# Patient Record
Sex: MALE | Race: WHITE | NOT HISPANIC OR LATINO | Employment: FULL TIME | URBAN - METROPOLITAN AREA
[De-identification: names, ages, dates, MRNs, and addresses within clinical notes are randomized per-mention and may not be internally consistent; named-entity substitution may affect disease eponyms.]

---

## 2023-02-24 LAB
ACTIVATED PARTIAL THROMBOPLASTIN TIME IN PPP BY COAGULATION ASSAY: 28 SEC (ref 26–39)
ALBUMIN (G/DL) IN SER/PLAS: 4 G/DL (ref 3.4–5)
ANION GAP IN SER/PLAS: 10 MMOL/L (ref 10–20)
CALCIUM (MG/DL) IN SER/PLAS: 9.3 MG/DL (ref 8.6–10.3)
CARBON DIOXIDE, TOTAL (MMOL/L) IN SER/PLAS: 30 MMOL/L (ref 21–32)
CHLORIDE (MMOL/L) IN SER/PLAS: 98 MMOL/L (ref 98–107)
CHOLESTEROL (MG/DL) IN SER/PLAS: 121 MG/DL (ref 0–199)
CHOLESTEROL IN HDL (MG/DL) IN SER/PLAS: 55.7 MG/DL
CHOLESTEROL/HDL RATIO: 2.2
CREATININE (MG/DL) IN SER/PLAS: 0.79 MG/DL (ref 0.5–1.3)
ERYTHROCYTE DISTRIBUTION WIDTH (RATIO) BY AUTOMATED COUNT: 13.2 % (ref 11.5–14.5)
ERYTHROCYTE MEAN CORPUSCULAR HEMOGLOBIN CONCENTRATION (G/DL) BY AUTOMATED: 32.8 G/DL (ref 32–36)
ERYTHROCYTE MEAN CORPUSCULAR VOLUME (FL) BY AUTOMATED COUNT: 104 FL (ref 80–100)
ERYTHROCYTES (10*6/UL) IN BLOOD BY AUTOMATED COUNT: 3.64 X10E12/L (ref 4.5–5.9)
GFR MALE: >90 ML/MIN/1.73M2
GLUCOSE (MG/DL) IN SER/PLAS: 85 MG/DL (ref 74–99)
HEMATOCRIT (%) IN BLOOD BY AUTOMATED COUNT: 37.8 % (ref 41–52)
HEMOGLOBIN (G/DL) IN BLOOD: 12.4 G/DL (ref 13.5–17.5)
INR IN PPP BY COAGULATION ASSAY: 1 (ref 0.9–1.1)
LDL: 51 MG/DL (ref 0–99)
LEUKOCYTES (10*3/UL) IN BLOOD BY AUTOMATED COUNT: 4.9 X10E9/L (ref 4.4–11.3)
OSMOLALITY, SERUM: 294 MOSM/KG H2O (ref 280–300)
PHOSPHATE (MG/DL) IN SER/PLAS: 4 MG/DL (ref 2.5–4.9)
PLATELETS (10*3/UL) IN BLOOD AUTOMATED COUNT: 305 X10E9/L (ref 150–450)
POTASSIUM (MMOL/L) IN SER/PLAS: 4.5 MMOL/L (ref 3.5–5.3)
PROTHROMBIN TIME (PT) IN PPP BY COAGULATION ASSAY: 11.3 SEC (ref 9.8–13.4)
SODIUM (MMOL/L) IN SER/PLAS: 133 MMOL/L (ref 136–145)
TRIGLYCERIDE (MG/DL) IN SER/PLAS: 74 MG/DL (ref 0–149)
UREA NITROGEN (MG/DL) IN SER/PLAS: 38 MG/DL (ref 6–23)
VLDL: 15 MG/DL (ref 0–40)

## 2023-02-25 LAB
CHLORIDE (MOL/L) IN SPOT URINE: 52 MMOL/L
CHLORIDE/CREATININE (MMOL/G) IN URINE: 74 MMOL/G CREAT (ref 23–275)
CREATININE (MG/DL) IN URINE: 69.9 MG/DL (ref 20–370)
OSMOLALITY, RANDOM URINE: 792 MOSM/KG (ref 200–1200)
POTASSIUM URINE RANDOM: 45 MMOL/L
POTASSIUM/CREATININE (MMOL/G) IN URINE: 64 MMOL/G CREAT
SODIUM URINE RANDOM: 53 MMOL/L
SODIUM/CREATININE (MMOL/G) IN URINE: 76 MMOL/G CREAT
UREA NITROGEN, RANDOM URINE: 1426 MG/DL
UREA NITROGEN/CREATININE (G/G) URINE: 20.4 G/G CREAT

## 2023-05-17 LAB
ALBUMIN (G/DL) IN SER/PLAS: 3.8 G/DL (ref 3.4–5)
ANION GAP IN SER/PLAS: 10 MMOL/L (ref 10–20)
CALCIUM (MG/DL) IN SER/PLAS: 9.3 MG/DL (ref 8.6–10.3)
CARBON DIOXIDE, TOTAL (MMOL/L) IN SER/PLAS: 30 MMOL/L (ref 21–32)
CHLORIDE (MMOL/L) IN SER/PLAS: 93 MMOL/L (ref 98–107)
CREATININE (MG/DL) IN SER/PLAS: 0.77 MG/DL (ref 0.5–1.3)
GFR MALE: >90 ML/MIN/1.73M2
GLUCOSE (MG/DL) IN SER/PLAS: 84 MG/DL (ref 74–99)
OSMOLALITY, SERUM: 275 MOSM/KG H2O (ref 280–300)
PHOSPHATE (MG/DL) IN SER/PLAS: 3.5 MG/DL (ref 2.5–4.9)
POTASSIUM (MMOL/L) IN SER/PLAS: 4.2 MMOL/L (ref 3.5–5.3)
SODIUM (MMOL/L) IN SER/PLAS: 129 MMOL/L (ref 136–145)
UREA NITROGEN (MG/DL) IN SER/PLAS: 15 MG/DL (ref 6–23)

## 2023-05-18 LAB
CHLORIDE (MOL/L) IN SPOT URINE: 64 MMOL/L
CHLORIDE/CREATININE (MMOL/G) IN URINE: 56 MMOL/G CREAT (ref 23–275)
CREATININE (MG/DL) IN URINE: 114 MG/DL (ref 20–370)
OSMOLALITY, RANDOM URINE: 557 MOSM/KG (ref 200–1200)
POTASSIUM URINE RANDOM: 55 MMOL/L
POTASSIUM/CREATININE (MMOL/G) IN URINE: 48 MMOL/G CREAT
SODIUM URINE RANDOM: 68 MMOL/L
SODIUM/CREATININE (MMOL/G) IN URINE: 60 MMOL/G CREAT
UREA NITROGEN, RANDOM URINE: 782 MG/DL
UREA NITROGEN/CREATININE (G/G) URINE: 6.9 G/G CREAT

## 2023-08-25 PROBLEM — R06.83 SNORING: Status: ACTIVE | Noted: 2023-08-25

## 2023-08-25 PROBLEM — J30.9 ALLERGIC RHINITIS: Status: ACTIVE | Noted: 2023-08-25

## 2023-08-25 PROBLEM — M54.16 LUMBAR RADICULITIS: Status: ACTIVE | Noted: 2023-08-25

## 2023-08-25 PROBLEM — R60.9 EDEMA: Status: ACTIVE | Noted: 2023-08-25

## 2023-08-25 PROBLEM — E87.1 CHRONIC HYPONATREMIA: Status: ACTIVE | Noted: 2023-08-25

## 2023-08-25 PROBLEM — M17.11 PRIMARY OSTEOARTHRITIS OF RIGHT KNEE: Status: ACTIVE | Noted: 2023-08-25

## 2023-08-25 PROBLEM — Z79.2 PROPHYLACTIC ANTIBIOTIC: Status: ACTIVE | Noted: 2023-08-25

## 2023-08-25 PROBLEM — M25.561 RIGHT KNEE PAIN: Status: ACTIVE | Noted: 2023-08-25

## 2023-08-25 PROBLEM — M16.9 OA (OSTEOARTHRITIS) OF HIP: Status: ACTIVE | Noted: 2023-08-25

## 2023-08-25 PROBLEM — M19.011 ARTHRITIS OF RIGHT SHOULDER REGION: Status: ACTIVE | Noted: 2023-08-25

## 2023-08-25 PROBLEM — M16.12 PRIMARY OSTEOARTHRITIS OF LEFT HIP: Status: ACTIVE | Noted: 2023-08-25

## 2023-08-25 PROBLEM — G47.33 OBSTRUCTIVE SLEEP APNEA, ADULT: Status: ACTIVE | Noted: 2023-08-25

## 2023-08-25 PROBLEM — M25.511 BILATERAL SHOULDER PAIN: Status: ACTIVE | Noted: 2023-08-25

## 2023-08-25 PROBLEM — R20.0 NUMBNESS: Status: ACTIVE | Noted: 2023-08-25

## 2023-08-25 PROBLEM — N52.9 IMPOTENCE OF ORGANIC ORIGIN: Status: ACTIVE | Noted: 2023-08-25

## 2023-08-25 PROBLEM — H90.3 BILATERAL SENSORINEURAL HEARING LOSS: Status: ACTIVE | Noted: 2023-08-25

## 2023-08-25 PROBLEM — M25.512 BILATERAL SHOULDER PAIN: Status: ACTIVE | Noted: 2023-08-25

## 2023-08-25 PROBLEM — M51.26 LUMBAR DISC HERNIATION: Status: ACTIVE | Noted: 2023-08-25

## 2023-08-25 PROBLEM — M54.50 LUMBAR BACK PAIN: Status: ACTIVE | Noted: 2023-08-25

## 2023-08-25 PROBLEM — M51.26 HERNIATED INTERVERTEBRAL DISC OF LUMBAR SPINE: Status: ACTIVE | Noted: 2023-08-25

## 2023-08-25 PROBLEM — N40.0 BPH (BENIGN PROSTATIC HYPERPLASIA): Status: ACTIVE | Noted: 2023-08-25

## 2023-08-25 PROBLEM — M79.601 PAIN OF RIGHT UPPER EXTREMITY: Status: ACTIVE | Noted: 2023-08-25

## 2023-08-25 PROBLEM — I25.10 CAD (CORONARY ARTERY DISEASE): Status: ACTIVE | Noted: 2023-08-25

## 2023-08-25 PROBLEM — M75.41 IMPINGEMENT SYNDROME OF RIGHT SHOULDER: Status: ACTIVE | Noted: 2023-08-25

## 2023-08-25 PROBLEM — I10 HYPERTENSION: Status: ACTIVE | Noted: 2023-08-25

## 2023-08-25 PROBLEM — M25.552 ARTHRALGIA OF LEFT HIP: Status: ACTIVE | Noted: 2023-08-25

## 2023-08-25 PROBLEM — R07.89 CHEST PAIN, ATYPICAL: Status: ACTIVE | Noted: 2023-08-25

## 2023-08-25 PROBLEM — J33.8 OTHER POLYP OF SINUS: Status: ACTIVE | Noted: 2023-08-25

## 2023-08-25 PROBLEM — Z95.1 S/P CABG X 2: Status: ACTIVE | Noted: 2023-08-25

## 2023-08-25 PROBLEM — M51.16 DISPLACEMENT OF LUMBAR DISC WITH RADICULOPATHY: Status: ACTIVE | Noted: 2023-08-25

## 2023-08-25 PROBLEM — G56.01 CARPAL TUNNEL SYNDROME, RIGHT: Status: ACTIVE | Noted: 2023-08-25

## 2023-08-25 PROBLEM — I25.10 ARTERIOSCLEROSIS OF CORONARY ARTERY: Status: ACTIVE | Noted: 2023-08-25

## 2023-08-25 PROBLEM — R42 DIZZINESS AND GIDDINESS: Status: ACTIVE | Noted: 2023-08-25

## 2023-08-25 PROBLEM — R51.9 HEADACHE: Status: ACTIVE | Noted: 2023-08-25

## 2023-08-25 PROBLEM — R03.0 ELEVATED BLOOD PRESSURE READING WITHOUT DIAGNOSIS OF HYPERTENSION: Status: ACTIVE | Noted: 2023-08-25

## 2023-08-25 PROBLEM — E78.5 HYPERLIPIDEMIA: Status: ACTIVE | Noted: 2023-08-25

## 2023-08-25 RX ORDER — FLUTICASONE PROPIONATE 93 UG/1
1 SPRAY, METERED NASAL 2 TIMES DAILY PRN
COMMUNITY

## 2023-08-25 RX ORDER — OXYCODONE AND ACETAMINOPHEN 5; 325 MG/1; MG/1
1 TABLET ORAL 3 TIMES DAILY PRN
COMMUNITY
Start: 2022-09-26 | End: 2023-10-31 | Stop reason: ALTCHOICE

## 2023-08-25 RX ORDER — TAMSULOSIN HYDROCHLORIDE 0.4 MG/1
1 CAPSULE ORAL DAILY
COMMUNITY
End: 2023-10-31 | Stop reason: ALTCHOICE

## 2023-08-25 RX ORDER — AMLODIPINE BESYLATE 10 MG/1
1 TABLET ORAL EVERY MORNING
COMMUNITY
End: 2023-10-31 | Stop reason: ALTCHOICE

## 2023-08-25 RX ORDER — CARVEDILOL 12.5 MG/1
1 TABLET ORAL
COMMUNITY
Start: 2022-07-20 | End: 2023-10-31 | Stop reason: ALTCHOICE

## 2023-08-25 RX ORDER — METOPROLOL SUCCINATE 100 MG/1
100 TABLET, EXTENDED RELEASE ORAL EVERY MORNING
COMMUNITY
End: 2024-01-12 | Stop reason: SDUPTHER

## 2023-08-25 RX ORDER — METOPROLOL SUCCINATE 25 MG/1
1 TABLET, EXTENDED RELEASE ORAL DAILY
COMMUNITY
Start: 2013-11-01 | End: 2023-10-31 | Stop reason: ALTCHOICE

## 2023-08-25 RX ORDER — ASPIRIN 81 MG/1
81 TABLET ORAL DAILY
COMMUNITY

## 2023-08-25 RX ORDER — ATORVASTATIN CALCIUM 80 MG/1
1 TABLET, FILM COATED ORAL DAILY
COMMUNITY
Start: 2022-07-20 | End: 2023-11-20

## 2023-08-25 RX ORDER — NALOXONE HYDROCHLORIDE 4 MG/.1ML
4 SPRAY NASAL AS NEEDED
COMMUNITY
End: 2023-10-31 | Stop reason: ALTCHOICE

## 2023-08-25 RX ORDER — AMLODIPINE BESYLATE 5 MG/1
1 TABLET ORAL DAILY
COMMUNITY
End: 2023-10-31 | Stop reason: ALTCHOICE

## 2023-08-25 RX ORDER — TRIAMCINOLONE ACETONIDE 1 MG/G
CREAM TOPICAL 2 TIMES DAILY PRN
COMMUNITY
End: 2023-10-31 | Stop reason: ALTCHOICE

## 2023-08-25 RX ORDER — ACETAMINOPHEN 500 MG
2 TABLET ORAL EVERY 6 HOURS PRN
COMMUNITY
End: 2023-11-13 | Stop reason: HOSPADM

## 2023-08-25 RX ORDER — SILDENAFIL 100 MG/1
1 TABLET, FILM COATED ORAL DAILY
COMMUNITY
End: 2023-10-31 | Stop reason: ALTCHOICE

## 2023-08-25 RX ORDER — CLOPIDOGREL BISULFATE 75 MG/1
1 TABLET ORAL DAILY
COMMUNITY
End: 2023-10-31 | Stop reason: ALTCHOICE

## 2023-08-25 RX ORDER — LISINOPRIL 5 MG/1
1 TABLET ORAL DAILY
COMMUNITY
End: 2023-10-31 | Stop reason: ALTCHOICE

## 2023-08-25 RX ORDER — ASPIRIN 81 MG/1
1 TABLET ORAL DAILY
COMMUNITY
End: 2023-10-31 | Stop reason: ALTCHOICE

## 2023-08-25 RX ORDER — SODIUM CHLORIDE 1000 MG
1 TABLET, SOLUBLE MISCELLANEOUS 2 TIMES DAILY
COMMUNITY
End: 2024-01-22

## 2023-08-25 RX ORDER — MELOXICAM 15 MG/1
1 TABLET ORAL DAILY PRN
COMMUNITY
Start: 2022-08-01 | End: 2023-10-31 | Stop reason: ALTCHOICE

## 2023-09-07 LAB
ALBUMIN (G/DL) IN SER/PLAS: 4.1 G/DL (ref 3.4–5)
ANION GAP IN SER/PLAS: 12 MMOL/L (ref 10–20)
CALCIUM (MG/DL) IN SER/PLAS: 9.5 MG/DL (ref 8.6–10.3)
CARBON DIOXIDE, TOTAL (MMOL/L) IN SER/PLAS: 30 MMOL/L (ref 21–32)
CHLORIDE (MMOL/L) IN SER/PLAS: 95 MMOL/L (ref 98–107)
CHLORIDE (MOL/L) IN SPOT URINE: 71 MMOL/L
CHLORIDE/CREATININE (MMOL/G) IN URINE: 110 MMOL/G CREAT (ref 23–275)
CREATININE (MG/DL) IN SER/PLAS: 0.74 MG/DL (ref 0.5–1.3)
CREATININE (MG/DL) IN URINE: 64.3 MG/DL (ref 20–370)
GFR MALE: >90 ML/MIN/1.73M2
GLUCOSE (MG/DL) IN SER/PLAS: 78 MG/DL (ref 74–99)
OSMOLALITY, RANDOM URINE: 417 MOSM/KG (ref 200–1200)
OSMOLALITY, SERUM: 276 MOSM/KG H2O (ref 280–300)
PHOSPHATE (MG/DL) IN SER/PLAS: 3.2 MG/DL (ref 2.5–4.9)
POTASSIUM (MMOL/L) IN SER/PLAS: 4.4 MMOL/L (ref 3.5–5.3)
POTASSIUM URINE RANDOM: 55 MMOL/L
POTASSIUM/CREATININE (MMOL/G) IN URINE: 86 MMOL/G CREAT
PROTEIN TOTAL: 6.7 G/DL (ref 6.4–8.2)
SODIUM (MMOL/L) IN SER/PLAS: 133 MMOL/L (ref 136–145)
SODIUM URINE RANDOM: 57 MMOL/L
SODIUM/CREATININE (MMOL/G) IN URINE: 89 MMOL/G CREAT
URATE (MG/DL) IN SER/PLAS: 5 MG/DL (ref 4–7.5)
UREA NITROGEN (MG/DL) IN SER/PLAS: 13 MG/DL (ref 6–23)
UREA NITROGEN, RANDOM URINE: 526 MG/DL
UREA NITROGEN/CREATININE (G/G) URINE: 8.2 G/G CREAT

## 2023-10-12 ENCOUNTER — OFFICE VISIT (OUTPATIENT)
Dept: PRIMARY CARE | Facility: CLINIC | Age: 73
End: 2023-10-12
Payer: COMMERCIAL

## 2023-10-12 VITALS
BODY MASS INDEX: 30.43 KG/M2 | SYSTOLIC BLOOD PRESSURE: 156 MMHG | HEIGHT: 68 IN | DIASTOLIC BLOOD PRESSURE: 71 MMHG | HEART RATE: 65 BPM | OXYGEN SATURATION: 98 % | TEMPERATURE: 97.3 F | WEIGHT: 200.8 LBS

## 2023-10-12 DIAGNOSIS — E87.1 HYPONATREMIA: ICD-10-CM

## 2023-10-12 DIAGNOSIS — R53.82 CHRONIC FATIGUE: Primary | ICD-10-CM

## 2023-10-12 PROBLEM — G89.29 CHRONIC RIGHT SHOULDER PAIN: Status: ACTIVE | Noted: 2021-02-25

## 2023-10-12 PROBLEM — M54.50 ACUTE LEFT-SIDED LOW BACK PAIN WITHOUT SCIATICA: Status: ACTIVE | Noted: 2020-02-17

## 2023-10-12 PROBLEM — R33.9 RETENTION OF URINE, UNSPECIFIED: Status: ACTIVE | Noted: 2022-08-11

## 2023-10-12 PROBLEM — Z96.642 PRESENCE OF LEFT ARTIFICIAL HIP JOINT: Status: ACTIVE | Noted: 2022-08-11

## 2023-10-12 PROBLEM — Z87.891 PERSONAL HISTORY OF NICOTINE DEPENDENCE: Status: ACTIVE | Noted: 2022-08-11

## 2023-10-12 PROBLEM — M25.511 CHRONIC RIGHT SHOULDER PAIN: Status: ACTIVE | Noted: 2021-02-25

## 2023-10-12 PROBLEM — D64.9 ANEMIA: Status: ACTIVE | Noted: 2022-08-11

## 2023-10-12 PROBLEM — M19.011 ARTHRITIS OF RIGHT ACROMIOCLAVICULAR JOINT: Status: ACTIVE | Noted: 2021-02-25

## 2023-10-12 PROBLEM — I25.10 ATHSCL HEART DISEASE OF NATIVE CORONARY ARTERY W/O ANG PCTRS: Status: ACTIVE | Noted: 2022-08-11

## 2023-10-12 PROBLEM — S83.241A ACUTE MEDIAL MENISCUS TEAR OF RIGHT KNEE: Status: ACTIVE | Noted: 2019-07-11

## 2023-10-12 PROBLEM — Z96.651 PRESENCE OF RIGHT ARTIFICIAL KNEE JOINT: Status: ACTIVE | Noted: 2022-08-11

## 2023-10-12 PROCEDURE — 3077F SYST BP >= 140 MM HG: CPT | Performed by: FAMILY MEDICINE

## 2023-10-12 PROCEDURE — 1036F TOBACCO NON-USER: CPT | Performed by: FAMILY MEDICINE

## 2023-10-12 PROCEDURE — 99214 OFFICE O/P EST MOD 30 MIN: CPT | Performed by: FAMILY MEDICINE

## 2023-10-12 PROCEDURE — 3078F DIAST BP <80 MM HG: CPT | Performed by: FAMILY MEDICINE

## 2023-10-12 PROCEDURE — 1159F MED LIST DOCD IN RCRD: CPT | Performed by: FAMILY MEDICINE

## 2023-10-12 PROCEDURE — 1126F AMNT PAIN NOTED NONE PRSNT: CPT | Performed by: FAMILY MEDICINE

## 2023-10-12 RX ORDER — AZELASTINE 1 MG/ML
SPRAY, METERED NASAL
COMMUNITY
Start: 2019-10-16 | End: 2023-10-31 | Stop reason: ALTCHOICE

## 2023-10-12 RX ORDER — UREA 15 G
POWDER IN PACKET (EA) ORAL DAILY
COMMUNITY
Start: 2023-09-25 | End: 2024-02-06

## 2023-10-12 RX ORDER — ACETAMINOPHEN 500 MG
TABLET ORAL
COMMUNITY
Start: 2022-08-23 | End: 2023-10-31 | Stop reason: ALTCHOICE

## 2023-10-12 RX ORDER — NAPROXEN SODIUM 220 MG/1
TABLET, FILM COATED ORAL
COMMUNITY
Start: 2022-08-23 | End: 2023-10-31 | Stop reason: ALTCHOICE

## 2023-10-12 RX ORDER — ASPIRIN 81 MG/1
81 TABLET ORAL 2 TIMES DAILY
COMMUNITY
Start: 2019-08-07 | End: 2023-10-31 | Stop reason: ALTCHOICE

## 2023-10-12 ASSESSMENT — ENCOUNTER SYMPTOMS
SINUS PAIN: 0
APPETITE CHANGE: 0
SLEEP DISTURBANCE: 0
LIGHT-HEADEDNESS: 1
FEVER: 0
RHINORRHEA: 0
FATIGUE: 1
ACTIVITY CHANGE: 0
SINUS PRESSURE: 0
POLYDIPSIA: 0
WEAKNESS: 0
SHORTNESS OF BREATH: 0
PALPITATIONS: 0
DECREASED CONCENTRATION: 1
SORE THROAT: 0
UNEXPECTED WEIGHT CHANGE: 0
PHOTOPHOBIA: 0

## 2023-10-12 NOTE — PROGRESS NOTES
I reviewed and examined the patient. I was present for the key exam elements, and I fully participated in the patient's care. I discussed the management of the care with the resident. I have personally reviewed the pertinent labs and imaging, as well as recent notes, with the patient. I have reviewed the note above and agree with the resident's medical decision making as documented in the resident's note, in addition to the following comments / findings:     Agree with the rest of the plan outlined below by resident physician. No red flags.      The patient understands and agrees to the assessment and plan of care. Patient has also agreed to follow up and comply with the treatment and evaluation as recommended today. Patient was instructed to call the office at [phone] should questions arise regarding their treatment or care.     Omer Gar DO, FAOASM  Family Medicine   [facility]  [address] [place] [address], [address]  [place], [place] [address]

## 2023-10-12 NOTE — PROGRESS NOTES
"Subjective   Patient ID: Luis Manuel Avitia is a 72 y.o. male who presents for brain fog the past 4-6 weeks. Previously seen by Dr. Gar for fatigue workup 2.5 years ago. Na and Cl low on previous CMP, referred to nephrologist. Takes x3 sodium chloride pill daily, URE-NA x2 daily, and limits to 2-3 glasses water per day. Fatigue better controlled when began this regimen, now coming in complaining of same symptoms. Denies forgetfulness. Wakes up feeling refreshed, but tires within 3-4 hours of waking. Additional sleeping doesn't help. Typically obtaining 7-8 hours a night. Sleeps with CPAP. No recent URIs, denies sick contacts.     Medical History:  HTN  DLD  JOHNATHAN    Surgical History:  Shoulder surgery 6 months ago  Aortocoronary bypass graft    Family History:  Father- lung cancer     Social History:  Smoking- former, quit at age 29  Alcohol- x1 per day  Occupation-      Preventative Medicine:  Vaccinations- none this year    Review of Systems   Constitutional:  Positive for fatigue. Negative for activity change, appetite change, fever and unexpected weight change.   HENT:  Negative for congestion, rhinorrhea, sinus pressure, sinus pain and sore throat.    Eyes:  Negative for photophobia and visual disturbance.   Respiratory:  Negative for shortness of breath.    Cardiovascular:  Negative for chest pain, palpitations and leg swelling.   Endocrine: Negative for polydipsia and polyuria.   Neurological:  Positive for light-headedness. Negative for syncope and weakness.   Psychiatric/Behavioral:  Positive for decreased concentration. Negative for sleep disturbance.    All other systems reviewed and are negative.    Objective   Vitals:   /71   Pulse 65   Temp 36.3 °C (97.3 °F)   Ht 1.727 m (5' 8\")   Wt 91.1 kg (200 lb 12.8 oz)   SpO2 98%   BMI 30.53 kg/m²    /70 in room  Physical Exam  Constitutional:       General: He is not in acute distress.     Appearance: Normal appearance. He is not " "toxic-appearing.   HENT:      Mouth/Throat:      Mouth: Mucous membranes are moist.      Pharynx: Oropharynx is clear.   Neck:      Vascular: No carotid bruit.      Comments: No thyromegaly.   Cardiovascular:      Rate and Rhythm: Normal rate and regular rhythm.   Pulmonary:      Effort: Pulmonary effort is normal. No respiratory distress.      Breath sounds: Normal breath sounds.   Musculoskeletal:         General: No signs of injury.      Right lower leg: No edema.      Left lower leg: No edema.   Lymphadenopathy:      Cervical: No cervical adenopathy.   Neurological:      Mental Status: He is alert.   Psychiatric:         Mood and Affect: Mood normal.         Behavior: Behavior normal.       Lab Results   Component Value Date    WBC 9.0 03/07/2023    HGB 11.7 (L) 03/07/2023    HCT 34.1 (L) 03/07/2023     (H) 03/07/2023     03/07/2023      Lab Results   Component Value Date    GLUCOSE 78 09/07/2023    CALCIUM 9.5 09/07/2023     (L) 09/07/2023    K 4.4 09/07/2023    CO2 30 09/07/2023    CL 95 (L) 09/07/2023    BUN 13 09/07/2023    CREATININE 0.74 09/07/2023     Lab Results   Component Value Date    CHOL 121 02/24/2023    CHOL 121 08/12/2022    CHOL 231 (H) 07/14/2022     Lab Results   Component Value Date    HDL 55.7 02/24/2023    HDL 55.6 08/12/2022    HDL 70.5 07/14/2022     No results found for: \"LDLCALC\"  Lab Results   Component Value Date    TRIG 74 02/24/2023    TRIG 70 08/12/2022    TRIG 104 07/14/2022     No components found for: \"CHOLHDL\"     Assessment:  72 y.o. male with a history of anemia, hyponatremia and hypochloremia presenting for brain fog. Pt has followed up with nephrologist in past 2 months, but still experiencing fatigue.     Chronic fatigue   History of aortocoronary bypass graft    Plan:  Obtain labs- CBC, CMP, TSH, Iron and TIBC, PSA  US of jameel Crawford, Student [employer]  "

## 2023-10-13 ENCOUNTER — LAB (OUTPATIENT)
Dept: LAB | Facility: LAB | Age: 73
End: 2023-10-13
Payer: COMMERCIAL

## 2023-10-13 DIAGNOSIS — R53.82 CHRONIC FATIGUE: ICD-10-CM

## 2023-10-13 LAB
ALBUMIN SERPL BCP-MCNC: 4.1 G/DL (ref 3.4–5)
ALP SERPL-CCNC: 64 U/L (ref 33–136)
ALT SERPL W P-5'-P-CCNC: 22 U/L (ref 10–52)
ANION GAP SERPL CALC-SCNC: 14 MMOL/L (ref 10–20)
AST SERPL W P-5'-P-CCNC: 19 U/L (ref 9–39)
BILIRUB SERPL-MCNC: 0.7 MG/DL (ref 0–1.2)
BUN SERPL-MCNC: 34 MG/DL (ref 6–23)
CALCIUM SERPL-MCNC: 9 MG/DL (ref 8.6–10.3)
CHLORIDE SERPL-SCNC: 97 MMOL/L (ref 98–107)
CO2 SERPL-SCNC: 27 MMOL/L (ref 21–32)
CREAT SERPL-MCNC: 0.76 MG/DL (ref 0.5–1.3)
ERYTHROCYTE [DISTWIDTH] IN BLOOD BY AUTOMATED COUNT: 12.6 % (ref 11.5–14.5)
GFR SERPL CREATININE-BSD FRML MDRD: >90 ML/MIN/1.73M*2
GLUCOSE SERPL-MCNC: 156 MG/DL (ref 74–99)
HCT VFR BLD AUTO: 42.7 % (ref 41–52)
HGB BLD-MCNC: 14 G/DL (ref 13.5–17.5)
IRON SATN MFR SERPL: 25 % (ref 25–45)
IRON SERPL-MCNC: 81 UG/DL (ref 35–150)
MCH RBC QN AUTO: 34.2 PG (ref 26–34)
MCHC RBC AUTO-ENTMCNC: 32.8 G/DL (ref 32–36)
MCV RBC AUTO: 104 FL (ref 80–100)
NRBC BLD-RTO: 0 /100 WBCS (ref 0–0)
PLATELET # BLD AUTO: 263 X10*3/UL (ref 150–450)
PMV BLD AUTO: 9.1 FL (ref 7.5–11.5)
POTASSIUM SERPL-SCNC: 4.1 MMOL/L (ref 3.5–5.3)
PROT SERPL-MCNC: 6.8 G/DL (ref 6.4–8.2)
RBC # BLD AUTO: 4.09 X10*6/UL (ref 4.5–5.9)
SODIUM SERPL-SCNC: 134 MMOL/L (ref 136–145)
TIBC SERPL-MCNC: 324 UG/DL (ref 240–445)
TSH SERPL-ACNC: 0.53 MIU/L (ref 0.44–3.98)
UIBC SERPL-MCNC: 243 UG/DL (ref 110–370)
WBC # BLD AUTO: 6.5 X10*3/UL (ref 4.4–11.3)

## 2023-10-13 PROCEDURE — 83540 ASSAY OF IRON: CPT

## 2023-10-13 PROCEDURE — 84443 ASSAY THYROID STIM HORMONE: CPT

## 2023-10-13 PROCEDURE — 84153 ASSAY OF PSA TOTAL: CPT

## 2023-10-13 PROCEDURE — 83550 IRON BINDING TEST: CPT

## 2023-10-13 PROCEDURE — 80053 COMPREHEN METABOLIC PANEL: CPT

## 2023-10-13 PROCEDURE — 85027 COMPLETE CBC AUTOMATED: CPT

## 2023-10-13 PROCEDURE — 36415 COLL VENOUS BLD VENIPUNCTURE: CPT

## 2023-10-14 LAB — PSA SERPL-MCNC: 0.88 NG/ML

## 2023-10-16 ENCOUNTER — APPOINTMENT (OUTPATIENT)
Dept: RADIOLOGY | Facility: HOSPITAL | Age: 73
End: 2023-10-16
Payer: COMMERCIAL

## 2023-10-16 ENCOUNTER — HOSPITAL ENCOUNTER (OUTPATIENT)
Dept: VASCULAR MEDICINE | Facility: CLINIC | Age: 73
Discharge: HOME | End: 2023-10-16
Payer: COMMERCIAL

## 2023-10-16 DIAGNOSIS — R42 DIZZINESS AND GIDDINESS: ICD-10-CM

## 2023-10-16 DIAGNOSIS — R53.82 CHRONIC FATIGUE: ICD-10-CM

## 2023-10-16 PROCEDURE — 93880 EXTRACRANIAL BILAT STUDY: CPT | Performed by: INTERNAL MEDICINE

## 2023-10-16 PROCEDURE — 93880 EXTRACRANIAL BILAT STUDY: CPT

## 2023-10-18 ENCOUNTER — TELEPHONE (OUTPATIENT)
Dept: PRIMARY CARE | Facility: CLINIC | Age: 73
End: 2023-10-18
Payer: COMMERCIAL

## 2023-10-18 DIAGNOSIS — E78.5 HYPERLIPIDEMIA, UNSPECIFIED HYPERLIPIDEMIA TYPE: Primary | ICD-10-CM

## 2023-10-18 DIAGNOSIS — R73.9 HYPERGLYCEMIA: ICD-10-CM

## 2023-10-18 NOTE — TELEPHONE ENCOUNTER
Patient called asking for the results from the ultrasound that he had done on the 16th.  It looks like it is done but has not been signed off on yet though.

## 2023-10-24 NOTE — PROGRESS NOTES
CHIEF COMPLAINT:   Chief Complaint   Patient presents with    Right Shoulder - Post-op    Left Shoulder - Pain     History: 72 y.o. male presents to the office today for follow-up present time.  He is his left shoulder.  He has known glenohumeral arthritis on the left side.  We performed a right reverse shoulder replacement on him about 8 months ago and he is doing well from that perspective.  He is still working on strengthening.  In terms of left side, he has significant pain that is anterior and deep in the left shoulder.  He difficulty with overhead activities.  He has had cortisone injections which provided only minimal relief.  He has done therapy alongside his right shoulder, denies says that the pain is only gotten worse.  Is at the point where the left shoulder significantly impacting his ability and to enjoy life.    Past medical history, past surgical history, medications, allergies, family history, social history, and review of systems were reviewed today.    A 12 point review of systems was negative other than as stated in the HPI.    Past Medical History:   Diagnosis Date    Encounter for other preprocedural examination 12/08/2020    Preoperative clearance    Other intervertebral disc displacement, lumbar region 10/30/2020    Lumbar disc herniation    Personal history of other diseases of the circulatory system     History of hypertension    Personal history of other diseases of the nervous system and sense organs     History of obstructive sleep apnea        No Known Allergies     Past Surgical History:   Procedure Laterality Date    KNEE SURGERY  05/11/2016    Knee Surgery    OTHER SURGICAL HISTORY  09/23/2022    Coronary artery bypass graft    OTHER SURGICAL HISTORY  12/17/2021    Hip surgery        Family History   Problem Relation Name Age of Onset    Lung cancer Father          Social History     Socioeconomic History    Marital status:      Spouse name: Not on file    Number of children:  Not on file    Years of education: Not on file    Highest education level: Not on file   Occupational History    Not on file   Tobacco Use    Smoking status: Former     Types: Cigarettes     Quit date: 1980     Years since quittin.8    Smokeless tobacco: Never   Vaping Use    Vaping Use: Never used   Substance and Sexual Activity    Alcohol use: Yes     Alcohol/week: 6.0 standard drinks of alcohol     Types: 2 Glasses of wine, 2 Cans of beer, 2 Shots of liquor per week    Drug use: Never    Sexual activity: Not on file   Other Topics Concern    Not on file   Social History Narrative    Not on file     Social Determinants of Health     Financial Resource Strain: Not on file   Food Insecurity: Not on file   Transportation Needs: Not on file   Physical Activity: Not on file   Stress: Not on file   Social Connections: Not on file   Intimate Partner Violence: Not on file   Housing Stability: Not on file        CURRENT MEDICATIONS:   Current Outpatient Medications   Medication Sig Dispense Refill    acetaminophen (Tylenol) 500 mg tablet Take 2 tablets (1,000 mg) by mouth every 6 hours if needed.      acetaminophen (Tylenol) 500 mg tablet 2 tablet EVERY 6 HOURS (route: oral)      amLODIPine (Norvasc) 10 mg tablet Take 1 tablet (10 mg) by mouth once daily in the morning.      amLODIPine (Norvasc) 5 mg tablet Take 1 tablet (5 mg) by mouth once daily.      aspirin (Adult Low Dose Aspirin) 81 mg EC tablet Take 1 tablet (81 mg) by mouth once daily. As directed      aspirin (Aspirin Childrens) 81 mg chewable tablet 1 tablet DAILY (route: oral)      aspirin 81 mg EC tablet Take 1 tablet (81 mg) by mouth once daily.      aspirin 81 mg EC tablet Take 1 tablet (81 mg) by mouth twice a day.      atorvastatin (Lipitor) 80 mg tablet Take 1 tablet (80 mg) by mouth once daily.      azelastine (Astelin) 137 mcg (0.1 %) nasal spray Instill 2 (TWO) sprays in each nostril TWICE DAILY      carvedilol (Coreg) 12.5 mg tablet Take 1  tablet (12.5 mg) by mouth 2 times a day with meals.      clopidogrel (Plavix) 75 mg tablet Take 1 tablet (75 mg) by mouth once daily.      diclofenac sodium 1 % kit APPLY TO UPPER EXTREMITIES 2 GM OF GEL TO AFFECTED AREA 4 TIMES DAILY. DO NOT APPY MORE THAN 8 GM DAILY TO ANY ONE AFFECTED JOINT.      ferrous sulfate 143 mg (45 mg iron) ER tablet Take 1 tablet (143 mg) by mouth once daily.      fluticasone propionate (Xhance) 93 mcg/actuation aerosol breath activated Administer 1 puff into affected nostril(s) 2 times a day as needed.      fluticasone propionate 93 mcg/actuation aerosol breath activated Administer 1 spray into affected nostril(s) 2 times a day as needed.      lisinopril 5 mg tablet Take 1 tablet (5 mg) by mouth once daily.      meloxicam (Mobic) 15 mg tablet Take 1 tablet (15 mg) by mouth once daily as needed.      metoprolol succinate XL (Toprol XL) 25 mg 24 hr tablet Take 1 tablet (25 mg) by mouth once daily. For 30 days      metoprolol succinate XL (Toprol-XL) 100 mg 24 hr tablet Take 1 tablet (100 mg) by mouth once daily in the morning.      multivitamin (MULTIPLE VITAMINS ORAL) Take 1 tablet by mouth once daily.      naloxone (Narcan) 4 mg/0.1 mL nasal spray Administer 1 spray (4 mg) into affected nostril(s) if needed for opioid reversal. May repeat every 2-3 minutes if needed, alternating nostrils, until medical assistance becomes available.      oxyCODONE-acetaminophen (Percocet) 5-325 mg tablet Take 1 tablet by mouth 3 times a day as needed.      sildenafil (Viagra) 100 mg tablet Take 1 tablet (100 mg) by mouth once daily. For 30 days      sodium chloride 1,000 mg tablet Take 1 tablet (1 g) by mouth 2 times a day.      tamsulosin (Flomax) 0.4 mg 24 hr capsule Take 1 capsule (0.4 mg) by mouth once daily.      triamcinolone (Kenalog) 0.1 % cream Apply topically 2 times a day as needed.      Ure-Na 15 gram powder in packet oral powder take 1 packet 3 times daily as directed       No current  "facility-administered medications for this visit.       Physical Examination:      4/19/2023     1:03 PM 6/7/2023     4:20 PM 6/7/2023     4:32 PM 6/7/2023     5:28 PM 7/19/2023    11:17 AM 10/12/2023     2:29 PM 10/25/2023     8:43 AM   Vitals   Systolic    122 129 156    Diastolic    72 79 71    Heart Rate   68 68 62 65    Temp      36.3 °C (97.3 °F)    Resp     16     Height (in) 1.727 m (5' 8\") 1.727 m (5' 8\")  1.727 m (5' 8\")  1.727 m (5' 8\") 1.727 m (5' 8\")   Weight (lb) 204 200.56  200.56  200.8 200   BMI 31.02 kg/m2 30.5 kg/m2  30.5 kg/m2  30.53 kg/m2 30.41 kg/m2   BSA (m2) 2.11 m2 2.09 m2  2.09 m2  2.09 m2 2.09 m2   Visit Report      Report Report      Body mass index is 30.41 kg/m².    Well-appearing, appears stated age, pleasant and cooperative, appropriate mood and behavior. Height and weight reviewed. Alert and oriented x3.  Auditory function intact.  No acute distress.  Intact ocular function, JACQUELINE, EOMI. Breathing is unlabored . Full range of motion of the neck in flexion/extension and rotational movements. No significant areas of tenderness to palpation in the neck. There is no evidence of jugular venous distension. Skin appearance is normal without evidence of rash or other lesions. 2+ radial pulses bilaterally, fingers pink and wwp, good capillary refill, no pitting edema. No appreciable lymphadenopathy in bilateral upper extremities. SILT throughout both upper extremities, median/radial/ulnar/musculocutaneous/axillary nerve motor and sensory intact (except for abnormalities noted in focused musculoskeletal exam section below).     On exam of the bilateral upper extremities, on the right side, there is well-healed incision.  Active forward flexion of 160, external rotation to 30, internal rotation to the back pocket.  On the left he has active forward flexion of 100, external rotation of 40, internal rotation to L3.  Passive range of motion of the left side is symmetric with active range of " motion.  Rotator cuff strength testing is intact on the left.    Imaging: Prior imaging of the left shoulder was reviewed.  Severe, end-stage arthritis of the left shoulder with complete loss of glenohumeral joint space.  There is glenoid bone loss as well.    Assessment: End-stage arthritis of the left shoulder    Plan: Patient with end-stage arthritis of the left shoulder and has been refractory to nonoperative management including injections, activity modifications, therapy, over-the-counter medications.  He has lost range of motion and pain in the left shoulder.  At this point he does not want to live with the left shoulder as it is.  We discussed the role of a shoulder replacement.  I do think that he would have a good outcome with an anatomic replacement on the left side.  Patient would like to proceed.  Think is very reasonable given that he has failed all nonoperative measures.    The patient has failed conservative management and has end stage, bone on bone glenohumeral arthritis with bony erosion, Kellgren-Yassine Grade 4.  At this point, the patient is a candidate for surgery.  Patient is in agreement with this.  Risks and recovery after surgery were discussed.  The proposed procedure will be an anatomic total shoulder arthroplasty.  Risks of surgery include bleeding, infection, neurovascular injury, persistent pain, implant failure, wound complications and possible need for further surgery. All surgeries that are performed under anesthesia also have the risks of DVTs, pulmonary embolism, potentially death. Per anesthesia's evaluation, the patient will have a nerve block, the risks of which the anesthesia team will discuss with the patient.   Patient voiced understanding of these risks and wished to proceed. We did discuss that if at the time of surgery their rotator cuff was found to be torn, than a reverse shoulder prosthesis would be used instead. Postoperative recovery involves being in a sling in  the early phases, while working on nonweightbearing range of motion, with progressive increases in range of motion and strengthening exercises over a period of a few months.  We did discuss that total shoulder replacement has an extensive recovery, usually around 3-4 months until they are getting their strength back and probably feeling around 75% at that time. It is really 1 year until their arm is feeling completely better.  We did discuss that in most patients, they are able to get very good pain relief, as well as range of motion and strength, though the range of motion may be little bit lacking to what they had when the shoulder was completely normal at a younger age.  We discussed that over the course of 10-20 years, there may be complications that arise after anatomic total shoulder replacement, such as glenoid component loosening or rotator cuff tearing, which may require revision surgery.     The patient will need PATs which will also include a CBC, BMP, PT/INR and a full work up by the PAT team as well as anesthesia evaluation.  My office will work to get this scheduled. I will see them back 2 weeks after surgery.      Dragon software was used to dictate this note, please be aware that minor errors in transcription may be present.    Dario Cadena MD    Shoulder/Elbow Surgery  [facility] [place]

## 2023-10-25 ENCOUNTER — OFFICE VISIT (OUTPATIENT)
Dept: ORTHOPEDIC SURGERY | Facility: CLINIC | Age: 73
End: 2023-10-25
Payer: COMMERCIAL

## 2023-10-25 ENCOUNTER — PREP FOR PROCEDURE (OUTPATIENT)
Dept: ORTHOPEDIC SURGERY | Facility: CLINIC | Age: 73
End: 2023-10-25

## 2023-10-25 ENCOUNTER — LAB (OUTPATIENT)
Dept: LAB | Facility: LAB | Age: 73
End: 2023-10-25
Payer: COMMERCIAL

## 2023-10-25 VITALS — HEIGHT: 68 IN | BODY MASS INDEX: 30.31 KG/M2 | WEIGHT: 200 LBS

## 2023-10-25 DIAGNOSIS — Z96.611 AFTERCARE FOLLOWING RIGHT SHOULDER JOINT REPLACEMENT SURGERY: ICD-10-CM

## 2023-10-25 DIAGNOSIS — E78.5 HYPERLIPIDEMIA, UNSPECIFIED HYPERLIPIDEMIA TYPE: ICD-10-CM

## 2023-10-25 DIAGNOSIS — Z47.1 AFTERCARE FOLLOWING RIGHT SHOULDER JOINT REPLACEMENT SURGERY: ICD-10-CM

## 2023-10-25 DIAGNOSIS — M19.012 ARTHRITIS OF LEFT SHOULDER REGION: Primary | ICD-10-CM

## 2023-10-25 DIAGNOSIS — R73.9 HYPERGLYCEMIA: ICD-10-CM

## 2023-10-25 DIAGNOSIS — M19.019 ARTHRITIS OF SHOULDER: Primary | ICD-10-CM

## 2023-10-25 LAB
CHOLEST SERPL-MCNC: 135 MG/DL (ref 0–199)
CHOLESTEROL/HDL RATIO: 2.1
HDLC SERPL-MCNC: 64.2 MG/DL
LDLC SERPL CALC-MCNC: 52 MG/DL
NON HDL CHOLESTEROL: 71 MG/DL (ref 0–149)
TRIGL SERPL-MCNC: 94 MG/DL (ref 0–149)
VLDL: 19 MG/DL (ref 0–40)

## 2023-10-25 PROCEDURE — 80061 LIPID PANEL: CPT

## 2023-10-25 PROCEDURE — 99213 OFFICE O/P EST LOW 20 MIN: CPT | Performed by: STUDENT IN AN ORGANIZED HEALTH CARE EDUCATION/TRAINING PROGRAM

## 2023-10-25 PROCEDURE — 36415 COLL VENOUS BLD VENIPUNCTURE: CPT

## 2023-10-25 PROCEDURE — 1159F MED LIST DOCD IN RCRD: CPT | Performed by: STUDENT IN AN ORGANIZED HEALTH CARE EDUCATION/TRAINING PROGRAM

## 2023-10-25 PROCEDURE — 1036F TOBACCO NON-USER: CPT | Performed by: STUDENT IN AN ORGANIZED HEALTH CARE EDUCATION/TRAINING PROGRAM

## 2023-10-25 PROCEDURE — 1126F AMNT PAIN NOTED NONE PRSNT: CPT | Performed by: STUDENT IN AN ORGANIZED HEALTH CARE EDUCATION/TRAINING PROGRAM

## 2023-10-25 PROCEDURE — 83036 HEMOGLOBIN GLYCOSYLATED A1C: CPT

## 2023-10-25 ASSESSMENT — PAIN SCALES - GENERAL: PAINLEVEL_OUTOF10: 5 - MODERATE PAIN

## 2023-10-25 ASSESSMENT — PAIN - FUNCTIONAL ASSESSMENT: PAIN_FUNCTIONAL_ASSESSMENT: 0-10

## 2023-10-25 ASSESSMENT — PAIN DESCRIPTION - DESCRIPTORS: DESCRIPTORS: DULL;ACHING

## 2023-10-25 NOTE — H&P
Booking     Patient called, would like a script for a nebulizer called in  He said he will not use [facility] for this because they will not take insurance for a nebulizer       [employer]  [phone]

## 2023-10-26 LAB
EST. AVERAGE GLUCOSE BLD GHB EST-MCNC: 108 MG/DL
HBA1C MFR BLD: 5.4 %

## 2023-10-31 ENCOUNTER — APPOINTMENT (OUTPATIENT)
Dept: PREADMISSION TESTING | Facility: HOSPITAL | Age: 73
End: 2023-10-31
Payer: COMMERCIAL

## 2023-10-31 ENCOUNTER — TELEPHONE (OUTPATIENT)
Dept: PRIMARY CARE | Facility: CLINIC | Age: 73
End: 2023-10-31
Payer: COMMERCIAL

## 2023-11-06 ENCOUNTER — PRE-ADMISSION TESTING (OUTPATIENT)
Dept: PREADMISSION TESTING | Facility: HOSPITAL | Age: 73
End: 2023-11-06
Payer: COMMERCIAL

## 2023-11-06 VITALS
RESPIRATION RATE: 18 BRPM | WEIGHT: 207.67 LBS | HEIGHT: 67 IN | TEMPERATURE: 96.1 F | SYSTOLIC BLOOD PRESSURE: 139 MMHG | OXYGEN SATURATION: 99 % | BODY MASS INDEX: 32.6 KG/M2 | HEART RATE: 65 BPM | DIASTOLIC BLOOD PRESSURE: 58 MMHG

## 2023-11-06 DIAGNOSIS — I10 PRIMARY HYPERTENSION: Primary | ICD-10-CM

## 2023-11-06 PROCEDURE — 93005 ELECTROCARDIOGRAM TRACING: CPT | Performed by: NURSE PRACTITIONER

## 2023-11-06 PROCEDURE — 99204 OFFICE O/P NEW MOD 45 MIN: CPT | Performed by: NURSE PRACTITIONER

## 2023-11-06 PROCEDURE — 87081 CULTURE SCREEN ONLY: CPT | Mod: AHULAB | Performed by: NURSE PRACTITIONER

## 2023-11-06 ASSESSMENT — ENCOUNTER SYMPTOMS
ARTHRALGIAS: 1
LIMITED RANGE OF MOTION: 1
RESPIRATORY NEGATIVE: 1
ENDOCRINE NEGATIVE: 1
CONSTITUTIONAL NEGATIVE: 1
CARDIOVASCULAR NEGATIVE: 1
GASTROINTESTINAL NEGATIVE: 1
NECK NEGATIVE: 1
NEUROLOGICAL NEGATIVE: 1

## 2023-11-06 NOTE — PREPROCEDURE INSTRUCTIONS
Medication List            Accurate as of November 6, 2023  1:17 PM. Always use your most recent med list.                acetaminophen 500 mg tablet  Commonly known as: Tylenol  Notes to patient: May take morning of surgery     Adult Low Dose Aspirin 81 mg EC tablet  Generic drug: aspirin  Notes to patient: May take morning of surgery     atorvastatin 80 mg tablet  Commonly known as: Lipitor  Medication Adjustments for Surgery: Stop 1 day before surgery     metoprolol succinate  mg 24 hr tablet  Commonly known as: Toprol-XL  Notes to patient: May take morning of surgery     MULTIPLE VITAMINS ORAL  Medication Adjustments for Surgery: Stop 1 day before surgery     sodium chloride 1,000 mg tablet  Medication Adjustments for Surgery: Stop 1 day before surgery     Ure-Na 15 gram powder in packet oral powder  Generic drug: urea  Medication Adjustments for Surgery: Stop 1 day before surgery     Xhance 93 mcg/actuation aerosol breath activated  Generic drug: fluticasone propionate  Notes to patient: Use morning of surgery            CONTACT SURGEON'S OFFICE IF YOU DEVELOP:  * Fever = 100.4 F   * New respiratory symptoms (e.g. cough, shortness of breath, respiratory distress, sore throat)  * Recent loss of taste or smell  *Flu like symptoms such as headache, fatigue or gastrointestinal symptoms  * You develop any open sores, shingles, burning or painful urination   AND/OR:  * You no longer wish to have the surgery.  * Any other personal circumstances change that may lead to the need to cancel or defer this surgery.  *You were admitted to any hospital within one week of your planned procedure.    SMOKING:  *Quitting smoking can make a huge difference to your health and recovery from surgery.    *If you need help with quitting, call 3-453-QUIT-NOW.    THE DAY BEFORE SURGERY:  *Do not eat any food after midnight the night before surgery.   *You are permitted to drink clear liquids (i.e. water, black coffee, tea, clear  broth, apple juice) up to 2 hours before your surgery.  DIABETICS:  Please check fasting blood sugar  upon waking up.  If fasting sugar is <80 mg/dl, please drink 100ml/3oz of apple juice no later than 2 hours prior to surgery.      SURGICAL TIME  *You will be contacted between 2 p.m. and 6 p.m. the business day before your surgery with your arrival time.  *If you haven't received a call by 6pm, call [phone].  *Scheduled surgery times may change and you will be notified if this occurs-check your personal voicemail for any updates.    ON THE MORNING OF SURGERY:  *Wear comfortable, loose fitting clothing.   *Do not use moisturizers, creams, lotions or perfume.  *All jewelry and valuables should be left at home.  *Prosthetic devices such as contact lenses, hearing aids, dentures, eyelash extensions, hairpins and body piercing must be removed before surgery.    BRING WITH YOU:  *Photo ID and insurance card  *Current list of medicines and allergies  *Pacemaker/Defibrillator/Heart stent cards  *CPAP machine and mask  *Slings/splints/crutches  *Copy of your complete Advanced Directive/DHPOA-if applicable  *Neurostimulator implant remote    PARKING AND ARRIVAL:  *Check in at the Main Entrance desk and let them know you are here for surgery.  *You will be directed to the 2nd floor surgical waiting area.    AFTER OUTPATIENT SURGERY:  *A responsible adult MUST accompany you at the time of discharge and stay with you for 24 hours after your surgery.  *You may NOT drive yourself home after surgery.  *You may use a taxi or ride sharing service ([employer], [employer]) to return home ONLY if you are accompanied by a friend or family member.  *Instructions for resuming your medications will be provided by your surgeon.

## 2023-11-06 NOTE — CPM/PAT H&P
[facility]/PAT Evaluation       Name: Luis Manuel Avitia (Luis Manuel Avitia)  /Age: 1950/72 y.o.     In-Person       Date of Consult: 23    Referring Provider:  Dr. Cadena    Surgery, Date, and Length:  23, left total shoulder arthroplasty, 150 minutes    Patient presents to [facility] for perioperative risk assessment prior to scheduled surgery. He has severe glenohumeral arthritis on the left side. He is s/p right reverse shoulder replacement. He has failed conservative non-operative on the left side. He would like to proceed with left total shoulder arthroplasty.    This note was created in part upon personal review of patient's medical records    Pt denies any past history of anesthetic complications such as PONV, awareness, prolonged sedation, dental damage, aspiration, cardiac arrest, difficult intubation, difficult I.V. access or unexpected hospital admissions.  No history of malignant hyperthermia and or pseudocholinesterase deficiency.  No history of blood transfusions.    The patient is not a Confucianist and will accept blood and blood products if medically indicated.   Type and screen sent.       Past Medical History:   Diagnosis Date    CAD (coronary artery disease)     status post CABG x2  with LIMA to LAD and saphenous vein graft to obtuse marginal branch of the LCx    Chronic hyponatremia     followed by nephrology, managed on meds; 23 CU=628    Encounter for other preprocedural examination 2020    Preoperative clearance    HLD (hyperlipidemia)     HTN (hypertension)     Lumbar herniated disc     OA (osteoarthritis)     JOHNATHAN on CPAP     Other intervertebral disc displacement, lumbar region 10/30/2020    Lumbar disc herniation    Personal history of other diseases of the circulatory system     History of hypertension    Personal history of other diseases of the nervous system and sense organs     History of obstructive sleep apnea    SIADH (syndrome of inappropriate ADH production)  (CMS/[facility])        Past Surgical History:   Procedure Laterality Date    CARDIAC CATHETERIZATION  2022    KNEE SURGERY Right 05/11/2016    Knee Surgery    OTHER SURGICAL HISTORY  09/23/2022    Coronary artery bypass graft    OTHER SURGICAL HISTORY Left 12/17/2021    Hip surgery    SHOULDER SURGERY Right     TSR       Family History   Problem Relation Name Age of Onset    Other (high blood pressure) Mother      Lung cancer Father         No Known Allergies      Current Outpatient Medications:     acetaminophen (Tylenol) 500 mg tablet, Take 2 tablets (1,000 mg) by mouth every 6 hours if needed., Disp: , Rfl:     aspirin (Adult Low Dose Aspirin) 81 mg EC tablet, Take 1 tablet (81 mg) by mouth once daily. As directed, Disp: , Rfl:     atorvastatin (Lipitor) 80 mg tablet, Take 1 tablet (80 mg) by mouth once daily., Disp: , Rfl:     fluticasone propionate (Xhance) 93 mcg/actuation aerosol breath activated, Administer 1 puff into affected nostril(s) 2 times a day as needed., Disp: , Rfl:     metoprolol succinate XL (Toprol-XL) 100 mg 24 hr tablet, Take 1 tablet (100 mg) by mouth once daily in the morning., Disp: , Rfl:     multivitamin (MULTIPLE VITAMINS ORAL), Take 1 tablet by mouth once daily., Disp: , Rfl:     sodium chloride 1,000 mg tablet, Take 1 tablet (1 g) by mouth 2 times a day., Disp: , Rfl:     Ure-Na 15 gram powder in packet oral powder, once daily., Disp: , Rfl:       PAT ROS:   Constitutional:   neg    Neuro/Psych:   neg    Eyes:    Corrective lenses  Ears:   neg    Nose:   neg    Mouth:   neg    Throat:   neg    Neck:   neg    Cardio:   neg    Respiratory:   neg    Endocrine:   neg    GI:   neg    :   neg    Musculoskeletal:    arthralgias (left shoulder)   decreased ROM (left shoulder)  Hematologic:   neg    Skin:  neg        Physical Exam  Vitals reviewed. Physical exam within normal limits.          PAT AIRWAY:   Airway:     Mallampati::  II    Neck ROM::  Full   No broken teeth, no dentures and no  "missing teeth        Visit Vitals  /58   Pulse 65   Temp 35.6 °C (96.1 °F)   Resp 18   Ht 1.702 m (5' 7\")   Wt 94.2 kg (207 lb 10.8 oz)   SpO2 99%   BMI 32.53 kg/m²   Smoking Status Former   BSA 2.11 m²       Assessment and Plan:     Patient is a 72 year old  male scheduled for left total shoulder arthroplasty on 11/13/23.    Patient is at acceptable risk to proceed with planned surgical procedure. Further cardiac risk stratification deferred at this time.    Plan    Cardiovascular:    Patient denies any chest pain, tightness, heaviness, pressure, radiating pain, palpitations, irregular heartbeats, lightheadedness, cough, congestion, shortness of breath, CALLAWAY, PND, near syncope, weight loss or gain.    Fair functional capacity- limited by left shoulder pain and decreased ROM.    EKG in [facility] on  11/6/23:  Normal sinus rhythm HR 67 bpm  Right bundle branch block  Minimal voltage criteria for LVH, may be normal variant  Abnormal ECG    RCRI: 0 Risk of Mace: 3.9%    CAD- s/p CABG x2. Continue Aspirin. Per Dr. Bernal on 11/7/23: patient is at low cardiac risk for left shoulder replacement.     HTN- instructed to continue Metoprolol including DOS    HLD- continue statin    Followed by Dr. Bernal last seen 9/15/23 per note:    \"1. CAD, status post CABG x2 8/15/2022 with LIMA to LAD and saphenous vein graft to obtuse marginal branch of the LCx. This patient began to develop new onset exertional anginal type chest discomfort in late 7/2022. The patient was seen by cardiology and underwent cardiac catheterization on 8/11/2022 which demonstrated a critical 90% distal LMCA stenosis. The patient remained in the hospital and subsequently underwent a CABG x2 procedure on 8/15/2022. Clinically the patient is doing well and is scheduled to begin cardiac rehab in near future. He will be allowed to discontinue Plavix but will remain on long-term aspirin therapy. Additional adjustments in his antihypertensive therapy will " "be made as below.     2. Hypertension. Blood pressure currently in adequate range. Nevertheless we will adjust therapy by changing carvedilol 12.5 mg twice daily to Toprol- mg daily. We will reduce amlodipine from 10 mg daily to 5 mg daily but increase lisinopril from 5 to 10 mg daily. He is off lisinopril per nephrology. We will stop amlodipine due to pedal edema.     3. Hyperlipidemia. Continue high intensity atorvastatin 80 mg daily with optimal results.     4. Chronic hyponatremia. The patient has had hyponatremia in the past thought to be related to diuretic therapy. He follows with nephrology.     5. BPH. \"      Pulm:  Known and treated  JOHNATHAN- Patient was instructed to bring CPAP machine the morning of surgery. Recommend prioritizing  nonopioid analgesic techniques (regional and local anesthesia, nonsteroidal medications, etc) before the administration of opioids and close monitoring for hypoventilation after surgery due to JOHNATHAN. Increased vigilance is recommended with the use of narcotics due to an increased risk for opioid induced respiratory depression    Known JOHNATHAN- compliant with CPAP    Renal/endo:    Chronic hyponatremia- managed by nephrology. Last sodium 134 October 2023.    Heme:  Patient instructed to ambulate as soon as possible postoperatively to decrease thromboembolic risk.    Initiate mechanical DVT prophylaxis as soon as possible and initiate chemical prophylaxis when deemed safe from a bleeding standpoint post surgery.    Patient to remain on Aspirin 81mg daily perioperatively    Caprini=5      Risk assessment complete.  Patient is scheduled for a intermediate surgical risk procedure. He is considered medically optimized for the planned procedure.    Labs/testing obtained in [facility] on 11/6/23: EKG, MRSA. Reviewed labs from October 2023.    Lab Results   Component Value Date    HGBA1C 5.4 10/25/2023     Lab Results   Component Value Date    GLUCOSE 156 (H) 10/13/2023    CALCIUM 9.0 10/13/2023 "     (L) 10/13/2023    K 4.1 10/13/2023    CO2 27 10/13/2023    CL 97 (L) 10/13/2023    BUN 34 (H) 10/13/2023    CREATININE 0.76 10/13/2023     Lab Results   Component Value Date    WBC 6.5 10/13/2023    HGB 14.0 10/13/2023    HCT 42.7 10/13/2023     (H) 10/13/2023     10/13/2023     Follow up: MRSA pending    Preoperative medication instructions were provided and reviewed with the patient.  Any additional testing or evaluation was explained to the patient.  Nothing by mouth instructions were discussed and patient's questions were answered prior to conclusion to this encounter.  Patient verbalized understanding of preoperative instructions given in preadmission testing; discharge instructions available in EMR.    This note was dictated with speech recognition.  Minor errors may have been detected during use of speech recognition.

## 2023-11-06 NOTE — H&P (VIEW-ONLY)
[facility]/PAT Evaluation       Name: Luis Manuel Avitia (Luis Manuel Avitia)  /Age: 1950/72 y.o.     In-Person       Date of Consult: 23    Referring Provider:  Dr. Cadena    Surgery, Date, and Length:  23, left total shoulder arthroplasty, 150 minutes    Patient presents to [facility] for perioperative risk assessment prior to scheduled surgery. He has severe glenohumeral arthritis on the left side. He is s/p right reverse shoulder replacement. He has failed conservative non-operative on the left side. He would like to proceed with left total shoulder arthroplasty.    This note was created in part upon personal review of patient's medical records    Pt denies any past history of anesthetic complications such as PONV, awareness, prolonged sedation, dental damage, aspiration, cardiac arrest, difficult intubation, difficult I.V. access or unexpected hospital admissions.  No history of malignant hyperthermia and or pseudocholinesterase deficiency.  No history of blood transfusions.    The patient is not a Islam and will accept blood and blood products if medically indicated.   Type and screen sent.       Past Medical History:   Diagnosis Date    CAD (coronary artery disease)     status post CABG x2  with LIMA to LAD and saphenous vein graft to obtuse marginal branch of the LCx    Chronic hyponatremia     followed by nephrology, managed on meds; 23 WE=177    Encounter for other preprocedural examination 2020    Preoperative clearance    HLD (hyperlipidemia)     HTN (hypertension)     Lumbar herniated disc     OA (osteoarthritis)     JOHNATHAN on CPAP     Other intervertebral disc displacement, lumbar region 10/30/2020    Lumbar disc herniation    Personal history of other diseases of the circulatory system     History of hypertension    Personal history of other diseases of the nervous system and sense organs     History of obstructive sleep apnea    SIADH (syndrome of inappropriate ADH production)  (CMS/[facility])        Past Surgical History:   Procedure Laterality Date    CARDIAC CATHETERIZATION  2022    KNEE SURGERY Right 05/11/2016    Knee Surgery    OTHER SURGICAL HISTORY  09/23/2022    Coronary artery bypass graft    OTHER SURGICAL HISTORY Left 12/17/2021    Hip surgery    SHOULDER SURGERY Right     TSR       Family History   Problem Relation Name Age of Onset    Other (high blood pressure) Mother      Lung cancer Father         No Known Allergies      Current Outpatient Medications:     acetaminophen (Tylenol) 500 mg tablet, Take 2 tablets (1,000 mg) by mouth every 6 hours if needed., Disp: , Rfl:     aspirin (Adult Low Dose Aspirin) 81 mg EC tablet, Take 1 tablet (81 mg) by mouth once daily. As directed, Disp: , Rfl:     atorvastatin (Lipitor) 80 mg tablet, Take 1 tablet (80 mg) by mouth once daily., Disp: , Rfl:     fluticasone propionate (Xhance) 93 mcg/actuation aerosol breath activated, Administer 1 puff into affected nostril(s) 2 times a day as needed., Disp: , Rfl:     metoprolol succinate XL (Toprol-XL) 100 mg 24 hr tablet, Take 1 tablet (100 mg) by mouth once daily in the morning., Disp: , Rfl:     multivitamin (MULTIPLE VITAMINS ORAL), Take 1 tablet by mouth once daily., Disp: , Rfl:     sodium chloride 1,000 mg tablet, Take 1 tablet (1 g) by mouth 2 times a day., Disp: , Rfl:     Ure-Na 15 gram powder in packet oral powder, once daily., Disp: , Rfl:       PAT ROS:   Constitutional:   neg    Neuro/Psych:   neg    Eyes:    Corrective lenses  Ears:   neg    Nose:   neg    Mouth:   neg    Throat:   neg    Neck:   neg    Cardio:   neg    Respiratory:   neg    Endocrine:   neg    GI:   neg    :   neg    Musculoskeletal:    arthralgias (left shoulder)   decreased ROM (left shoulder)  Hematologic:   neg    Skin:  neg        Physical Exam  Vitals reviewed. Physical exam within normal limits.          PAT AIRWAY:   Airway:     Mallampati::  II    Neck ROM::  Full   No broken teeth, no dentures and no  "missing teeth        Visit Vitals  /58   Pulse 65   Temp 35.6 °C (96.1 °F)   Resp 18   Ht 1.702 m (5' 7\")   Wt 94.2 kg (207 lb 10.8 oz)   SpO2 99%   BMI 32.53 kg/m²   Smoking Status Former   BSA 2.11 m²       Assessment and Plan:     Patient is a 72 year old  male scheduled for left total shoulder arthroplasty on 11/13/23.    Patient is at acceptable risk to proceed with planned surgical procedure. Further cardiac risk stratification deferred at this time.    Plan    Cardiovascular:    Patient denies any chest pain, tightness, heaviness, pressure, radiating pain, palpitations, irregular heartbeats, lightheadedness, cough, congestion, shortness of breath, CALLAWAY, PND, near syncope, weight loss or gain.    Fair functional capacity- limited by left shoulder pain and decreased ROM.    EKG in [facility] on  11/6/23:  Normal sinus rhythm HR 67 bpm  Right bundle branch block  Minimal voltage criteria for LVH, may be normal variant  Abnormal ECG    RCRI: 0 Risk of Mace: 3.9%    CAD- s/p CABG x2. Continue Aspirin. Per Dr. Bernal on 11/7/23: patient is at low cardiac risk for left shoulder replacement.     HTN- instructed to continue Metoprolol including DOS    HLD- continue statin    Followed by Dr. Bernal last seen 9/15/23 per note:    \"1. CAD, status post CABG x2 8/15/2022 with LIMA to LAD and saphenous vein graft to obtuse marginal branch of the LCx. This patient began to develop new onset exertional anginal type chest discomfort in late 7/2022. The patient was seen by cardiology and underwent cardiac catheterization on 8/11/2022 which demonstrated a critical 90% distal LMCA stenosis. The patient remained in the hospital and subsequently underwent a CABG x2 procedure on 8/15/2022. Clinically the patient is doing well and is scheduled to begin cardiac rehab in near future. He will be allowed to discontinue Plavix but will remain on long-term aspirin therapy. Additional adjustments in his antihypertensive therapy will " "be made as below.     2. Hypertension. Blood pressure currently in adequate range. Nevertheless we will adjust therapy by changing carvedilol 12.5 mg twice daily to Toprol- mg daily. We will reduce amlodipine from 10 mg daily to 5 mg daily but increase lisinopril from 5 to 10 mg daily. He is off lisinopril per nephrology. We will stop amlodipine due to pedal edema.     3. Hyperlipidemia. Continue high intensity atorvastatin 80 mg daily with optimal results.     4. Chronic hyponatremia. The patient has had hyponatremia in the past thought to be related to diuretic therapy. He follows with nephrology.     5. BPH. \"      Pulm:  Known and treated  JOHNATHAN- Patient was instructed to bring CPAP machine the morning of surgery. Recommend prioritizing  nonopioid analgesic techniques (regional and local anesthesia, nonsteroidal medications, etc) before the administration of opioids and close monitoring for hypoventilation after surgery due to JOHNATHAN. Increased vigilance is recommended with the use of narcotics due to an increased risk for opioid induced respiratory depression    Known JOHNATHAN- compliant with CPAP    Renal/endo:    Chronic hyponatremia- managed by nephrology. Last sodium 134 October 2023.    Heme:  Patient instructed to ambulate as soon as possible postoperatively to decrease thromboembolic risk.    Initiate mechanical DVT prophylaxis as soon as possible and initiate chemical prophylaxis when deemed safe from a bleeding standpoint post surgery.    Patient to remain on Aspirin 81mg daily perioperatively    Caprini=5      Risk assessment complete.  Patient is scheduled for a intermediate surgical risk procedure. He is considered medically optimized for the planned procedure.    Labs/testing obtained in [facility] on 11/6/23: EKG, MRSA. Reviewed labs from October 2023.    Lab Results   Component Value Date    HGBA1C 5.4 10/25/2023     Lab Results   Component Value Date    GLUCOSE 156 (H) 10/13/2023    CALCIUM 9.0 10/13/2023 "     (L) 10/13/2023    K 4.1 10/13/2023    CO2 27 10/13/2023    CL 97 (L) 10/13/2023    BUN 34 (H) 10/13/2023    CREATININE 0.76 10/13/2023     Lab Results   Component Value Date    WBC 6.5 10/13/2023    HGB 14.0 10/13/2023    HCT 42.7 10/13/2023     (H) 10/13/2023     10/13/2023     Follow up: MRSA pending    Preoperative medication instructions were provided and reviewed with the patient.  Any additional testing or evaluation was explained to the patient.  Nothing by mouth instructions were discussed and patient's questions were answered prior to conclusion to this encounter.  Patient verbalized understanding of preoperative instructions given in preadmission testing; discharge instructions available in EMR.    This note was dictated with speech recognition.  Minor errors may have been detected during use of speech recognition.

## 2023-11-07 ENCOUNTER — TELEPHONE (OUTPATIENT)
Dept: CARDIOLOGY | Facility: CLINIC | Age: 73
End: 2023-11-07
Payer: COMMERCIAL

## 2023-11-07 LAB
ATRIAL RATE: 67 BPM
P AXIS: 0 DEGREES
P OFFSET: 182 MS
P ONSET: 124 MS
PR INTERVAL: 204 MS
Q ONSET: 226 MS
QRS COUNT: 10 BEATS
QRS DURATION: 132 MS
QT INTERVAL: 430 MS
QTC CALCULATION(BAZETT): 454 MS
QTC FREDERICIA: 446 MS
R AXIS: -28 DEGREES
T AXIS: 9 DEGREES
T OFFSET: 441 MS
VENTRICULAR RATE: 67 BPM

## 2023-11-08 ENCOUNTER — APPOINTMENT (OUTPATIENT)
Dept: RADIOLOGY | Facility: HOSPITAL | Age: 73
End: 2023-11-08
Payer: COMMERCIAL

## 2023-11-08 ENCOUNTER — HOSPITAL ENCOUNTER (OUTPATIENT)
Dept: RADIOLOGY | Facility: HOSPITAL | Age: 73
Discharge: HOME | End: 2023-11-08
Payer: COMMERCIAL

## 2023-11-08 DIAGNOSIS — M19.012 ARTHRITIS OF LEFT SHOULDER REGION: ICD-10-CM

## 2023-11-08 LAB — STAPHYLOCOCCUS SPEC CULT: NORMAL

## 2023-11-08 PROCEDURE — 73200 CT UPPER EXTREMITY W/O DYE: CPT | Mod: LT

## 2023-11-09 RX ORDER — TRANEXAMIC ACID 100 MG/ML
1000 INJECTION, SOLUTION INTRAVENOUS ONCE
Status: CANCELLED | OUTPATIENT
Start: 2023-11-09 | End: 2023-11-09

## 2023-11-09 RX ORDER — CEFAZOLIN SODIUM 2 G/100ML
2 INJECTION, SOLUTION INTRAVENOUS ONCE
Status: CANCELLED | OUTPATIENT
Start: 2023-11-09 | End: 2023-11-09

## 2023-11-10 NOTE — DISCHARGE INSTRUCTIONS
Shoulder and Elbow Service  Dario Cadena MD    Discharge Instructions after Total Shoulder Arthroplasty       You have a bandage over your shoulder. DO NOT REMOVE THIS BANDAGE.     Remain in your sling at all times. This includes sleeping in your sling. *You may come out of your sling for daily exercises only.     Do not actively move your shoulder during this time. Active reaching and lifting away from the body are NOT permitted.    You may use the operative arm for activities of daily living that do not require the operative arm to leave the side of the body. Such as: eating, drinking and bathing.    Remain non-weight bearing with the operative arm until you are seen post operatively.    Remove your arm from the sling to perform active elbow, wrist and hand range of motion exercises 4-5x per day. This will help alleviate joint stiffness and swelling in your arm and hand.    Use cryotherapy machine or ice on the shoulder intermittently over the first 72 hours up to the first 2 weeks following surgery.   Pain medication has been prescribed for you. Use your medicine liberally over the first 72 hours and only take if you are experiencing pain. You can then begin to taper your     use. You may take Extra Strength Tylenol or Tylenol only in place of the pain pills.   *DO NOT TAKE ANY nonsteroidal anti-inflammatory pain medications: Advil, Motrin, Ibuprofen, Aleve, Naproxen or Naprosyn. *UNLESS PRESCRIBED   You may shower after surgery. The dressing is waterproof.   Do not scrub the dressing, just let water run over it. Pat it dry.    Aspirin has been prescribed to prevent blood clots. Take one aspirin (81 mg) tablet once per day for 2 weeks after surgery, unless you have an aspirin sensitivity or allergy,      asthma or are on blood thinners. If Coumadin, Plavix, Xarelto or other blood thinning medication is prescribed for blood clot prevention, take this medication as directed by        your medical clearance  physician.   *DO NOT TAKE ASPRIN AND ANOTHER BLOOD THINNER!!    Please call the office at [phone] for any problems. Including the following:  - Excessive redness of the incision  - ANY drainage at or around incision  - Fever of more than 101.5 F    Please call [phone] to make a follow-up appointment to be seen at the [facility] or [facility]. You should see Dr Cadena 10-14 days after your surgical procedure.       [facility]   [place]  [address] [place] [address]  [phone]

## 2023-11-11 ENCOUNTER — ANESTHESIA EVENT (OUTPATIENT)
Dept: OPERATING ROOM | Facility: HOSPITAL | Age: 73
End: 2023-11-11
Payer: COMMERCIAL

## 2023-11-11 PROBLEM — E22.2 SIADH (SYNDROME OF INAPPROPRIATE ADH PRODUCTION) (MULTI): Status: ACTIVE | Noted: 2023-11-11

## 2023-11-11 NOTE — ANESTHESIA PREPROCEDURE EVALUATION
Patient: Luis Manuel Avitia    Procedure Information       Date/Time: 11/13/23 0730    Procedure: Left Shoulder Total Arthroplasty (Left: Arm Upper) - Pre-op block    Location: [facility] A OR 18 / [facility] A OR    Surgeons: Dario Cadena MD                                                    Pre-Anesthesia Evaluation      Luis Manuel Avitia is a 72 y.o. male who presents for procedure stated above.     Relevant Problems   Anesthesia (within normal limits)      Cardiovascular   (+) Arteriosclerosis of coronary artery   (+) Athscl heart disease of native coronary artery w/o ang pctrs   (+) CAD (coronary artery disease)   (+) Hyperlipidemia   (+) Hypertension      Endocrine   (+) Chronic hyponatremia      Neuro/Psych   (+) Carpal tunnel syndrome, right   (+) Displacement of lumbar disc with radiculopathy   (+) Lumbar radiculitis      Pulmonary   (+) Obstructive sleep apnea, adult      Hematology   (+) Anemia      Musculoskeletal   (+) Carpal tunnel syndrome, right   (+) Displacement of lumbar disc with radiculopathy   (+) Herniated intervertebral disc of lumbar spine   (+) Lumbar disc herniation   (+) OA (osteoarthritis) of hip   (+) Primary osteoarthritis of left hip   (+) Primary osteoarthritis of right knee      Eyes, Ears, Nose, and Throat   (+) Bilateral sensorineural hearing loss      Other   (+) Arthritis of right acromioclavicular joint   (+) Impingement syndrome of right shoulder       Surgical History reviewed  He has a past surgical history that includes Knee surgery (Right, 05/11/2016); Cardiac catheterization (2022); and Shoulder surgery (Right). CABG x2 2022    Social History reviewed  He reports that he quit smoking about 43 years ago. His smoking use included cigarettes. He smoked an average of .5 packs per day. He has been exposed to tobacco smoke. He has never used smokeless tobacco. He reports that he does not currently use alcohol after a past usage of about 5.0 standard drinks of alcohol per week. He reports  "that he does not use drugs.    Allergies and Medications reviewed  Patient has no known allergies.    Current Outpatient Medications   Medication Instructions    acetaminophen (Tylenol) 500 mg tablet 2 tablets, oral, Every 6 hours PRN    aspirin (ADULT LOW DOSE ASPIRIN) 81 mg, oral, Daily, As directed    atorvastatin (Lipitor) 80 mg tablet 1 tablet, oral, Daily    fluticasone propionate (Xhance) 93 mcg/actuation aerosol breath activated 1 puff, nasal, 2 times daily PRN    metoprolol succinate XL (TOPROL-XL) 100 mg, oral, Every morning    multivitamin (MULTIPLE VITAMINS ORAL) 1 tablet, oral, Daily    sodium chloride 1 g, oral, 2 times daily    Ure-Na 15 gram powder in packet oral powder once daily.           Relevant Results reviewed  Lab Results   Component Value Date    STAPHMRSASCR No Staphylococcus aureus isolated 11/06/2023     No results found for: \"ABORH\"  Lab Results   Component Value Date    WBC 6.5 10/13/2023    HGB 14.0 10/13/2023    HCT 42.7 10/13/2023     10/13/2023       Chemistry    Lab Results   Component Value Date/Time     (L) 10/13/2023 1444    K 4.1 10/13/2023 1444    CL 97 (L) 10/13/2023 1444    CO2 27 10/13/2023 1444    BUN 34 (H) 10/13/2023 1444    CREATININE 0.76 10/13/2023 1444    Lab Results   Component Value Date/Time    CALCIUM 9.0 10/13/2023 1444    ALKPHOS 64 10/13/2023 1444    AST 19 10/13/2023 1444    ALT 22 10/13/2023 1444    BILITOT 0.7 10/13/2023 1444          Lab Results   Component Value Date/Time    PROTIME 11.3 02/24/2023 0730    INR 1.0 02/24/2023 0730     Encounter Date: 11/06/23   ECG 12 Lead   Result Value    Ventricular Rate 67    Atrial Rate 67    NC Interval 204    QRS Duration 132    QT Interval 430    QTC Calculation(Bazett) 454    P Axis 0    R Axis -28    T Axis 9    QRS Count 10    Q Onset 226    P Onset 124    P Offset 182    T Offset 441    QTC Fredericia 446    Narrative    Normal sinus rhythm  Right bundle branch block  Minimal voltage criteria for " "LVH, may be normal variant  Abnormal ECG  When compared with ECG of 24-JAN-2023 16:14,  ND interval has decreased  Confirmed by Enrique Bah ([id number]) on 11/7/2023 9:07:45 AM       Vitals  /83   Pulse 60   Temp 36.5 °C (97.7 °F) (Temporal)   Resp 19   Ht 1.702 m (5' 7\")   Wt 94.2 kg (207 lb 10.8 oz)   SpO2 97%   BMI 32.53 kg/m²        NPO Detail:  NPO/Void Status  Carbonhydrate Drink Given Prior to Surgery? : N  Date of Last Liquid: 11/13/23  Time of Last Liquid: 0445 pineapple juice  Date of Last Solid: 11/12/23  Time of Last Solid: 1800  Last Intake Type: Clear fluids (water)  Time of Last Void: 0600         Physical Exam    Airway  Mallampati: II  TM distance: >3 FB  Neck ROM: full     Cardiovascular   Rhythm: regular  Rate: normal     Dental    Pulmonary - normal exam     Abdominal            Anesthesia Plan    ASA 3     general   (With nerve block for post op analgesia. )  intravenous induction   Anesthetic plan and risks discussed with patient.  Use of blood products discussed with patient who consented to blood products.    Plan discussed with CRNA and CAA.      "

## 2023-11-13 ENCOUNTER — APPOINTMENT (OUTPATIENT)
Dept: RADIOLOGY | Facility: HOSPITAL | Age: 73
End: 2023-11-13
Payer: COMMERCIAL

## 2023-11-13 ENCOUNTER — HOSPITAL ENCOUNTER (OUTPATIENT)
Facility: HOSPITAL | Age: 73
Setting detail: SURGERY ADMIT
Discharge: HOME | End: 2023-11-13
Attending: STUDENT IN AN ORGANIZED HEALTH CARE EDUCATION/TRAINING PROGRAM | Admitting: STUDENT IN AN ORGANIZED HEALTH CARE EDUCATION/TRAINING PROGRAM
Payer: COMMERCIAL

## 2023-11-13 ENCOUNTER — ANESTHESIA (OUTPATIENT)
Dept: OPERATING ROOM | Facility: HOSPITAL | Age: 73
End: 2023-11-13
Payer: COMMERCIAL

## 2023-11-13 VITALS
RESPIRATION RATE: 19 BRPM | BODY MASS INDEX: 32.6 KG/M2 | DIASTOLIC BLOOD PRESSURE: 85 MMHG | WEIGHT: 207.67 LBS | SYSTOLIC BLOOD PRESSURE: 152 MMHG | OXYGEN SATURATION: 97 % | HEART RATE: 69 BPM | HEIGHT: 67 IN | TEMPERATURE: 96.8 F

## 2023-11-13 DIAGNOSIS — G89.18 ACUTE POST-OPERATIVE PAIN: ICD-10-CM

## 2023-11-13 DIAGNOSIS — M75.22 BICEPS TENDINITIS OF LEFT SHOULDER: ICD-10-CM

## 2023-11-13 DIAGNOSIS — M19.012 ARTHRITIS OF LEFT SHOULDER REGION: Primary | ICD-10-CM

## 2023-11-13 PROCEDURE — 7100000002 HC RECOVERY ROOM TIME - EACH INCREMENTAL 1 MINUTE: Performed by: STUDENT IN AN ORGANIZED HEALTH CARE EDUCATION/TRAINING PROGRAM

## 2023-11-13 PROCEDURE — 23430 REPAIR BICEPS TENDON: CPT | Performed by: STUDENT IN AN ORGANIZED HEALTH CARE EDUCATION/TRAINING PROGRAM

## 2023-11-13 PROCEDURE — 3600000009 HC OR TIME - EACH INCREMENTAL 1 MINUTE - PROCEDURE LEVEL FOUR: Performed by: STUDENT IN AN ORGANIZED HEALTH CARE EDUCATION/TRAINING PROGRAM

## 2023-11-13 PROCEDURE — C1713 ANCHOR/SCREW BN/BN,TIS/BN: HCPCS | Performed by: STUDENT IN AN ORGANIZED HEALTH CARE EDUCATION/TRAINING PROGRAM

## 2023-11-13 PROCEDURE — 76942 ECHO GUIDE FOR BIOPSY: CPT | Performed by: ANESTHESIOLOGY

## 2023-11-13 PROCEDURE — 7100000001 HC RECOVERY ROOM TIME - INITIAL BASE CHARGE: Performed by: STUDENT IN AN ORGANIZED HEALTH CARE EDUCATION/TRAINING PROGRAM

## 2023-11-13 PROCEDURE — A9999 DME SUPPLY OR ACCESSORY, NOS: HCPCS | Performed by: STUDENT IN AN ORGANIZED HEALTH CARE EDUCATION/TRAINING PROGRAM

## 2023-11-13 PROCEDURE — 2500000004 HC RX 250 GENERAL PHARMACY W/ HCPCS (ALT 636 FOR OP/ED): Performed by: ANESTHESIOLOGIST ASSISTANT

## 2023-11-13 PROCEDURE — 97535 SELF CARE MNGMENT TRAINING: CPT | Mod: GO

## 2023-11-13 PROCEDURE — A23472 PR RECONSTR TOTAL SHOULDER IMPLANT: Performed by: ANESTHESIOLOGY

## 2023-11-13 PROCEDURE — 3600000004 HC OR TIME - INITIAL BASE CHARGE - PROCEDURE LEVEL FOUR: Performed by: STUDENT IN AN ORGANIZED HEALTH CARE EDUCATION/TRAINING PROGRAM

## 2023-11-13 PROCEDURE — 2780000003 HC OR 278 NO HCPCS: Performed by: STUDENT IN AN ORGANIZED HEALTH CARE EDUCATION/TRAINING PROGRAM

## 2023-11-13 PROCEDURE — 2500000004 HC RX 250 GENERAL PHARMACY W/ HCPCS (ALT 636 FOR OP/ED): Performed by: ANESTHESIOLOGY

## 2023-11-13 PROCEDURE — 2500000004 HC RX 250 GENERAL PHARMACY W/ HCPCS (ALT 636 FOR OP/ED): Performed by: STUDENT IN AN ORGANIZED HEALTH CARE EDUCATION/TRAINING PROGRAM

## 2023-11-13 PROCEDURE — 97165 OT EVAL LOW COMPLEX 30 MIN: CPT | Mod: GO

## 2023-11-13 PROCEDURE — 73020 X-RAY EXAM OF SHOULDER: CPT | Mod: LEFT SIDE | Performed by: RADIOLOGY

## 2023-11-13 PROCEDURE — 73020 X-RAY EXAM OF SHOULDER: CPT | Mod: LT

## 2023-11-13 PROCEDURE — 2720000007 HC OR 272 NO HCPCS: Performed by: STUDENT IN AN ORGANIZED HEALTH CARE EDUCATION/TRAINING PROGRAM

## 2023-11-13 PROCEDURE — 2500000005 HC RX 250 GENERAL PHARMACY W/O HCPCS: Performed by: ANESTHESIOLOGIST ASSISTANT

## 2023-11-13 PROCEDURE — 99100 ANES PT EXTEME AGE<1 YR&>70: CPT | Performed by: ANESTHESIOLOGY

## 2023-11-13 PROCEDURE — 23472 RECONSTRUCT SHOULDER JOINT: CPT | Performed by: STUDENT IN AN ORGANIZED HEALTH CARE EDUCATION/TRAINING PROGRAM

## 2023-11-13 PROCEDURE — 2500000001 HC RX 250 WO HCPCS SELF ADMINISTERED DRUGS (ALT 637 FOR MEDICARE OP): Performed by: ANESTHESIOLOGY

## 2023-11-13 PROCEDURE — A23472 PR RECONSTR TOTAL SHOULDER IMPLANT: Performed by: ANESTHESIOLOGIST ASSISTANT

## 2023-11-13 PROCEDURE — 3700000001 HC GENERAL ANESTHESIA TIME - INITIAL BASE CHARGE: Performed by: STUDENT IN AN ORGANIZED HEALTH CARE EDUCATION/TRAINING PROGRAM

## 2023-11-13 PROCEDURE — 3700000002 HC GENERAL ANESTHESIA TIME - EACH INCREMENTAL 1 MINUTE: Performed by: STUDENT IN AN ORGANIZED HEALTH CARE EDUCATION/TRAINING PROGRAM

## 2023-11-13 PROCEDURE — A4217 STERILE WATER/SALINE, 500 ML: HCPCS | Performed by: STUDENT IN AN ORGANIZED HEALTH CARE EDUCATION/TRAINING PROGRAM

## 2023-11-13 DEVICE — CEMENT, BONE, SIMPLEX P, RADIOPAQUE, FULL DOSE, 40 GM: Type: IMPLANTABLE DEVICE | Site: SHOULDER | Status: FUNCTIONAL

## 2023-11-13 DEVICE — IMPLANTABLE DEVICE: Type: IMPLANTABLE DEVICE | Site: SHOULDER | Status: FUNCTIONAL

## 2023-11-13 RX ORDER — PHENYLEPHRINE HCL IN 0.9% NACL 1 MG/10 ML
SYRINGE (ML) INTRAVENOUS AS NEEDED
Status: DISCONTINUED | OUTPATIENT
Start: 2023-11-13 | End: 2023-11-13

## 2023-11-13 RX ORDER — TRANEXAMIC ACID 100 MG/ML
INJECTION, SOLUTION INTRAVENOUS AS NEEDED
Status: DISCONTINUED | OUTPATIENT
Start: 2023-11-13 | End: 2023-11-13

## 2023-11-13 RX ORDER — FENTANYL CITRATE 50 UG/ML
INJECTION, SOLUTION INTRAMUSCULAR; INTRAVENOUS AS NEEDED
Status: DISCONTINUED | OUTPATIENT
Start: 2023-11-13 | End: 2023-11-13

## 2023-11-13 RX ORDER — SODIUM CHLORIDE 0.9 G/100ML
IRRIGANT IRRIGATION AS NEEDED
Status: DISCONTINUED | OUTPATIENT
Start: 2023-11-13 | End: 2023-11-13 | Stop reason: HOSPADM

## 2023-11-13 RX ORDER — SODIUM CHLORIDE, SODIUM LACTATE, POTASSIUM CHLORIDE, CALCIUM CHLORIDE 600; 310; 30; 20 MG/100ML; MG/100ML; MG/100ML; MG/100ML
100 INJECTION, SOLUTION INTRAVENOUS CONTINUOUS
Status: DISCONTINUED | OUTPATIENT
Start: 2023-11-13 | End: 2023-11-13 | Stop reason: HOSPADM

## 2023-11-13 RX ORDER — CEFAZOLIN SODIUM 2 G/100ML
2 INJECTION, SOLUTION INTRAVENOUS ONCE
Status: DISCONTINUED | OUTPATIENT
Start: 2023-11-13 | End: 2023-11-13

## 2023-11-13 RX ORDER — ONDANSETRON HYDROCHLORIDE 2 MG/ML
INJECTION, SOLUTION INTRAVENOUS AS NEEDED
Status: DISCONTINUED | OUTPATIENT
Start: 2023-11-13 | End: 2023-11-13

## 2023-11-13 RX ORDER — CEFAZOLIN SODIUM 2 G/100ML
2 INJECTION, SOLUTION INTRAVENOUS ONCE
Status: COMPLETED | OUTPATIENT
Start: 2023-11-13 | End: 2023-11-13

## 2023-11-13 RX ORDER — DEXAMETHASONE SODIUM PHOSPHATE 4 MG/ML
INJECTION, SOLUTION INTRA-ARTICULAR; INTRALESIONAL; INTRAMUSCULAR; INTRAVENOUS; SOFT TISSUE AS NEEDED
Status: DISCONTINUED | OUTPATIENT
Start: 2023-11-13 | End: 2023-11-13

## 2023-11-13 RX ORDER — SODIUM CHLORIDE, SODIUM LACTATE, POTASSIUM CHLORIDE, CALCIUM CHLORIDE 600; 310; 30; 20 MG/100ML; MG/100ML; MG/100ML; MG/100ML
100 INJECTION, SOLUTION INTRAVENOUS CONTINUOUS
Status: DISCONTINUED | OUTPATIENT
Start: 2023-11-13 | End: 2023-11-13

## 2023-11-13 RX ORDER — OXYCODONE HYDROCHLORIDE 5 MG/1
5 TABLET ORAL EVERY 4 HOURS PRN
Status: DISCONTINUED | OUTPATIENT
Start: 2023-11-13 | End: 2023-11-13 | Stop reason: HOSPADM

## 2023-11-13 RX ORDER — CEFAZOLIN SODIUM 2 G/50ML
SOLUTION INTRAVENOUS AS NEEDED
Status: DISCONTINUED | OUTPATIENT
Start: 2023-11-13 | End: 2023-11-13

## 2023-11-13 RX ORDER — PROPOFOL 10 MG/ML
INJECTION, EMULSION INTRAVENOUS AS NEEDED
Status: DISCONTINUED | OUTPATIENT
Start: 2023-11-13 | End: 2023-11-13

## 2023-11-13 RX ORDER — LIDOCAINE HYDROCHLORIDE 20 MG/ML
INJECTION, SOLUTION INFILTRATION; PERINEURAL AS NEEDED
Status: DISCONTINUED | OUTPATIENT
Start: 2023-11-13 | End: 2023-11-13

## 2023-11-13 RX ORDER — MIDAZOLAM HYDROCHLORIDE 1 MG/ML
INJECTION, SOLUTION INTRAMUSCULAR; INTRAVENOUS AS NEEDED
Status: DISCONTINUED | OUTPATIENT
Start: 2023-11-13 | End: 2023-11-13

## 2023-11-13 RX ORDER — VANCOMYCIN HYDROCHLORIDE 1 G/20ML
INJECTION, POWDER, LYOPHILIZED, FOR SOLUTION INTRAVENOUS AS NEEDED
Status: DISCONTINUED | OUTPATIENT
Start: 2023-11-13 | End: 2023-11-13 | Stop reason: HOSPADM

## 2023-11-13 RX ORDER — OXYCODONE HYDROCHLORIDE 5 MG/1
5 TABLET ORAL ONCE
Status: COMPLETED | OUTPATIENT
Start: 2023-11-13 | End: 2023-11-13

## 2023-11-13 RX ORDER — ROCURONIUM BROMIDE 10 MG/ML
INJECTION, SOLUTION INTRAVENOUS AS NEEDED
Status: DISCONTINUED | OUTPATIENT
Start: 2023-11-13 | End: 2023-11-13

## 2023-11-13 RX ORDER — ACETAMINOPHEN 500 MG
1000 TABLET ORAL 3 TIMES DAILY
Qty: 42 TABLET | Refills: 0 | Status: SHIPPED | OUTPATIENT
Start: 2023-11-13 | End: 2023-11-20

## 2023-11-13 RX ORDER — ONDANSETRON HYDROCHLORIDE 2 MG/ML
4 INJECTION, SOLUTION INTRAVENOUS ONCE AS NEEDED
Status: COMPLETED | OUTPATIENT
Start: 2023-11-13 | End: 2023-11-13

## 2023-11-13 RX ORDER — HYDRALAZINE HYDROCHLORIDE 20 MG/ML
5 INJECTION INTRAMUSCULAR; INTRAVENOUS EVERY 30 MIN PRN
Status: DISCONTINUED | OUTPATIENT
Start: 2023-11-13 | End: 2023-11-13 | Stop reason: HOSPADM

## 2023-11-13 RX ORDER — OXYCODONE HYDROCHLORIDE 5 MG/1
5 CAPSULE ORAL EVERY 6 HOURS PRN
Qty: 28 CAPSULE | Refills: 0 | Status: SHIPPED | OUTPATIENT
Start: 2023-11-13 | End: 2023-11-20

## 2023-11-13 RX ADMIN — SODIUM CHLORIDE, POTASSIUM CHLORIDE, SODIUM LACTATE AND CALCIUM CHLORIDE 100 ML/HR: 600; 310; 30; 20 INJECTION, SOLUTION INTRAVENOUS at 06:32

## 2023-11-13 RX ADMIN — SODIUM CHLORIDE, POTASSIUM CHLORIDE, SODIUM LACTATE AND CALCIUM CHLORIDE 100 ML/HR: 600; 310; 30; 20 INJECTION, SOLUTION INTRAVENOUS at 14:34

## 2023-11-13 RX ADMIN — HYDROMORPHONE HYDROCHLORIDE 0.5 MG: 1 INJECTION, SOLUTION INTRAMUSCULAR; INTRAVENOUS; SUBCUTANEOUS at 14:38

## 2023-11-13 RX ADMIN — SODIUM CHLORIDE, POTASSIUM CHLORIDE, SODIUM LACTATE AND CALCIUM CHLORIDE: 600; 310; 30; 20 INJECTION, SOLUTION INTRAVENOUS at 12:33

## 2023-11-13 RX ADMIN — DEXAMETHASONE SODIUM PHOSPHATE 4 MG: 4 INJECTION, SOLUTION INTRAMUSCULAR; INTRAVENOUS at 12:00

## 2023-11-13 RX ADMIN — ONDANSETRON 4 MG: 2 INJECTION INTRAMUSCULAR; INTRAVENOUS at 13:30

## 2023-11-13 RX ADMIN — PROPOFOL 200 MG: 10 INJECTION, EMULSION INTRAVENOUS at 11:54

## 2023-11-13 RX ADMIN — HYDROMORPHONE HYDROCHLORIDE 0.5 MG: 1 INJECTION, SOLUTION INTRAMUSCULAR; INTRAVENOUS; SUBCUTANEOUS at 14:31

## 2023-11-13 RX ADMIN — Medication 200 MCG: at 12:13

## 2023-11-13 RX ADMIN — Medication 200 MCG: at 12:32

## 2023-11-13 RX ADMIN — CEFAZOLIN SODIUM 2 G: 2 INJECTION, SOLUTION INTRAVENOUS at 15:20

## 2023-11-13 RX ADMIN — OXYCODONE HYDROCHLORIDE 5 MG: 5 TABLET ORAL at 14:58

## 2023-11-13 RX ADMIN — HYDROMORPHONE HYDROCHLORIDE 0.5 MG: 1 INJECTION, SOLUTION INTRAMUSCULAR; INTRAVENOUS; SUBCUTANEOUS at 15:18

## 2023-11-13 RX ADMIN — ROCURONIUM BROMIDE 100 MG: 10 INJECTION, SOLUTION INTRAVENOUS at 11:54

## 2023-11-13 RX ADMIN — TRANEXAMIC ACID 1000 MG: 1 INJECTION, SOLUTION INTRAVENOUS at 12:00

## 2023-11-13 RX ADMIN — CEFAZOLIN SODIUM 2 G: 2 SOLUTION INTRAVENOUS at 12:00

## 2023-11-13 RX ADMIN — FENTANYL CITRATE 50 MCG: 50 INJECTION, SOLUTION INTRAMUSCULAR; INTRAVENOUS at 13:41

## 2023-11-13 RX ADMIN — HYDROMORPHONE HYDROCHLORIDE 0.5 MG: 1 INJECTION, SOLUTION INTRAMUSCULAR; INTRAVENOUS; SUBCUTANEOUS at 14:47

## 2023-11-13 RX ADMIN — ONDANSETRON 4 MG: 2 INJECTION INTRAMUSCULAR; INTRAVENOUS at 14:31

## 2023-11-13 RX ADMIN — LIDOCAINE HYDROCHLORIDE 80 MG: 20 INJECTION, SOLUTION INFILTRATION; PERINEURAL at 11:54

## 2023-11-13 RX ADMIN — OXYCODONE HYDROCHLORIDE 5 MG: 5 TABLET ORAL at 15:47

## 2023-11-13 RX ADMIN — SUGAMMADEX 200 MG: 100 INJECTION, SOLUTION INTRAVENOUS at 13:49

## 2023-11-13 RX ADMIN — MIDAZOLAM HYDROCHLORIDE 2 MG: 1 INJECTION, SOLUTION INTRAMUSCULAR; INTRAVENOUS at 10:49

## 2023-11-13 RX ADMIN — FENTANYL CITRATE 50 MCG: 50 INJECTION, SOLUTION INTRAMUSCULAR; INTRAVENOUS at 13:38

## 2023-11-13 ASSESSMENT — PAIN DESCRIPTION - LOCATION
LOCATION: SHOULDER

## 2023-11-13 ASSESSMENT — COLUMBIA-SUICIDE SEVERITY RATING SCALE - C-SSRS
1. IN THE PAST MONTH, HAVE YOU WISHED YOU WERE DEAD OR WISHED YOU COULD GO TO SLEEP AND NOT WAKE UP?: NO
6. HAVE YOU EVER DONE ANYTHING, STARTED TO DO ANYTHING, OR PREPARED TO DO ANYTHING TO END YOUR LIFE?: NO
2. HAVE YOU ACTUALLY HAD ANY THOUGHTS OF KILLING YOURSELF?: NO

## 2023-11-13 ASSESSMENT — PAIN DESCRIPTION - DESCRIPTORS: DESCRIPTORS: ACHING;DULL

## 2023-11-13 ASSESSMENT — PAIN - FUNCTIONAL ASSESSMENT
PAIN_FUNCTIONAL_ASSESSMENT: 0-10
PAIN_FUNCTIONAL_ASSESSMENT: UNABLE TO SELF-REPORT
PAIN_FUNCTIONAL_ASSESSMENT: 0-10
PAIN_FUNCTIONAL_ASSESSMENT: UNABLE TO SELF-REPORT
PAIN_FUNCTIONAL_ASSESSMENT: 0-10

## 2023-11-13 ASSESSMENT — ACTIVITIES OF DAILY LIVING (ADL): HOME_MANAGEMENT_TIME_ENTRY: 13

## 2023-11-13 ASSESSMENT — PAIN SCALES - GENERAL
PAINLEVEL_OUTOF10: 7
PAINLEVEL_OUTOF10: 8
PAINLEVEL_OUTOF10: 3
PAINLEVEL_OUTOF10: 8
PAINLEVEL_OUTOF10: 2
PAINLEVEL_OUTOF10: 8
PAINLEVEL_OUTOF10: 3

## 2023-11-13 ASSESSMENT — PAIN DESCRIPTION - ORIENTATION
ORIENTATION: LEFT

## 2023-11-13 NOTE — PERIOPERATIVE NURSING NOTE
1355 Pt to bay 63 on simple face mask. Oral airway in place. Report given to this rn by or rn and aa.   1405 Pt awake, oral airway removed. Simple face mask removed. Pt on room air at this time.   1431 Pt medicated with Dilaudid 0.5mg iv for 8/10 pain  1438 Pt medicated with Dilaudid 0.5mg iv for 8/10 pain  1447 Pt medicated with Dilaudid 0.5mg iv for 8/10 pain  1459 Pt given amada cracker to eat. Pt took oxycodone by mouth for pain of 3/10.

## 2023-11-13 NOTE — ANESTHESIA PROCEDURE NOTES
Peripheral Block    Patient location during procedure: pre-op  Start time: 11/13/2023 10:49 AM  End time: 11/13/2023 11:00 AM  Reason for block: at surgeon's request and post-op pain management  Staffing  Performed: attending   Authorized by: Eliza Nevarez MD    Performed by: Eliza Nevarez MD  Preanesthetic Checklist  Completed: patient identified, IV checked, site marked, risks and benefits discussed, surgical consent, monitors and equipment checked, pre-op evaluation and timeout performed   Timeout performed at: 11/13/2023 10:48 AM  Peripheral Block  Patient position: sitting  Prep: ChloraPrep and site prepped and draped  Patient monitoring: heart rate, cardiac monitor and continuous pulse ox  Block type: interscalene and brachial plexus  Laterality: left  Injection technique: single-shot  Guidance: nerve stimulator and ultrasound guided  Local infiltration: lidocaine  Infiltration strength: 1 %  Dose: 2 mL  Needle  Needle type: short-bevel   Needle gauge: 22 G  Needle length: 5 cm  Needle localization: anatomical landmarks, nerve stimulator and ultrasound guidance  Test dose: negative  Assessment  Injection assessment: negative aspiration for heme, no paresthesia on injection, incremental injection and local visualized surrounding nerve on ultrasound  Paresthesia pain: none  Heart rate change: no  Slow fractionated injection: no  Additional Notes  Procedure related and patient specific complications discussed with patient after focussed neurological history.   Anticoagulation (if any) held per LULI guidelines.  Sedation with  Midazolam 2 mg I.v.  Aseptic prep and drape. 22G Simuplex needle used. Nerve stimulator with disappearance of motor twitches at 0.4 mA. 30 ml of 0.5% Bupivacaine with epi 1 in 200K and decadron 4 mg injected in 5 ml increments, No resistance to injection or high injection pressures based on tactile feedback. Meaningful verbal communication maintained throughout procedure. Ultrasound image  saved to chart. Patient tolerated procedure well with no immediate complications. Written instructions with precautions about how to take care of insensate extremity will be provided by PACU.

## 2023-11-13 NOTE — PERIOPERATIVE NURSING NOTE
1604 Dr villa bedside assess for continued pain score pt states 8/10 nerve block still intact pt patient states pain present  1605 OT bedside  1630 discharge instructions done with patient and family

## 2023-11-13 NOTE — ANESTHESIA PROCEDURE NOTES
Airway  Date/Time: 11/13/2023 11:55 AM  Urgency: elective    Airway not difficult    Staffing  Performed: TAMERA   Authorized by: Eliza Nevarez MD    Performed by: TAMERA Heck  Patient location during procedure: OR    Indications and Patient Condition  Indications for airway management: anesthesia  Spontaneous Ventilation: absent  Sedation level: deep  Preoxygenated: yes  Patient position: sniffing  MILS maintained throughout  Mask difficulty assessment: 1 - vent by mask    Final Airway Details  Final airway type: endotracheal airway      Successful airway: ETT  Cuffed: yes   Successful intubation technique: direct laryngoscopy  Blade: Kenneth  Blade size: #4  ETT size (mm): 7.5  Cormack-Lehane Classification: grade I - full view of glottis  Placement verified by: capnometry   Measured from: lips  ETT to lips (cm): 22  Number of attempts at approach: 1

## 2023-11-13 NOTE — OP NOTE
Left Shoulder Total Arthroplasty (L) Operative Note     Date: 2023  OR Location: [facility] A OR    Name: Luis Manuel Avitia, : 1950, Age: 72 y.o., MRN: [id number], Sex: male    Diagnosis  Pre-op Diagnosis     * Arthritis of left shoulder region [M19.012] Post-op Diagnosis     * Arthritis of left shoulder region [M19.012]     * Biceps tendinitis of left shoulder [M75.22]     Procedures  Left Shoulder Total Arthroplasty  59666 - VT ARTHROPLASTY GLENOHUMERAL JOINT TOTAL SHOULDER    VT TENODESIS LONG TENDON BICEPS [04899]  Surgeons      * Dario Cadena - Primary    Resident/Fellow/Other Assistant:  Surgeon(s) and Role:     * Michael Kingston DO - Resident - Assisting    Procedure Summary  Anesthesia: Consult  ASA: III  Anesthesia Staff: Anesthesiologist: Eliza Nevarez MD  C-AA: TAMERA Heck  Estimated Blood Loss: 75 mL  Intra-op Medications: * No intraprocedure medications in log *           Anesthesia Record               Intraprocedure I/O Totals          Intake    Propofol Drip 0.00 mL    The total shown is the total volume documented since Anesthesia Start was filed.    lactated Ringer's infusion 1000.00 mL    ceFAZolin (Ancef) IVPB 2 g/50 mL D5 (premix) 10.00 mL    Total Intake 1010 mL          Specimen: No specimens collected     Staff:   Circulator: Emmy Cueto RN  Relief Circulator: Chelsey Figueroa RN  Relief Scrub: Anastasia Bowen  Scrub Person: Vaughn Oakes; Lanny Hearn         Drains and/or Catheters: * None in log *    Tourniquet Times:         Implants:  Implants       Type Name Action Serial No.      Implant CEMENT, BONE, SIMPLEX P, RADIOPAQUE, FULL DOSE, 40 GM - SNA - [id number] Implanted NA      ALL POLY PEGGED GLENOID, E-PLUS, 46mm Implanted [id number]      NEUTRAL HUMERAL HEAD 46mm x 16mm Implanted [id number]      HUMERAL STEM AND NECK KIT, SZ 2, 21mm CANAL SPARING Implanted [id number]             IMPLANTS: DJO implants were used. CS Edge size 2 nucleus, 46 x  16 humeral head, 46 glenoid    INDICATIONS FOR PROCEDURE: The patient was diagnosed with end stage glenohumeral arthritis of the left shoulder. Clinical exam and imaging were consistent with an intact and well-functioning rotator cuff. Patient was having severe limitations in daily function as well as consistent pain after attempting nonoperative options. Treatment options were discussed. The patient was proposed to have total shoulder arthroplasty and other procedures as indicated. Reverse shoulder arthroplasty was available as backup depending on intraoperative findings. Risks and benefits of surgery and alternatives of treatment were discussed.  The patient understood all the risks and benefits and wanted to proceed with surgical option    DESCRIPTION OF PROCEDURE: Patient was met in the preoperative holding area. Consent for surgery was reviewed and the correct operative extremity was verified and marked. The patient then underwent a preoperative nerve block, please see anesthesia records regarding this. The patient was then brought to the operating room and was placed in a supine position on the operating table. SCDs were placed for DVT prophylaxis. General anesthesia was induced. The patient was placed in a beach chair position to around 30 degrees of inclination, and all the bony prominences were well padded. The operative upper extremity was prepped and draped in usual sterile fashion.  A time out was held confirming the correct patient, operative side, operative procedure, preoperative antibiotics, preoperative TXA, implants being present and that it was safe to proceed--all were in agreement it was safe to proceed.    Attention was directed to the operative shoulder.  A deltopectoral incision was made from just above to the coracoid to the deltoid insertion.  Skin was incised with a knife and the deep dermal layer was incised using electrocautery.  Dissection was carried out through the subcutaneous tissue  to the level of the deltoid fascia.  The skin flaps medially and laterally were then made and the cephalic vein was identified.  The cephalic vein was carefully mobilized laterally and any crossing vessels were cauterized.  The interval between the deltoid and pectoralis major was developed in the pectoralis major insertion was palpated.  About a centimeter of the upper portion of the pectoralis major was released.  The subdeltoid space and subacromial space was then carefully freed of adhesions using blunt dissection.  Clavipectoral fascia was then incised and the conjoined tendon was mobilized medially.  The axillary nerve was palpated underneath the conjoined tendon.  The anterior humeral circumflex vessels were identified and cauterized electrocautery.  Biceps tendon was then identified and noted to be partially torn and inflamed and tenodesed to the upper portion of the pectoralis major.  The biceps tunnel was then unroofed to the base of the coracoid and the biceps tendon was excised leaving a small stump still attached to the superior labrum.     The lower border of the subscapularis was then defined and marked with electrocautery.  The lesser tuberosity osteotomy was then performed using a curved and straight osteotome.  The osteotomy fragment was tagged with a suture to help with mobilization.  Using electrocautery and sequential external rotation of the shoulder, the capsule was removed superiorly to inferiorly and then inferiorly around the humeral neck.  Care was taken to protect the axillary nerve throughout the capsular release.  Osteophyte was visualized.  Capsule was released past the 6 o'clock position in the posterior inferior glenohumeral ligament was also released.  The shoulder was then able to be dislocated at this time.  At this point, the severity of disease was clear.  There was end-stage loss of cartilage and exposure of subchondral bone as well as significant osteophyte formation on the  humeral head.  Osteophytes were  removed using a rongeur to define the native humeral neck. The rotator cuff was intact. Protecting the rotator cuff, the humeral head was cut in native version, flush with the rotator cuff superiorly and without violating the bare area posterosuperiorly. A humeral head protector was placed.    Attention was then directed to the glenoid. A fukuda retractor was placed posteriorly behind the glenoid.  A blunt Hohmann was placed to protect the axillary nerve inferiorly.  The interval between subscapularis and capsule was dissected and the capsule was cut from inferior to superior using dissecting scissors.  The capsular flap was then excised, removing the majority of the inferior and anterior diseased capsule.  Retractors were then placed anteriorly and superiorly along the glenoid.  The remaining biceps stump and remaining anterior, inferior and posterior labrum were then removed using electrocautery.  Any remaining cartilage on the glenoid surface was removed using a Liriano.  The arthritic bony anatomy of the glenoid was then well visualized.  A guidepin was then placed at the midpoint of the glenoid surface. Reaming was performed to achieve full backside support for the implant. A peg drill guide was used to drill holes for the pegs and the central peg. The glenoid surface was irrigated and dried. Placing cement into the central and superior peg holes, the final glenoid implant was placed.     Attention was then redirected to the humerus.  The proximal humerus was prepped for a stemless implant using a guide wire, reamers and a fin punch. A trial humeral head based on the size of the humeral head was chosen. The shoulder was reduced. Different humeral head sizes were trialed until the final one was selected, which was the one that had good stability and 50% posterior translation with quick bounce back. Trial implants were then removed and the final stemless nucleus and humeral head were  impacted.     The lesser tuberosity osteotomy was then repaired.  Prior to the final humeral head insertion, a fibertape suture was placed around the pike taper. 3 drill holes were placed superior to inferior in the bicipital groove and 3 fibertape sutures were passed at the bone tendon junction of the lesser tuberosity.  The sutures were then passed sequentially through the drill holes, exiting in the lateral cortex. The two tails of the suture around the humeral head were also passed through the superior and inferior drill holes. Sutures were then tied to secure the lesser tuberosity. Excellent and stable fixation of the lesser tuberosity osteotomy was achieved.    Copious irrigation was performed. Dilute betadine was used to wash the wound as well. 1 gram of vancomycin powder was placed in the wound. #1 Vicryl was used to close the deep tissue layer superficial to the deltopectoral interval, 2-0 Vicryl was used to for subcutaneous closure, and 3-0 monocryl was used to close the skin. Dermabond was applied and a sterile, water proof dressing was placed.  The patient was placed in a sling with an abduction pillow.  The patient was brought out from general anesthesia without any complications and was transported to postanesthesia care unit in good condition.    ATTESTATION: Dr. Cadena was present for the entirety of the procedure and personally performed all aspects of this procedure.  Dragon software was used to dictate this note, please be aware that minor errors in transcription may be present.      Dario Cadena MD    Shoulder/Elbow Surgery  [facility] Cadena  Phone Number: [phone]

## 2023-11-13 NOTE — PROGRESS NOTES
Occupational Therapy                 Therapy Communication Note    Patient Name: Luis Manuel Avitia  MRN: [id number]  Today's Date: 11/13/2023     Discipline: Occupational Therapy    Other (Comment) (See paper chart for full OT eval and treatment)

## 2023-11-13 NOTE — ANESTHESIA POSTPROCEDURE EVALUATION
Patient: Luis Manuel HAMMONDS Stapel    Procedure Summary       Date: 11/13/23 Room / Location: [facility] OR 18 / [facility] OR    Anesthesia Start: 1140 Anesthesia Stop: 1359    Procedure: Left Shoulder Total Arthroplasty (Left: Shoulder) Diagnosis:       Arthritis of left shoulder region      Biceps tendinitis of left shoulder      (Arthritis of left shoulder region [M19.012])    Surgeons: Dario Cadena MD Responsible Provider: Eliza Nevarez MD    Anesthesia Type: general ASA Status: 3            Anesthesia Type: general    Vitals Value Taken Time   /63 11/13/23 1400   Temp 36 °C (96.8 °F) 11/13/23 1355   Pulse 68 11/13/23 1415   Resp 18 11/13/23 1415   SpO2 95 % 11/13/23 1415       Anesthesia Post Evaluation    Patient location during evaluation: bedside  Level of consciousness: awake  Pain management: adequate  Multimodal analgesia pain management approach  Airway patency: patent  Cardiovascular status: stable  Respiratory status: spontaneous ventilation and unassisted  Hydration status: acceptable  Comments: No significant PONV.        No notable events documented.

## 2023-11-17 DIAGNOSIS — R07.89 OTHER CHEST PAIN: ICD-10-CM

## 2023-11-20 RX ORDER — ATORVASTATIN CALCIUM 80 MG/1
80 TABLET, FILM COATED ORAL DAILY
Qty: 30 TABLET | Refills: 12 | Status: SHIPPED | OUTPATIENT
Start: 2023-11-20

## 2023-11-28 NOTE — PROGRESS NOTES
History: Patient returns to the office status post L TSA on 2 weeks ago. Patient has been immobilized in a sling and pain is well controlled. Denies numbness/tingling.     Past medical history, past surgical history, medications, allergies, family history, social history, and review of systems were reviewed today and have been documented separately in this encounter.   A 12 point review of systems was negative other than as stated in the HPI.    Physical Examination:    On exam of the left upper extremity, incisions are well healed, without significant erythema or drainage, dressing removed today. Demonstrates full ROM of the elbow/wrist/hand. Neurovascularly intact throughout.    Imaging: Xrs of the operative shoulder were performed today. Implants in stable alignment. No acute complications noted.     Assessment: 2 weeks s/p L TSA     Plan: At this point we will start PROM exercises in FF and ER and start PT. These were taught to the patient today. Otherwise, remain in the sling, no AROM. We will see them back in 3-4 weeks. All questions answered.     Dragon software was used to dictate this note, please be aware that minor errors in transcription may be present.    Dario Cadena MD    Shoulder/Elbow Surgery  [facility] / [facility]

## 2023-11-29 ENCOUNTER — ANCILLARY PROCEDURE (OUTPATIENT)
Dept: RADIOLOGY | Facility: CLINIC | Age: 73
End: 2023-11-29
Payer: COMMERCIAL

## 2023-11-29 ENCOUNTER — OFFICE VISIT (OUTPATIENT)
Dept: ORTHOPEDIC SURGERY | Facility: CLINIC | Age: 73
End: 2023-11-29
Payer: COMMERCIAL

## 2023-11-29 VITALS — HEIGHT: 67 IN | BODY MASS INDEX: 32.49 KG/M2 | WEIGHT: 207 LBS

## 2023-11-29 DIAGNOSIS — Z96.612 AFTERCARE FOLLOWING LEFT SHOULDER JOINT REPLACEMENT SURGERY: ICD-10-CM

## 2023-11-29 DIAGNOSIS — Z47.1 AFTERCARE FOLLOWING LEFT SHOULDER JOINT REPLACEMENT SURGERY: ICD-10-CM

## 2023-11-29 DIAGNOSIS — Z96.612 AFTERCARE FOLLOWING LEFT SHOULDER JOINT REPLACEMENT SURGERY: Primary | ICD-10-CM

## 2023-11-29 DIAGNOSIS — Z47.1 AFTERCARE FOLLOWING LEFT SHOULDER JOINT REPLACEMENT SURGERY: Primary | ICD-10-CM

## 2023-11-29 PROCEDURE — 73030 X-RAY EXAM OF SHOULDER: CPT | Mod: LT,FY

## 2023-11-29 PROCEDURE — 1036F TOBACCO NON-USER: CPT | Performed by: STUDENT IN AN ORGANIZED HEALTH CARE EDUCATION/TRAINING PROGRAM

## 2023-11-29 PROCEDURE — 1126F AMNT PAIN NOTED NONE PRSNT: CPT | Performed by: STUDENT IN AN ORGANIZED HEALTH CARE EDUCATION/TRAINING PROGRAM

## 2023-11-29 PROCEDURE — 99024 POSTOP FOLLOW-UP VISIT: CPT | Performed by: STUDENT IN AN ORGANIZED HEALTH CARE EDUCATION/TRAINING PROGRAM

## 2023-11-29 PROCEDURE — 73030 X-RAY EXAM OF SHOULDER: CPT | Mod: LEFT SIDE | Performed by: RADIOLOGY

## 2023-11-29 PROCEDURE — 1159F MED LIST DOCD IN RCRD: CPT | Performed by: STUDENT IN AN ORGANIZED HEALTH CARE EDUCATION/TRAINING PROGRAM

## 2023-11-29 RX ORDER — OXYCODONE HYDROCHLORIDE 5 MG/1
5 TABLET ORAL EVERY 6 HOURS PRN
Qty: 28 TABLET | Refills: 0 | Status: SHIPPED | OUTPATIENT
Start: 2023-11-29 | End: 2023-12-06

## 2023-11-29 ASSESSMENT — PAIN SCALES - GENERAL: PAINLEVEL_OUTOF10: 5 - MODERATE PAIN

## 2023-11-29 ASSESSMENT — PAIN DESCRIPTION - DESCRIPTORS: DESCRIPTORS: ACHING;TENDER;SORE

## 2023-11-29 ASSESSMENT — PAIN - FUNCTIONAL ASSESSMENT: PAIN_FUNCTIONAL_ASSESSMENT: 0-10

## 2023-12-05 ENCOUNTER — EVALUATION (OUTPATIENT)
Dept: PHYSICAL THERAPY | Facility: CLINIC | Age: 73
End: 2023-12-05
Payer: COMMERCIAL

## 2023-12-05 DIAGNOSIS — M25.512 LEFT SHOULDER PAIN: Primary | ICD-10-CM

## 2023-12-05 PROCEDURE — 97110 THERAPEUTIC EXERCISES: CPT | Mod: GP

## 2023-12-05 PROCEDURE — 97161 PT EVAL LOW COMPLEX 20 MIN: CPT | Mod: GP

## 2023-12-05 NOTE — PROGRESS NOTES
Physical Therapy Evaluation     Patient Name: Luis Manuel Avitia  MRN: [id number]  Today's Date: 12/5/2023         Insurance:  Number of Treatments Authorized: 1/[place], copay = $20, 100% coverage          Current Problem:   No diagnosis found.    Precautions:  Precautions  Precautions Comment: DOS: 11/13/23 Total GHJ replacement, Next POV is 12/29/23. No IR past glute/no active IR, No ER > 30 degrees. Pt did not bring protocol.    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 Cadena    Initial onset: 5 years ago  Commenced for no apparent reason  Limitation/Disability prior to surgery: interfering with self.  Self care and ADLs were painful and limited. Unable to do most overhead activities.    Previous treatments: PT - did not improve it.      Work, mechanical stresses: Retired.   Leisure, mechanical stresses: Not much in the winter.  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   Functional disability since surgery: Assist with don/doffing coat.  Independent with most self care.  Things are improving since onset.   Available assist: Wife to assist PRN  Equipment/AD: Wears sling at night and for pain relief.  Constant symptoms: Left ant GHJ - take tylenol for pain relief   Intermittent symptoms: none  Pain ranges from: 2-7/10, usually at 2-3/10  Pt describes pain as: ache  Symptoms are worse with: reaching out to the side.   Symptoms are better with: rest/sling  Imaging: xray - looked good.  Medical history: Heart disease, high blood pressure, cardiac surgery 2022, Right RTC repair 2022    Objective Findings:  Outcome Measures:  Other Measures  Disability of Arm Shoulder Hand (DASH): 59.09  GHJ ROM - Nil/Min/Mod/Max loss or degrees.  AROM:  Ext: R = 56, L = NT  Functional IR: R = Right lateral glute , L = lateral  L hip   PROM:   Flex: R = 140, L = 90, ERP     Ext: R = NT, L = 40  ABd:  R = nil limit , L =  74                             ER:  R = nil limit, L = 15,  ERP  Treatment x 5 reps each:  - Supine Shoulder Flexion AAROM with Hands Clasped   - Supine Shoulder External Rotation with Dowel   - Standing Shoulder Abduction AAROM with Dowel   - Standing Shoulder Extension ROM with Dowel   - Seated Shoulder Flexion Self PROM  Educated pt on proper response to exercise, produce/increase - NW principle, and healing process.  Issued handout for HEP  HEP/med bridge:   Access Code: [email or web address]  URL: [email or web address]  Date: 12/05/2023  Prepared by: Krunal Bazan  Exercises  - Supine Shoulder Flexion AAROM with Hands Clasped  - 3-5 x daily - 7 x weekly - 1 sets - 10-20 reps  - Supine Shoulder External Rotation with Dowel  - 3-5 x daily - 7 x weekly - 1 sets - 10-20 reps - 5 hold  - Standing Shoulder Abduction AAROM with Dowel  - 3-5 x daily - 7 x weekly - 1 sets - 10-20 reps - 5 hold  - Standing Shoulder Extension ROM with Dowel  - 3-5 x daily - 7 x weekly - 1 sets - 10-20 reps - 5 hold  - Seated Shoulder Flexion Self PROM  - 3-5 x daily - 7 x weekly - 1 sets - 10-20 reps    Assessment: Pt presents to PT with complaint of Left GHJ pain s/p total GHJ repalcement by Dr. Cadena on 11/13/23. Pt will benefit from skilled PT to address ROM/strength/functional limitations and pain noted during evaluation today.    Plan of Care:  Problem List: Pain, Functional limitations, activity limitations, ROM limitations, Posture, Gait, Transfer, Activity Limitations, IADLS/ADLS/Self care  Goals:  STG:  Pain = 0-3/10 L GHJ by week 4  Pt will be able to don/doff his coat/jacket independently by week 4  Pt will be able to reach out of his car window without limitation form left GHJ pain by week 6   LTG:  AROM L GHJ= nil limit in all directions by week 10  QuickDash >/= 30 by week 10  Pt will report >/= 80% improvement/progress towards PT goals by week 10  Pt will be able to reach overhead to grab a cup with Left UE by week 8  Planned interventions: Ther ex, Neuromuscular re-ed,  ther act, Manual, Education, Home program, Estim, Hot therapy, Cold therapy, Vaso

## 2023-12-06 DIAGNOSIS — L30.9 ECZEMA, UNSPECIFIED TYPE: Primary | ICD-10-CM

## 2023-12-07 RX ORDER — MOMETASONE FUROATE 1 MG/G
CREAM TOPICAL DAILY
Qty: 21 G | Refills: 0 | Status: SHIPPED | OUTPATIENT
Start: 2023-12-07 | End: 2024-04-23 | Stop reason: ALTCHOICE

## 2023-12-15 ENCOUNTER — APPOINTMENT (OUTPATIENT)
Dept: PHYSICAL THERAPY | Facility: CLINIC | Age: 73
End: 2023-12-15
Payer: COMMERCIAL

## 2023-12-19 ENCOUNTER — TREATMENT (OUTPATIENT)
Dept: PHYSICAL THERAPY | Facility: CLINIC | Age: 73
End: 2023-12-19
Payer: COMMERCIAL

## 2023-12-19 DIAGNOSIS — M25.512 LEFT SHOULDER PAIN: ICD-10-CM

## 2023-12-19 PROCEDURE — 97140 MANUAL THERAPY 1/> REGIONS: CPT | Mod: GP

## 2023-12-19 PROCEDURE — 97110 THERAPEUTIC EXERCISES: CPT | Mod: GP

## 2023-12-19 NOTE — PROGRESS NOTES
Physical Therapy  Physical Therapy Treatment Note    Patient Name: Luis Manuel Avitia  MRN: [id number]  Today's Date: 12/19/2023       Insurance:  Number of Treatments Authorized: 2/[place], copay = $20, 100% coverage        Current Problem  1. Left shoulder pain  Follow Up In Physical Therapy          Precautions  Precautions  Precautions Comment: DOS: 11/13/23 Total GHJ replacement, Next POV is 12/29/23. No IR past glute/no active IR, No ER > 30 degrees. Pt did not bring protocol.    Subjective   General     Patient reports:  Feels like he is getting the arm strength back faster as compared to the reverse total GHJ on the right.      Performing HEP?: Partially  Not much because it will hurt afterwards.     Pain   0/10 at rest Left GHJ     Objective     Treatments:  Therex:   OTD pulleys flexion/scap x 3 min each direction   HEP review L GHJ with AAROM:  Ext with PVC x 1 mi  Flexion with opp UE assist x 10  Abd with PVC x 1 min   Supine flexion with opp UE assist x 10  Supine ER with PVC x 10   Manual:   Left GHJ PROM: Flex/abd/ER/IR - within precautions  Left GHJ pos/inf mobs grade 1-2   STM to Left Delt/bicep/UT/pec  Therex:  B GHJ pos rolls x 1 min   Red band bicep curl 1 min x 2  Red band tricep ext x  1 min -stopped due to pain   Left wrist alt pronation/supination 2#s 1 min x 2   Left wrist ext 2#s 1 min x 2   Left wrist flexion 4#s 1 min x 2    OP EDUCATION:   On proper sx response to exercise/technique with HEP.     Assessment:  Pt is slowly progressing, PROM is improved but has poor tolerance to AAROM/HEP per subjective report. Tolerated reviewed HEP well after cues ofr proper technique/sx response.  Pt demo'd understanding of education above.        Plan:  F/U with tolerance to HEP/proper technique  Next POV 12/27/23  OP PT Plan  Number of Treatments Authorized: 2/MN, copay = $20, 100% coverage      Krunal Bazan, PT

## 2023-12-21 ENCOUNTER — TREATMENT (OUTPATIENT)
Dept: PHYSICAL THERAPY | Facility: CLINIC | Age: 73
End: 2023-12-21
Payer: COMMERCIAL

## 2023-12-21 DIAGNOSIS — M25.512 LEFT SHOULDER PAIN: ICD-10-CM

## 2023-12-21 PROCEDURE — 97140 MANUAL THERAPY 1/> REGIONS: CPT | Mod: GP

## 2023-12-21 PROCEDURE — 97110 THERAPEUTIC EXERCISES: CPT | Mod: GP

## 2023-12-21 NOTE — PROGRESS NOTES
Physical Therapy  Physical Therapy Treatment Note    Patient Name: Luis Manuel Avitia  MRN: [id number]  Today's Date: 12/21/2023       Insurance:  Number of Treatments Authorized: 3/[place], copay = $20, 100% coverage        Current Problem  1. Left shoulder pain  Follow Up In Physical Therapy          Precautions  Precautions  Precautions Comment: DOS: 11/13/23 Total GHJ replacement, Next POV is 12/27/23. No IR past glute/no active IR, No ER > 30 degrees. Pt did not bring protocol.    Subjective   General     Patient reports:  Had some stiffness after he left PT last time.  Was achy yesterday but it better today.  Feels like he is getting the arm strength back faster as compared to the reverse total GHJ on the right.      Performing HEP?: No    Pain   0/10 at rest Left GHJ, 2/10 with movement     Objective     Treatments:  Therex:   OTD pulleys flexion/scap x 3 min each direction   L GHJ :  Seated ER with PVC x 2 min - gentle/small range  Abd with PVC x 2 min   Red band in door  B Ext 1 min x 2  B Row 1 min x 2   Manual:   Left GHJ PROM: Flex/abd/ER/IR - within precautions  Left GHJ pos/inf mobs grade 1-2   STM to Left Delt/bicep/UT/pec  Therex: Supine Left GHJ flexion AAROM x 10    OP EDUCATION:   On proper sx response to exercise/technique with HEP.  Encouraged to attempt gentle AAROM at home even if GHJ  is sore/stiff    Assessment:  Tolerated the session well, ROM is progressing well as noted during PROM.  Pt is hesitant to attempt to move the GHJ if feeling any ache or stiffness.  Overall reports good progress with function and decreasing PT. Was educated on proper sx response and encouraged to attempt HEP and then reassess sx. Pt demo'd understanding.         Plan:  F/U with tolerance to HEP/proper technique  Next POV 12/27/23  OP PT Plan  Number of Treatments Authorized: 3/MN, copay = $20, 100% coverage      Krunal Bazan, PT

## 2023-12-26 NOTE — PROGRESS NOTES
History: Patient returns to the office status post L TSA on 6 weeks ago. Denies numbness/tingling. Doing well, pain controlled. ***    Past medical history, past surgical history, medications, allergies, family history, social history, and review of systems were reviewed today and have been documented separately in this encounter.   A 12 point review of systems was negative other than as stated in the HPI.    Physical Examination:    On exam of the left upper extremity, incisions are well healed, without significant erythema or drainage. Demonstrates full ROM of the elbow/wrist/hand. Neurovascularly intact throughout. Passive ROM of the operative shoulder ***    Imaging: Xrs of the operative shoulder were performed today. Implants in stable alignment. No acute complications noted.     Assessment: 6 weeks s/p L TSA     Plan:   ***Patient is doing well, continue PT and ROM and strengthening as tolerated. We will see them back around 3 months postoperatively. All questions answered.    Dragon software was used to dictate this note, please be aware that minor errors in transcription may be present.    Dario Cadena MD    Shoulder/Elbow Surgery  [facility]

## 2023-12-27 ENCOUNTER — TELEPHONE (OUTPATIENT)
Dept: PRIMARY CARE | Facility: CLINIC | Age: 73
End: 2023-12-27
Payer: COMMERCIAL

## 2023-12-27 ENCOUNTER — APPOINTMENT (OUTPATIENT)
Dept: ORTHOPEDIC SURGERY | Facility: CLINIC | Age: 73
End: 2023-12-27
Payer: COMMERCIAL

## 2023-12-27 DIAGNOSIS — J32.9 SINUSITIS, UNSPECIFIED CHRONICITY, UNSPECIFIED LOCATION: Primary | ICD-10-CM

## 2023-12-27 RX ORDER — AMOXICILLIN AND CLAVULANATE POTASSIUM 875; 125 MG/1; MG/1
875 TABLET, FILM COATED ORAL 2 TIMES DAILY
Qty: 14 TABLET | Refills: 0 | Status: SHIPPED | OUTPATIENT
Start: 2023-12-27 | End: 2024-01-03

## 2023-12-27 NOTE — TELEPHONE ENCOUNTER
Luis Manuel called stating that he thinks that he may have a sinus infection.  He is asking if you would be willing to call in an antibiotic for him?

## 2023-12-28 ENCOUNTER — TREATMENT (OUTPATIENT)
Dept: PHYSICAL THERAPY | Facility: CLINIC | Age: 73
End: 2023-12-28
Payer: COMMERCIAL

## 2023-12-28 DIAGNOSIS — M25.512 LEFT SHOULDER PAIN: ICD-10-CM

## 2023-12-28 PROCEDURE — 97110 THERAPEUTIC EXERCISES: CPT | Mod: GP

## 2023-12-28 PROCEDURE — 97140 MANUAL THERAPY 1/> REGIONS: CPT | Mod: GP

## 2023-12-28 NOTE — PROGRESS NOTES
Physical Therapy  Physical Therapy Treatment Note    Patient Name: Luis Manuel Avitia  MRN: [id number]  Today's Date: 12/28/2023  Time Calculation  Start Time: 1452    Insurance:  Number of Treatments Authorized: 4/[place], copay = $20, 100% coverage        Current Problem  1. Left shoulder pain  Follow Up In Physical Therapy          Precautions  Precautions  Precautions Comment: DOS: 11/13/23 Total GHJ replacement, Next POV is 1/3/24. No IR past glute/no active IR, No ER > 30 degrees. Pt did not bring protocol.    Subjective   General     Patient reports:  Did some shopping earlier and had to lift a few bags. Feels like things are improving.  HEP is going better. Felt fine after the last session.       Performing HEP?: partially     Pain   2/10 at rest Left GHJ    Objective     Treatments:  Therex:   OTD pulleys flexion/scap x 3 min each direction   Left GHJ Isometrics: Flex, abd, IR, ER 1 min x 2 each   Red band in door  B Ext 1 min x 2  B Row 1 min x 2   Supine Left GHJ flexion/chest press AAROM with PVC 10 x 2  Manual:   Left GHJ PROM: Flex/abd/ER/IR - within precautions  Left GHJ pos/inf mobs grade 2   Left arm pull MFR    OP EDUCATION:      Assessment:  Pt is making good progress, tolerance therex/HEP and function is improving without increasing pain.  His compliance has increased with HEP and PROM/flexibility is increasing.     Plan:  Progress to AROM as able  Slowly porgress periscap strengthening and RTC strengthening.   Next POV 1/3/24  OP PT Plan  Number of Treatments Authorized: 4/[place], copay = $20, 100% coverage      Krunal Bazan, PT

## 2024-01-02 ENCOUNTER — TREATMENT (OUTPATIENT)
Dept: PHYSICAL THERAPY | Facility: CLINIC | Age: 74
End: 2024-01-02
Payer: COMMERCIAL

## 2024-01-02 DIAGNOSIS — M25.512 LEFT SHOULDER PAIN: ICD-10-CM

## 2024-01-02 PROCEDURE — 97110 THERAPEUTIC EXERCISES: CPT | Mod: GP

## 2024-01-02 PROCEDURE — 97140 MANUAL THERAPY 1/> REGIONS: CPT | Mod: GP

## 2024-01-02 NOTE — PROGRESS NOTES
History: Patient returns to the office status post L TSA on 7 weeks ago. Denies numbness/tingling. Doing well, pain controlled. Working with PT.     Past medical history, past surgical history, medications, allergies, family history, social history, and review of systems were reviewed today and have been documented separately in this encounter.   A 12 point review of systems was negative other than as stated in the HPI.    Physical Examination:    On exam of the left upper extremity, incisions are well healed, without significant erythema or drainage. Demonstrates full ROM of the elbow/wrist/hand. Neurovascularly intact throughout. Active FF to 150, ER to 50, IR to T12.     Imaging: Xrs of the operative shoulder were performed today. Implants in stable alignment. No acute complications noted.     Assessment: 6 weeks s/p L TSA     Plan:   Patient is doing well, continue PT and ROM and strengthening as tolerated. We will see them back around 3 months postoperatively. All questions answered. Please get new shoulder Xrs at the next visit.     Dragon software was used to dictate this note, please be aware that minor errors in transcription may be present.    Dario Cadena MD    Shoulder/Elbow Surgery  [facility]

## 2024-01-02 NOTE — PROGRESS NOTES
Physical Therapy  Physical Therapy Treatment Note    Patient Name: Luis Manuel Avitia  MRN: [id number]  Today's Date: 1/2/2024  Time Calculation  Start Time: 1046    Insurance:  Number of Treatments Authorized: 5/[place], copay = $20, 100% coverage        Current Problem  1. Left shoulder pain  Follow Up In Physical Therapy          Precautions  Precautions  Precautions Comment: DOS: 11/13/23 Total GHJ replacement, Next POV is 1/3/24. No IR past glute/no active IR, No ER > 30 degrees. Pt did not bring protocol.    Subjective   General     Patient reports:  The GHJ  is feeling pretty good today.  Was fine after the last session.  Things are continuing to improve. Has the follow up with the surgeon tomorrow morning.       Performing HEP?: partially     Pain   Left GHJ - did not rate.     Objective     Treatments:  Therex:   Scifit UE no resistance CW/CCW  x 3 min each direction   L GHJ AROM flexion, Abd, ER 10 x 2 - cues to decrease UT substitution   Left GHJ Isometrics with yellow band: Flex, abd, IR, ER 1 min x 2 each direction   Matrix:  B Ext 7.5#s 1 min x 2  B Row 7.5#s 1 min x 2   Right GHJ flexion, scaption wall scrub x 10 each direction   Manual:   Left GHJ PROM: Flex/abd/ER/IR - within precautions  Left GHJ pos/inf mobs grade 2   Left arm pull MFR    OP EDUCATION:      Assessment:  Pt is making good progress per subjective report of decreasing pain and ability to complete AROM flex/abd overhead today.  Demo'd UT sub but was able to reduce it with cues.     Plan:  Progress to AROM as able  Slowly porgress periscap strengthening and RTC strengthening.   Next POV 1/3/24  OP PT Plan  Number of Treatments Authorized: 5/[place], copay = $20, 100% coverage      Krunal Bazan, PT

## 2024-01-03 ENCOUNTER — ANCILLARY PROCEDURE (OUTPATIENT)
Dept: RADIOLOGY | Facility: CLINIC | Age: 74
End: 2024-01-03
Payer: COMMERCIAL

## 2024-01-03 ENCOUNTER — OFFICE VISIT (OUTPATIENT)
Dept: ORTHOPEDIC SURGERY | Facility: CLINIC | Age: 74
End: 2024-01-03
Payer: COMMERCIAL

## 2024-01-03 DIAGNOSIS — Z47.1 AFTERCARE FOLLOWING LEFT SHOULDER JOINT REPLACEMENT SURGERY: Primary | ICD-10-CM

## 2024-01-03 DIAGNOSIS — Z96.612 AFTERCARE FOLLOWING LEFT SHOULDER JOINT REPLACEMENT SURGERY: ICD-10-CM

## 2024-01-03 DIAGNOSIS — Z96.612 AFTERCARE FOLLOWING LEFT SHOULDER JOINT REPLACEMENT SURGERY: Primary | ICD-10-CM

## 2024-01-03 DIAGNOSIS — Z47.1 AFTERCARE FOLLOWING LEFT SHOULDER JOINT REPLACEMENT SURGERY: ICD-10-CM

## 2024-01-03 PROCEDURE — 73030 X-RAY EXAM OF SHOULDER: CPT | Mod: LEFT SIDE | Performed by: STUDENT IN AN ORGANIZED HEALTH CARE EDUCATION/TRAINING PROGRAM

## 2024-01-03 PROCEDURE — 1126F AMNT PAIN NOTED NONE PRSNT: CPT | Performed by: STUDENT IN AN ORGANIZED HEALTH CARE EDUCATION/TRAINING PROGRAM

## 2024-01-03 PROCEDURE — 73030 X-RAY EXAM OF SHOULDER: CPT | Mod: LT

## 2024-01-03 PROCEDURE — 99024 POSTOP FOLLOW-UP VISIT: CPT | Performed by: STUDENT IN AN ORGANIZED HEALTH CARE EDUCATION/TRAINING PROGRAM

## 2024-01-03 PROCEDURE — 1036F TOBACCO NON-USER: CPT | Performed by: STUDENT IN AN ORGANIZED HEALTH CARE EDUCATION/TRAINING PROGRAM

## 2024-01-04 ENCOUNTER — TREATMENT (OUTPATIENT)
Dept: PHYSICAL THERAPY | Facility: CLINIC | Age: 74
End: 2024-01-04
Payer: COMMERCIAL

## 2024-01-04 DIAGNOSIS — M25.512 LEFT SHOULDER PAIN: ICD-10-CM

## 2024-01-04 PROCEDURE — 97140 MANUAL THERAPY 1/> REGIONS: CPT | Mod: GP

## 2024-01-04 PROCEDURE — 97110 THERAPEUTIC EXERCISES: CPT | Mod: GP

## 2024-01-04 NOTE — PROGRESS NOTES
Physical Therapy  Physical Therapy Treatment Note    Patient Name: Luis Manuel Avitia  MRN: [id number]  Today's Date: 1/4/2024       Insurance:           Current Problem  1. Left shoulder pain  Follow Up In Physical Therapy          Precautions       Subjective   General     Patient reports:  The surgeon was pleased with his progress.  Will have another follow up in 2 months. The GHJ is feeling good, no pain. AROM is going well. Was a bit stiff in the morning yesterday but not too bad.     Performing HEP?: partially     Pain   0/10 Left GHJ     Objective     Treatments:  Therex:   Scifit UE no resistance CW/CCW  x 3 min each direction   L GHJ AROM flexion, Abd 12 x 2   Gentle L GHJ functional IR AROM  x 10  L GHJ yellow band 10 x 2 each:   IR, small range  ER, small range   single arm chest press/SA punch   Abd, small range  Matrix:  Left GHJ AAROM flexion 2.5#s 10 x 2   Left GHJ AAROM extension 2.5#s 10 x 2   Manual:   Left GHJ PROM: Flex/abd/ER/IR - within precautions  Left GHJ pos/inf mobs grade 2   Left scpa ret/depression mobs  STM left bicep, delt, UT/LS  Left arm pull MFR    OP EDUCATION:      Assessment:  Pt is progressing well, quality and range with PROM is improving/increasing.  Was able to progress to isotonic strengthening today, within a small range. Had good range and tolerance to functional IR, was educated on not pushing into pain with this exercise, pt demo'd understanding.     Plan:  Progress to AROM as able  Slowly porgress periscap strengthening and RTC strengthening.        Krunal Bazan, PT

## 2024-01-08 ENCOUNTER — TREATMENT (OUTPATIENT)
Dept: PHYSICAL THERAPY | Facility: CLINIC | Age: 74
End: 2024-01-08
Payer: COMMERCIAL

## 2024-01-08 DIAGNOSIS — M25.512 LEFT SHOULDER PAIN: ICD-10-CM

## 2024-01-08 PROCEDURE — 97110 THERAPEUTIC EXERCISES: CPT | Mod: GP

## 2024-01-08 PROCEDURE — 97140 MANUAL THERAPY 1/> REGIONS: CPT | Mod: GP

## 2024-01-08 NOTE — PROGRESS NOTES
Physical Therapy  Physical Therapy Treatment Note    Patient Name: Luis Manuel Avitia  MRN: [id number]  Today's Date: 1/8/2024       Insurance:           Current Problem  1. Left shoulder pain  Follow Up In Physical Therapy          Precautions       Subjective   General     Patient reports:  A little tender after the next session for the rest of the day and a bit into the next day.  It went away after that. Had to take a day off but not too bad.     Performing HEP?: partially     Pain   0/10 Left GHJ     Objective     Treatments:  Therex:   Scifit UE no resistance CW/CCW  x 3 min each direction   Matrix:  Left GHJ AAROM assisted with 2.5#s 1 min x 2   Left GHJ AAROM assisted with 2.5#s 1 min x 2   L GHJ AROM flexion, Abd 12 x 2   Gentle L GHJ functional IR AROM  x 10  L GHJ yellow band 10 x 2 each:   IR, small range 10 x 2   Flexion  ER, small range   Abd, small range  Supine with yellow band:   Left resisted end range 10 x 2   Supine B Hor abd resisted end range 10 x 2   Manual:   Left GHJ PROM: Flex/abd/ER/IR - within precautions  Left GHJ pos/inf mobs grade 2   Left scap ret/depression mobs  STM left bicep, delt, UT/LS  Left arm pull MFR    OP EDUCATION:      Assessment:  Pt is tolerated there ex progressions well.  Able to complete strengthening with increase range and no lasting increase in sx.  Overall making good progress.     Plan:  Progress to AROM as able  Slowly porgress periscap strengthening and RTC strengthening.        Krunal Bazan, PT

## 2024-01-10 ENCOUNTER — TREATMENT (OUTPATIENT)
Dept: PHYSICAL THERAPY | Facility: CLINIC | Age: 74
End: 2024-01-10
Payer: COMMERCIAL

## 2024-01-10 DIAGNOSIS — M25.512 LEFT SHOULDER PAIN: ICD-10-CM

## 2024-01-10 PROCEDURE — 97110 THERAPEUTIC EXERCISES: CPT | Mod: GP

## 2024-01-10 NOTE — PROGRESS NOTES
Physical Therapy  Physical Therapy RECHECK/Treatment Note    Patient Name: Luis Manuel Avitia  MRN: [id number]  Today's Date: 1/10/2024       Insurance:  Number of Treatments Authorized: 8/[place], copay = $20, 100% coverage        Current Problem  1. Left shoulder pain  Follow Up In Physical Therapy          Precautions  Precautions  Precautions Comment: DOS: 11/13/23 Total GHJ replacement, Next POV is 1/3/24. No IR past glute/no active IR, No ER > 30 degrees. Pt did not bring protocol. (Next POV is in late Feb/Early March 2024)    Subjective   General     Patient reports:  Some stiffness today no pain. Was able to  a case of coke, was able to do it but it felt heavy. No issues with HEP.  Was a little after the last session but no pain. Arm strengthen is the biggest limitation.  Pain and ROM is much improved.     Performing HEP?: partially     Pain   0/10 Left GHJ     Objective   R GHJ ROM (degrees):  Flex: Active= 122, ERP. PROM = 128   ABd: Active = 115, ERP.  PROM = 121   Functional IR: T12     MMT Extremity: R GHJ   Flex: 4/5  Abd: 4+/5  ER: 4/5        Treatments:  Therex:   Scifit UE quickstart CW/CCW  x 3 min each direction   Matrix:  Left GHJ AAROM assisted with 5#s 1 min x 2   Left GHJ AAROM assisted with 5#s 1 min x 2   Left GHJ 2# overhead press 2#s 10 x 2  Left bicep curl 5# Kb 1 min x 2  [place] carry L UE 10# KB x 80 ft  Holds 10#s at chest height B UE 80 ft x 2   Wall chest push up 1 min x 2   Left GHJ ER at 45 degrees abd 1# 10 x 2  Manual:   Left GHJ PROM: Flex/abd - with gentle OP/scap stabilized  Left GHJ pos/inf mobs grade 2   Left scap ret/depression mobs  STM left bicep, delt, UT/LS  Left arm pull MFR    OP EDUCATION:  Access Code: [email or web address]  URL: [email or web address]  Date: 01/10/2024  Prepared by: Krunal Bazan    Exercises  - Seated Overhead Press with Dumbbells  - 1 x daily - 3-4 x weekly - 2-3 sets - 10-20 reps    Assessment:  Pt is tolerated there ex progressions  well.  Able to complete strengthening with increase range and no lasting increase in sx.  Overall making good progress.     Plan:  Continue with PT x 1 a week for 2-3 weeks   F/U with resisted overhead press  Progress AROM as tolerated   Slowly porgress periscap strengthening and RTC strengthening.   OP PT Plan  Number of Treatments Authorized: 8/MN, copay = $20, 100% coverage    Krunal Bazan, PT

## 2024-01-11 DIAGNOSIS — I10 ESSENTIAL (PRIMARY) HYPERTENSION: ICD-10-CM

## 2024-01-12 DIAGNOSIS — I10 HYPERTENSION: Primary | ICD-10-CM

## 2024-01-12 RX ORDER — METOPROLOL SUCCINATE 100 MG/1
100 TABLET, EXTENDED RELEASE ORAL EVERY MORNING
Qty: 90 TABLET | Refills: 3 | Status: SHIPPED | OUTPATIENT
Start: 2024-01-12 | End: 2024-01-15 | Stop reason: SDUPTHER

## 2024-01-15 RX ORDER — METOPROLOL SUCCINATE 100 MG/1
100 TABLET, EXTENDED RELEASE ORAL DAILY
Qty: 90 TABLET | Refills: 2 | Status: SHIPPED | OUTPATIENT
Start: 2024-01-15

## 2024-01-17 ENCOUNTER — TREATMENT (OUTPATIENT)
Dept: PHYSICAL THERAPY | Facility: CLINIC | Age: 74
End: 2024-01-17
Payer: COMMERCIAL

## 2024-01-17 DIAGNOSIS — M25.512 LEFT SHOULDER PAIN: ICD-10-CM

## 2024-01-17 PROCEDURE — 97140 MANUAL THERAPY 1/> REGIONS: CPT | Mod: GP

## 2024-01-17 PROCEDURE — 97110 THERAPEUTIC EXERCISES: CPT | Mod: GP

## 2024-01-17 NOTE — PROGRESS NOTES
Physical Therapy  Physical Therapy RECHECK/Treatment Note    Patient Name: Luis Manuel Avitia  MRN: [id number]  Today's Date: 1/17/2024  Time Calculation  Start Time: 1249  Stop Time: 1335  Time Calculation (min): 46 min    Insurance:  Number of Treatments Authorized: 8/[place], copay = $20, 100% coverage        Current Problem  1. Left shoulder pain  Follow Up In Physical Therapy          Precautions  Precautions  Precautions Comment: DOS: 11/13/23 Total GHJ replacement, Next POV is 1/3/24. No IR past glute/no active IR, No ER > 30 degrees. Pt did not bring protocol. (Next POV is in late Feb/Early March 2024)    Subjective   General   Patient reports: No big changes over the past week. Felt good after the last session, just fatigued. Approx. 8 weeks post op.    Performing HEP?: partially     Pain   0/10 Left GHJ     Objective     Treatments:  Therex:   Scifit UE L2 quickstart CW/CCW  x 3 min each direction   5# KB overhead press to chest press in standing 1 min x 2   Left bicep curl 5# KB 1 min x 2  Left ER yellow band within precautions 10 x 3   Wall chest push up 1 min x 2   Left GHJ abd yellow band 1 min x 2   Matrix: Left GHJ IR iso walkout 2.5#s 1 min x 2   Left UE Peg Board x 4 min   Manual:   Left GHJ PROM: Flex/abd - with gentle OP/scap stabilized  Left GHJ pos/inf mobs grade 2   Left scap ret/depression mobs  STM left bicep, delt, UT/LS  Left arm pull MFR    OP EDUCATION:  Access Code: [email or web address]. URL: [email or web address] Date: 01/10/2024. Prepared by: Krunal Bazan  Exercises- Seated Overhead Press with Dumbbells  - 1 x daily - 3-4 x weekly - 2-3 sets - 10-20 reps    Assessment:  Pt has good tolerance to strengthening progressions, challenged by PEG board secondary to fatigue .  Overall progressing well with function/pain per subjective report.      Plan:  Progress AROM as tolerated   Slowly porgress periscap strengthening and RTC strengthening.   OP PT Plan  Number of Treatments  Authorized: 8/[place], copay = $20, 100% coverage    Krunal Bazan, PT

## 2024-01-21 DIAGNOSIS — E22.2 SYNDROME OF INAPPROPRIATE SECRETION OF ANTIDIURETIC HORMONE (MULTI): ICD-10-CM

## 2024-01-22 RX ORDER — SODIUM CHLORIDE 1 G/1
1 TABLET ORAL 2 TIMES DAILY
Qty: 60 TABLET | Refills: 3 | Status: SHIPPED | OUTPATIENT
Start: 2024-01-22

## 2024-01-23 ENCOUNTER — OFFICE VISIT (OUTPATIENT)
Dept: PRIMARY CARE | Facility: CLINIC | Age: 74
End: 2024-01-23
Payer: COMMERCIAL

## 2024-01-23 ENCOUNTER — APPOINTMENT (OUTPATIENT)
Dept: PHYSICAL THERAPY | Facility: CLINIC | Age: 74
End: 2024-01-23
Payer: COMMERCIAL

## 2024-01-23 VITALS
OXYGEN SATURATION: 97 % | HEIGHT: 68 IN | BODY MASS INDEX: 31.89 KG/M2 | SYSTOLIC BLOOD PRESSURE: 100 MMHG | HEART RATE: 68 BPM | WEIGHT: 210.4 LBS | DIASTOLIC BLOOD PRESSURE: 64 MMHG | TEMPERATURE: 97.9 F

## 2024-01-23 DIAGNOSIS — H60.501 ACUTE OTITIS EXTERNA OF RIGHT EAR, UNSPECIFIED TYPE: Primary | ICD-10-CM

## 2024-01-23 PROBLEM — M25.512 PAIN OF LEFT SHOULDER REGION: Status: ACTIVE | Noted: 2023-04-19

## 2024-01-23 PROBLEM — J33.8 POLYP OF PARANASAL SINUS: Status: ACTIVE | Noted: 2023-08-25

## 2024-01-23 PROBLEM — N40.0 BENIGN PROSTATIC HYPERPLASIA: Status: ACTIVE | Noted: 2023-01-04

## 2024-01-23 PROBLEM — R52 PAIN: Status: ACTIVE | Noted: 2024-01-23

## 2024-01-23 PROBLEM — Z96.659 ARTIFICIAL KNEE JOINT PRESENT: Status: ACTIVE | Noted: 2022-08-11

## 2024-01-23 PROBLEM — E22.2 SYNDROME OF INAPPROPRIATE SECRETION OF ANTIDIURETIC HORMONE (MULTI): Status: ACTIVE | Noted: 2023-11-11

## 2024-01-23 PROBLEM — M54.50 LOW BACK PAIN: Status: ACTIVE | Noted: 2020-02-17

## 2024-01-23 PROBLEM — M25.559 ARTHRALGIA OF HIP: Status: ACTIVE | Noted: 2024-01-23

## 2024-01-23 PROBLEM — M25.511 PAIN OF RIGHT SHOULDER REGION: Status: ACTIVE | Noted: 2023-04-17

## 2024-01-23 PROBLEM — M79.601 CHRONIC PAIN OF RIGHT UPPER EXTREMITY: Status: ACTIVE | Noted: 2021-02-25

## 2024-01-23 PROBLEM — M75.40 IMPINGEMENT SYNDROME OF SHOULDER REGION: Status: ACTIVE | Noted: 2024-01-23

## 2024-01-23 PROBLEM — E87.1 HYPONATREMIA: Status: ACTIVE | Noted: 2023-01-04

## 2024-01-23 PROBLEM — G89.29 CHRONIC PAIN OF RIGHT UPPER EXTREMITY: Status: ACTIVE | Noted: 2021-02-25

## 2024-01-23 PROBLEM — M75.20 BICEPS TENDINITIS: Status: ACTIVE | Noted: 2024-01-23

## 2024-01-23 PROBLEM — R53.83 FATIGUE: Status: ACTIVE | Noted: 2023-10-13

## 2024-01-23 PROBLEM — Z86.16 PERSONAL HISTORY OF COVID-19: Status: ACTIVE | Noted: 2023-01-24

## 2024-01-23 PROBLEM — U07.1 DISEASE DUE TO SEVERE ACUTE RESPIRATORY SYNDROME CORONAVIRUS 2 (SARS-COV-2): Status: ACTIVE | Noted: 2024-01-23

## 2024-01-23 PROBLEM — Z86.79 HISTORY OF HYPERTENSION: Status: ACTIVE | Noted: 2024-01-23

## 2024-01-23 PROBLEM — F19.21 HISTORY OF SUBSTANCE DEPENDENCE (MULTI): Status: ACTIVE | Noted: 2022-08-11

## 2024-01-23 PROBLEM — G56.01 CARPAL TUNNEL SYNDROME OF RIGHT WRIST: Status: ACTIVE | Noted: 2023-08-25

## 2024-01-23 PROBLEM — E66.9 OBESITY WITH BODY MASS INDEX 30 OR GREATER: Status: ACTIVE | Noted: 2023-08-25

## 2024-01-23 PROBLEM — M25.569 KNEE PAIN: Status: ACTIVE | Noted: 2023-08-25

## 2024-01-23 PROBLEM — H90.3 SENSORINEURAL HEARING LOSS (SNHL) OF BOTH EARS: Status: ACTIVE | Noted: 2023-08-25

## 2024-01-23 PROBLEM — M51.26 HERNIATED LUMBAR INTERVERTEBRAL DISC: Status: ACTIVE | Noted: 2023-01-24

## 2024-01-23 PROBLEM — M25.552 PAIN OF LEFT HIP JOINT: Status: ACTIVE | Noted: 2023-01-23

## 2024-01-23 PROBLEM — K59.00 CONSTIPATION: Status: ACTIVE | Noted: 2024-01-23

## 2024-01-23 PROBLEM — Z98.890 HISTORY OF SHOULDER SURGERY: Status: ACTIVE | Noted: 2024-01-23

## 2024-01-23 PROBLEM — Z96.649 PRESENCE OF HIP JOINT PROSTHESIS: Status: ACTIVE | Noted: 2022-08-11

## 2024-01-23 PROBLEM — R33.9 RETENTION OF URINE: Status: ACTIVE | Noted: 2022-08-11

## 2024-01-23 PROBLEM — M16.12 OSTEOARTHRITIS OF LEFT HIP: Status: ACTIVE | Noted: 2024-01-23

## 2024-01-23 PROBLEM — G47.33 OBSTRUCTIVE SLEEP APNEA SYNDROME IN ADULT: Status: ACTIVE | Noted: 2023-01-04

## 2024-01-23 PROBLEM — R07.89 ATYPICAL CHEST PAIN: Status: ACTIVE | Noted: 2022-11-28

## 2024-01-23 PROBLEM — M16.9 OSTEOARTHRITIS OF HIP: Status: ACTIVE | Noted: 2021-02-25

## 2024-01-23 PROBLEM — G89.18 ACUTE POSTOPERATIVE PAIN: Status: ACTIVE | Noted: 2024-01-23

## 2024-01-23 PROCEDURE — 1125F AMNT PAIN NOTED PAIN PRSNT: CPT | Performed by: FAMILY MEDICINE

## 2024-01-23 PROCEDURE — 1036F TOBACCO NON-USER: CPT | Performed by: FAMILY MEDICINE

## 2024-01-23 PROCEDURE — 3074F SYST BP LT 130 MM HG: CPT | Performed by: FAMILY MEDICINE

## 2024-01-23 PROCEDURE — 1159F MED LIST DOCD IN RCRD: CPT | Performed by: FAMILY MEDICINE

## 2024-01-23 PROCEDURE — 99213 OFFICE O/P EST LOW 20 MIN: CPT | Performed by: FAMILY MEDICINE

## 2024-01-23 PROCEDURE — 3078F DIAST BP <80 MM HG: CPT | Performed by: FAMILY MEDICINE

## 2024-01-23 RX ORDER — AMMONIUM LACTATE 12 G/100G
CREAM TOPICAL
COMMUNITY
Start: 2020-11-25

## 2024-01-23 RX ORDER — SILDENAFIL 50 MG/1
50 TABLET, FILM COATED ORAL DAILY PRN
COMMUNITY
Start: 2023-08-24

## 2024-01-23 RX ORDER — PANTOPRAZOLE SODIUM 20 MG/1
TABLET, DELAYED RELEASE ORAL
COMMUNITY
Start: 2020-09-29

## 2024-01-23 RX ORDER — CIPROFLOXACIN AND DEXAMETHASONE 3; 1 MG/ML; MG/ML
4 SUSPENSION/ DROPS AURICULAR (OTIC) 2 TIMES DAILY
Qty: 7.5 ML | Refills: 0 | Status: SHIPPED | OUTPATIENT
Start: 2024-01-23 | End: 2024-04-24 | Stop reason: ALTCHOICE

## 2024-01-23 ASSESSMENT — PATIENT HEALTH QUESTIONNAIRE - PHQ9
1. LITTLE INTEREST OR PLEASURE IN DOING THINGS: NOT AT ALL
2. FEELING DOWN, DEPRESSED OR HOPELESS: NOT AT ALL
SUM OF ALL RESPONSES TO PHQ9 QUESTIONS 1 AND 2: 0

## 2024-01-23 ASSESSMENT — PAIN SCALES - GENERAL: PAINLEVEL: 4

## 2024-01-23 NOTE — ASSESSMENT & PLAN NOTE
Exam of right ear concerning for acute otitis externa.  Will give patient prescription for Ciprodex.

## 2024-01-23 NOTE — PROGRESS NOTES
"Subjective   Patient ID: Luis Manuel vAitia is a 73 y.o. male who presents for rt ear  (Rt ear feels like plugged no wax when I looked  he stated it feels like water in it).    Patient reports that on Sunday he was taking a shower and got water in his ear.  Since then he has been having pain, dizziness, headaches, lightheadedness from \"water in his ear.\"  Patient reports it has also affected his hearing.  Patient has tried taking Tylenol which moderately helps with the headaches.    Denies new onset headaches, fever, chills, n/v/d, chest pain, SOB, abdominal pain, urinary symptoms, and lower extremity edema.     Review of Systems  All other systems have been reviewed and are negative.    Visit Vitals  /64   Pulse 68   Temp 36.6 °C (97.9 °F)   Ht 1.727 m (5' 8\")   Wt 95.4 kg (210 lb 6.4 oz)   SpO2 97%   BMI 31.99 kg/m²   Smoking Status Former   BSA 2.14 m²       Objective   Physical Exam  General: Alert and oriented. Appears well-nourished and in no acute distress.  Eyes: PERRLA. EOMI.  Head/neck: Normocephalic. Supple.  Ears: Mild erythema in the right ear canal.  Mild area of erythema along the right TM.  Normal canal and TM in the left.  Respiratory/Thorax: Clear to auscultation bilaterally. No wheezing.   Cardiovascular: Regular rate and rhythm. No murmurs.  Psychological: Appropriate mood and affect.     Assessment/Plan   Patient is a 73-year-old male who presents for right ear pain for the last 2 days. No red flags.     Problem List Items Addressed This Visit       Acute otitis externa of right ear - Primary     Exam of right ear concerning for acute otitis externa.  Will give patient prescription for Ciprodex.          Relevant Medications    ciprofloxacin-dexamethasone (CiproDEX) otic suspension   -Follow-up if symptoms do not improve.    I have personally reviewed all available pertinent labs, imaging, and consult notes with the patient. All questions and concerns were addressed. Patient verbalizes " understanding instructions and agrees with established plan of care.     Patient was seen  and discussed with attending physician Dr. Gar.    Ananya Cedillo, DO  Family Medicine, PGY-2

## 2024-01-23 NOTE — PROGRESS NOTES
I reviewed and examined the patient. I was present for the key exam elements, and I fully participated in the patient's care. I discussed the management of the care with the resident. I have personally reviewed the pertinent labs and imaging, as well as recent notes, with the patient. I have reviewed the note above and agree with the resident's medical decision making as documented in the resident's note, in addition to the following comments / findings:     Agree with the rest of the plan outlined below by resident physician. No red flags.      The patient understands and agrees to the assessment and plan of care. Patient has also agreed to follow up and comply with the treatment and evaluation as recommended today. Patient was instructed to call the office at [phone] should questions arise regarding their treatment or care.     Omer Gar DO, FAOASM  Family Medicine   [address], [address]  [place] [address]   Omer Gar DO

## 2024-01-29 ENCOUNTER — APPOINTMENT (OUTPATIENT)
Dept: ORTHOPEDIC SURGERY | Facility: CLINIC | Age: 74
End: 2024-01-29
Payer: COMMERCIAL

## 2024-01-29 ENCOUNTER — OFFICE VISIT (OUTPATIENT)
Dept: ORTHOPEDIC SURGERY | Facility: CLINIC | Age: 74
End: 2024-01-29
Payer: COMMERCIAL

## 2024-01-29 VITALS — HEIGHT: 68 IN | WEIGHT: 210 LBS | BODY MASS INDEX: 31.83 KG/M2

## 2024-01-29 DIAGNOSIS — G56.01 CARPAL TUNNEL SYNDROME OF RIGHT WRIST: Primary | ICD-10-CM

## 2024-01-29 PROCEDURE — 99213 OFFICE O/P EST LOW 20 MIN: CPT | Performed by: PHYSICIAN ASSISTANT

## 2024-01-29 PROCEDURE — 1036F TOBACCO NON-USER: CPT | Performed by: PHYSICIAN ASSISTANT

## 2024-01-29 PROCEDURE — 20526 THER INJECTION CARP TUNNEL: CPT | Performed by: PHYSICIAN ASSISTANT

## 2024-01-29 PROCEDURE — 1125F AMNT PAIN NOTED PAIN PRSNT: CPT | Performed by: PHYSICIAN ASSISTANT

## 2024-01-29 PROCEDURE — 1159F MED LIST DOCD IN RCRD: CPT | Performed by: PHYSICIAN ASSISTANT

## 2024-01-29 RX ORDER — TRIAMCINOLONE ACETONIDE 40 MG/ML
20 INJECTION, SUSPENSION INTRA-ARTICULAR; INTRAMUSCULAR
Status: COMPLETED | OUTPATIENT
Start: 2024-01-29 | End: 2024-01-29

## 2024-01-29 RX ORDER — LIDOCAINE HYDROCHLORIDE 10 MG/ML
0.5 INJECTION INFILTRATION; PERINEURAL
Status: COMPLETED | OUTPATIENT
Start: 2024-01-29 | End: 2024-01-29

## 2024-01-29 RX ADMIN — LIDOCAINE HYDROCHLORIDE 0.5 ML: 10 INJECTION INFILTRATION; PERINEURAL at 14:57

## 2024-01-29 RX ADMIN — TRIAMCINOLONE ACETONIDE 20 MG: 40 INJECTION, SUSPENSION INTRA-ARTICULAR; INTRAMUSCULAR at 14:57

## 2024-01-29 ASSESSMENT — PAIN - FUNCTIONAL ASSESSMENT: PAIN_FUNCTIONAL_ASSESSMENT: 0-10

## 2024-01-29 ASSESSMENT — PAIN DESCRIPTION - DESCRIPTORS: DESCRIPTORS: ACHING

## 2024-01-29 ASSESSMENT — PAIN SCALES - GENERAL: PAINLEVEL_OUTOF10: 5 - MODERATE PAIN

## 2024-01-29 NOTE — PROGRESS NOTES
Patient returns clinic today for recurrence right carpal tunnel syndrome.  Has received intermittent steroid injections and done well with them.  Currently recovering from shoulder replacement and doing very well in regards to this.  He is requesting a new injection today.    Patient's self reported past medical history, medications, allergies, surgical history, family and social history as well as a 10 point review of systems has been documented in the new patient intake form and scanned into the patient's electronic medical record. Pertinent findings are documented in the HPI.    Physical Examination Findings:  Constitutional: Appears well-developed and well-nourished.  Head: Normocephalic and atraumatic.  Eyes: Pupils are equal and round.  Cardiovascular: Intact distal pulses.   Respiratory: Effort normal. No respiratory distress.  Neurologic: Alert and oriented to person, place, and time.  Skin: Skin is warm and dry.  Hematologic / Lymphatic: No lymphedema, lymphangitis.  Psychiatric: normal mood and affect. Behavior is normal.   Musculoskeletal: Right hand without any significant thenar intrinsic muscle atrophy.  Full digital range of motion.  Abnormal subjective sensation in the median nerve distribution positive Germaine median nerve compression test.    Impression: Right carpal tunnel syndrome    Plan: Repeat steroid injection was performed today and tolerated well.  At this time he is not ready for any surgical intervention.  He will continue to monitor symptoms and return to our office as needed.    Patient ID: Luis Manuel Avitia is a 73 y.o. male.    Hand / UE Inj/Asp: R carpal tunnel for carpal tunnel syndrome on 1/29/2024 2:57 PM  Indications: therapeutic  Details: 25 G needle  Medications: 20 mg triamcinolone acetonide 40 mg/mL; 0.5 mL lidocaine 10 mg/mL (1 %)  Procedure, treatment alternatives, risks and benefits explained, specific risks discussed. Immediately prior to procedure a time out was called to  verify the correct patient, procedure, equipment, support staff and site/side marked as required.           Edna Nam PA-C  Department of Orthopaedic Surgery  [facility]    Dictation performed with the use of voice recognition software. Syntax and grammatical errors may exist.

## 2024-01-30 ENCOUNTER — TREATMENT (OUTPATIENT)
Dept: PHYSICAL THERAPY | Facility: CLINIC | Age: 74
End: 2024-01-30
Payer: COMMERCIAL

## 2024-01-30 ENCOUNTER — OFFICE VISIT (OUTPATIENT)
Dept: OTOLARYNGOLOGY | Facility: CLINIC | Age: 74
End: 2024-01-30
Payer: COMMERCIAL

## 2024-01-30 VITALS — HEIGHT: 68 IN | WEIGHT: 211 LBS | TEMPERATURE: 96.8 F | BODY MASS INDEX: 31.98 KG/M2

## 2024-01-30 DIAGNOSIS — H93.11 TINNITUS OF RIGHT EAR: ICD-10-CM

## 2024-01-30 DIAGNOSIS — R06.83 SNORING: ICD-10-CM

## 2024-01-30 DIAGNOSIS — G47.33 OBSTRUCTIVE SLEEP APNEA: Primary | ICD-10-CM

## 2024-01-30 DIAGNOSIS — M25.512 LEFT SHOULDER PAIN: ICD-10-CM

## 2024-01-30 DIAGNOSIS — H91.8X3 OTHER SPECIFIED HEARING LOSS, BILATERAL: ICD-10-CM

## 2024-01-30 PROCEDURE — 1159F MED LIST DOCD IN RCRD: CPT | Performed by: OTOLARYNGOLOGY

## 2024-01-30 PROCEDURE — 1036F TOBACCO NON-USER: CPT | Performed by: OTOLARYNGOLOGY

## 2024-01-30 PROCEDURE — 99214 OFFICE O/P EST MOD 30 MIN: CPT | Performed by: OTOLARYNGOLOGY

## 2024-01-30 PROCEDURE — 1125F AMNT PAIN NOTED PAIN PRSNT: CPT | Performed by: OTOLARYNGOLOGY

## 2024-01-30 PROCEDURE — 97110 THERAPEUTIC EXERCISES: CPT | Mod: GP

## 2024-01-30 ASSESSMENT — PATIENT HEALTH QUESTIONNAIRE - PHQ9
1. LITTLE INTEREST OR PLEASURE IN DOING THINGS: NOT AT ALL
SUM OF ALL RESPONSES TO PHQ9 QUESTIONS 1 & 2: 0
2. FEELING DOWN, DEPRESSED OR HOPELESS: NOT AT ALL

## 2024-01-30 NOTE — PROGRESS NOTES
Chief Complaint   Patient presents with    Follow-up     Follow up   pcp put him on ear drops, feels like something in the right ear,  decrease hearing , on and off pain,   using cpap every night working well for him     HPI:  Luis Manuel Avitia is a 73 y.o. male who has a 9-day history of acute right-sided hearing loss with some tinnitus some plugged sensation and some pain.  Pain is actually gotten much better and is almost resolved.  Occasional off-balance as well.  He did see his PCP who placed him on some eardrops.  H/O obstructive sleep apnea which was diagnosed in 2017 with an AHI of 89. He continues to use CPAP at 10 cm of water with great compliance.   He uses it every night for at least 7 to 8 hours a night.  100% of nights.  He does continue to notice good daily clinical benefit.    PMH:  Past Medical History:   Diagnosis Date    CAD (coronary artery disease)     status post CABG x2  with LIMA to LAD and saphenous vein graft to obtuse marginal branch of the LCx    Chronic hyponatremia     followed by nephrology, managed on meds; 9/7/23 [id number]=[id number]    Encounter for other preprocedural examination 12/08/2020    Preoperative clearance    HLD (hyperlipidemia)     HTN (hypertension)     Lumbar herniated disc     OA (osteoarthritis)     JOHNATHAN on CPAP     Other intervertebral disc displacement, lumbar region 10/30/2020    Lumbar disc herniation    Personal history of other diseases of the circulatory system     History of hypertension    Personal history of other diseases of the nervous system and sense organs     History of obstructive sleep apnea    SIADH (syndrome of inappropriate ADH production) (CMS/[facility])      Past Surgical History:   Procedure Laterality Date    CARDIAC CATHETERIZATION  2022    KNEE SURGERY Right 05/11/2016    Knee Surgery    OTHER SURGICAL HISTORY  09/23/2022    Coronary artery bypass graft    OTHER SURGICAL HISTORY Left 12/17/2021    Hip surgery    SHOULDER SURGERY Right     TSR         Medications:  "    Current Outpatient Medications:     ammonium lactate (Amlactin) 12 % cream, Ammonium Lactate 12 % External Cream APPLY TWICE A DAY Quantity: 385 Refills: 0 Ordered: 25-Nov-2020 DO Start : 25-Nov-2020 Active, Disp: , Rfl:     aspirin (Adult Low Dose Aspirin) 81 mg EC tablet, Take 1 tablet (81 mg) by mouth once daily. As directed, Disp: , Rfl:     atorvastatin (Lipitor) 80 mg tablet, TAKE 1 TABLET BY MOUTH EVERY DAY, Disp: 30 tablet, Rfl: 12    fluticasone propionate (Xhance) 93 mcg/actuation aerosol breath activated, Administer 1 puff into affected nostril(s) 2 times a day as needed., Disp: , Rfl:     metoprolol succinate XL (Toprol-XL) 100 mg 24 hr tablet, Take 1 tablet daily, Disp: 90 tablet, Rfl: 2    multivitamin (MULTIPLE VITAMINS ORAL), Take 1 tablet by mouth once daily., Disp: , Rfl:     sildenafil (Viagra) 50 mg tablet, Take 1 tablet (50 mg) by mouth once daily as needed., Disp: , Rfl:     sodium chloride 1,000 mg tablet, Take 1 tablet twice daily, Disp: 60 tablet, Rfl: 3    Ure-Na 15 gram powder in packet oral powder, once daily., Disp: , Rfl:     ciprofloxacin-dexamethasone (CiproDEX) otic suspension, Administer 4 drops into the right ear 2 times a day. (Patient not taking: Reported on 1/30/2024), Disp: 7.5 mL, Rfl: 0    mometasone (Elocon) 0.1 % cream, Apply topically once daily for 21 days., Disp: 21 g, Rfl: 0    pantoprazole (ProtoNix) 20 mg EC tablet, Take by mouth., Disp: , Rfl:   No current facility-administered medications for this visit.     Allergies:  No Known Allergies     ROS:  Review of systems normal unless stated otherwise in the HPI and/or PMH.    Physical Exam:  Temperature 36 °C (96.8 °F), height 1.727 m (5' 8\"), weight 95.7 kg (211 lb). Body mass index is 32.08 kg/m².     GENERAL APPEARANCE: Well developed and well nourished.  Alert and oriented in no acute distress.  Normal vocal quality.      HEAD/FACE: No erythema or edema or facial tenderness.  Normal facial nerve function " bilaterally.    EAR:       EXTERNAL: Normal pinnas and external auditory canals without lesion or obstructing wax.       MIDDLE EAR: Tympanic membranes intact and mobile with normal landmarks.  Middle ear space appears well aerated.  Microscopic evaluation performed.       TUBE STATUS: N/A       MASTOID CAVITY: N/A       HEARING: Gross hearing assessment is within normal limits.  Tuning fork exam is equivocal bilaterally with Walker.  Millicent is air greater than bone bilaterally.  This is both with 512 and 256.      NOSE:       VISUALIZED USING: Anterior rhinoscopy with headlight and nasal speculum.       DORSUM: Midline, nontraumatic appearance.       MUCOSA: Normal-appearing.       SECRETIONS: Normal.       SEPTUM: Midline and nonobstructing.       INFERIOR TURBINATES: Normal.       MIDDLE TURBINATES/MEATUS: N/A       BLEEDING: N/A         ORAL CAVITY/PHARYNX:       TEETH: Adequate dentition.       TONGUE: No mass or lesion.  Normal mobility.       FLOOR OF MOUTH: No mass or lesion.       PALATE: Normal hard palate, soft palate, and uvula.       OROPHARYNX: Normal without mass or lesion.       BUCCAL MUCOSA/GBS: Normal without mass or lesion.       LIPS: Normal.    LARYNX/HYPOPHARYNX/NASOPHARYNX: N/A    NECK: No palpable masses or abnormal adenopathy.  Trachea is midline.    THYROID: No thyromegaly or palpable nodule.    SALIVARY GLANDS: Normal bilateral parotid and submandibular glands by inspection and palpation.    TMJ's: Normal.    NEURO: Cranial nerve exam grossly normal bilaterally.       Assessment/Plan   Luis Manuel was seen today for follow-up.  Diagnoses and all orders for this visit:  Obstructive sleep apnea (Primary)  Snoring  Other specified hearing loss, bilateral  Tinnitus of right ear     Normal exam.  I cannot account for symptoms on examination so I do worry that he may have a right-sided sudden sensorineural hearing loss.  Will work on getting him an audiogram within the next few days and follow-up as  well.  Continue CPAP.  Has excellent compliance  Follow up for audiogram.     Shivam Malloy MD

## 2024-01-30 NOTE — PROGRESS NOTES
Physical Therapy  Physical Therapy Treatment Note    Patient Name: Luis Manuel Avitia  MRN: [id number]  Today's Date: 1/30/2024  Time Calculation  Start Time: 1137  Stop Time: 1225  Time Calculation (min): 48 min    Insurance:  Number of Treatments Authorized: 10/12, copay = $20, 100% coverage        Current Problem  1. Left shoulder pain  Follow Up In Physical Therapy          Precautions  Precautions  Precautions Comment: DOS: 11/13/23 Total GHJ replacement, Next POV is 1/3/24. No IR past glute/no active IR, No ER > 30 degrees. Pt did not bring protocol. (Next POV is in late Feb/Early March 2024)    Subjective   General   Patient reports: Right GHJ is bothering him a but today the left is good.  Nothing new to report with the left GHJ.  The left GHJ has continued to improved.  Approx. 10 weeks post op. Had his carpal tunnel act up in his R hand and got an injection for it yesterday. It is feeling better now. Next POV with Dr. Cadena is 3/13/24.      Performing HEP?: partially     Pain   0/10 Left GHJ     Objective     Treatments:  Therex:   Scifit UE L1 quickstart CW/CCW  x 3 min each direction   Left UE Peg Board flexion, abduction/scaption x 4 min each direction   Matrix:   Left GHJ IR 2.5#s 1 min x 2   Single arm press with SA punch 2.5#s 1 min x 2   Iso ER walk out 1 min x 2   L  curl 5#s 1 min x 2    L Tricep ext/push down 5#s 1 min x 2  Elbow on plinth at 90 degrees abd 1#s 1 min x 2   Wall chest push up 1 min x 2     Manual:   Left GHJ PROM: Flex/abd/ER/IR - with gentle OP/scap stabilized  Left GHJ pos/inf mobs grade 2 - 3  Left scap ret/depression mobs  Left arm pull MFR    OP EDUCATION:  Access Code: [email or web address]. URL: [email or web address] Date: 01/10/2024. Prepared by: Krunal Bazan  Exercises- Seated Overhead Press with Dumbbells  - 1 x daily - 3-4 x weekly - 2-3 sets - 10-20 reps    Assessment:  PT continues to make good progress , pain is decreasing with increasing function per  subjective report.  Able to complete progression to load/resistance with each exercise without pain and good technique.       Plan:  Progress AROM as tolerated   Slowly porgress periscap and RTC strengthening.   OP PT Plan  Number of Treatments Authorized: 10/12, copay = $20, 100% coverage    Krunal Bazan, PT

## 2024-01-31 ENCOUNTER — CLINICAL SUPPORT (OUTPATIENT)
Dept: AUDIOLOGY | Facility: CLINIC | Age: 74
End: 2024-01-31
Payer: COMMERCIAL

## 2024-01-31 ENCOUNTER — OFFICE VISIT (OUTPATIENT)
Dept: OTOLARYNGOLOGY | Facility: CLINIC | Age: 74
End: 2024-01-31
Payer: COMMERCIAL

## 2024-01-31 VITALS — BODY MASS INDEX: 31.98 KG/M2 | HEIGHT: 68 IN | TEMPERATURE: 96.8 F | WEIGHT: 211 LBS

## 2024-01-31 DIAGNOSIS — G47.33 OBSTRUCTIVE SLEEP APNEA: ICD-10-CM

## 2024-01-31 DIAGNOSIS — H93.8X1 PRESSURE SENSATION IN EAR, RIGHT: Primary | ICD-10-CM

## 2024-01-31 DIAGNOSIS — H93.291 ABNORMAL AUDITORY PERCEPTION OF RIGHT EAR: Primary | ICD-10-CM

## 2024-01-31 DIAGNOSIS — H90.3 BILATERAL SENSORINEURAL HEARING LOSS: ICD-10-CM

## 2024-01-31 DIAGNOSIS — H90.3 SENSORINEURAL HEARING LOSS (SNHL) OF BOTH EARS: ICD-10-CM

## 2024-01-31 PROCEDURE — 1160F RVW MEDS BY RX/DR IN RCRD: CPT | Performed by: OTOLARYNGOLOGY

## 2024-01-31 PROCEDURE — 1036F TOBACCO NON-USER: CPT | Performed by: OTOLARYNGOLOGY

## 2024-01-31 PROCEDURE — 92567 TYMPANOMETRY: CPT | Performed by: AUDIOLOGIST

## 2024-01-31 PROCEDURE — 99213 OFFICE O/P EST LOW 20 MIN: CPT | Performed by: OTOLARYNGOLOGY

## 2024-01-31 PROCEDURE — 1125F AMNT PAIN NOTED PAIN PRSNT: CPT | Performed by: OTOLARYNGOLOGY

## 2024-01-31 PROCEDURE — 92557 COMPREHENSIVE HEARING TEST: CPT | Performed by: AUDIOLOGIST

## 2024-01-31 PROCEDURE — 1159F MED LIST DOCD IN RCRD: CPT | Performed by: OTOLARYNGOLOGY

## 2024-01-31 NOTE — PROGRESS NOTES
AUDIOLOGY ADULT AUDIOMETRIC EVALUATION    Name:  Luis Manuel Avitia  :  1950  Age:  73 y.o.  Date of Evaluation:  2024    Reason for visit: Patient is seen in the clinic today at the request of otolaryngology for an audiologic evaluation.     HISTORY  Patient reports a plugged sensation with decreased hearing in his right ear.  He also noticed increased tinnitus.   He reports significant difficulty understanding others; has history of a moderate bilateral sensorineural hearing loss.      EVALUATION  See scanned audiogram: “Media” > “Audiology Report”.      RESULTS  Otoscopic Evaluation:  Right Ear: clear ear canal  Left Ear: clear ear canal    Immittance Measures:  Tympanometry:  Right Ear: Type A, normal tympanic membrane mobility with normal middle ear pressure]  Left Ear: Type A, normal tympanic membrane mobility with normal middle ear pressure    Acoustic Reflexes:  Ipsilateral Right Ear: no valid responses  Ipsilateral Left Ear: no valid responses  Contralateral Right Ear: did not evaluate  Contralateral Left Ear: did not evaluate    Audiometry:  Test Technique and Reliability:   Standard audiometry via supra-aural headphones. Reliability is good.    Pure tone air and bone conduction audiometry:  Mild sloping to severe sensorineural hearing loss 1KHz through 8KHz bilaterally    Speech Audiometry (Word Recognition Scores):   Right Ear: Excellent at most comfortable listening level of loudness (75dBHL)  Left Ear:  Good at most comfortable listening level of loudness (75dBHL)    IMPRESSIONS    Mild sloping to severe sensorineural hearing loss 1KHz and above bilaterally    RECOMMENDATIONS  - Follow up with otolaryngology today as scheduled.  - Annual audiologic evaluation, sooner if an acute change is noted.  - Hearing aid evaluation; patient reports he is waiting on new medical insurance for coverage     PATIENT EDUCATION  Discussed results, impressions and recommendations with the patient.  Questions were addressed and the patient was encouraged to contact our office should concerns arise.    Time for this encounter: 762/886

## 2024-01-31 NOTE — PROGRESS NOTES
Chief Complaint   Patient presents with    Follow-up     Audio and follow up     HPI:  Luis Manuel Avitia is a 73 y.o. male who has a 9-day history of acute right-sided hearing loss with some tinnitus some plugged sensation and some pain.  Pain is actually gotten much better and is almost resolved.  Occasional off-balance as well.  He did see his PCP who placed him on some eardrops.  I saw him yesterday and had a normal exam.  I did have some concern about sudden hearing loss and otologic disease on the right-hand side.  However his audiogram today is no change from previous.  He has bilateral sloping symmetric sensorineural hearing loss with type a tympanograms.  Discrimination scores are 92% on the right and 88% on the left.  Still has a pressure sensation in the ear.  H/O obstructive sleep apnea which was diagnosed in 2017 with an AHI of 89. He continues to use CPAP at 10 cm of water with great compliance.   He uses it every night for at least 7 to 8 hours a night.  100% of nights.  He does continue to notice good daily clinical benefit.    PMH:  Past Medical History:   Diagnosis Date    CAD (coronary artery disease)     status post CABG x2  with LIMA to LAD and saphenous vein graft to obtuse marginal branch of the LCx    Chronic hyponatremia     followed by nephrology, managed on meds; 9/7/23 WN=[id number]    Encounter for other preprocedural examination 12/08/2020    Preoperative clearance    HLD (hyperlipidemia)     HTN (hypertension)     Lumbar herniated disc     OA (osteoarthritis)     JOHNATHAN on CPAP     Other intervertebral disc displacement, lumbar region 10/30/2020    Lumbar disc herniation    Personal history of other diseases of the circulatory system     History of hypertension    Personal history of other diseases of the nervous system and sense organs     History of obstructive sleep apnea    SIADH (syndrome of inappropriate ADH production) (CMS/[facility])      Past Surgical History:   Procedure Laterality Date    CARDIAC  "CATHETERIZATION  2022    KNEE SURGERY Right 05/11/2016    Knee Surgery    OTHER SURGICAL HISTORY  09/23/2022    Coronary artery bypass graft    OTHER SURGICAL HISTORY Left 12/17/2021    Hip surgery    SHOULDER SURGERY Right     TSR         Medications:     Current Outpatient Medications:     ammonium lactate (Amlactin) 12 % cream, Ammonium Lactate 12 % External Cream APPLY TWICE A DAY Quantity: 385 Refills: 0 Ordered: 25-Nov-2020 DO Start : 25-Nov-2020 Active, Disp: , Rfl:     aspirin (Adult Low Dose Aspirin) 81 mg EC tablet, Take 1 tablet (81 mg) by mouth once daily. As directed, Disp: , Rfl:     atorvastatin (Lipitor) 80 mg tablet, TAKE 1 TABLET BY MOUTH EVERY DAY, Disp: 30 tablet, Rfl: 12    ciprofloxacin-dexamethasone (CiproDEX) otic suspension, Administer 4 drops into the right ear 2 times a day., Disp: 7.5 mL, Rfl: 0    fluticasone propionate (Xhance) 93 mcg/actuation aerosol breath activated, Administer 1 puff into affected nostril(s) 2 times a day as needed., Disp: , Rfl:     metoprolol succinate XL (Toprol-XL) 100 mg 24 hr tablet, Take 1 tablet daily, Disp: 90 tablet, Rfl: 2    multivitamin (MULTIPLE VITAMINS ORAL), Take 1 tablet by mouth once daily., Disp: , Rfl:     pantoprazole (ProtoNix) 20 mg EC tablet, Take by mouth., Disp: , Rfl:     sildenafil (Viagra) 50 mg tablet, Take 1 tablet (50 mg) by mouth once daily as needed., Disp: , Rfl:     sodium chloride 1,000 mg tablet, Take 1 tablet twice daily, Disp: 60 tablet, Rfl: 3    Ure-Na 15 gram powder in packet oral powder, once daily., Disp: , Rfl:     mometasone (Elocon) 0.1 % cream, Apply topically once daily for 21 days., Disp: 21 g, Rfl: 0     Allergies:  No Known Allergies     ROS:  Review of systems normal unless stated otherwise in the HPI and/or PMH.    Physical Exam:  Temperature 36 °C (96.8 °F), height 1.727 m (5' 8\"), weight 95.7 kg (211 lb). Body mass index is 32.08 kg/m².     GENERAL APPEARANCE: Well developed and well nourished.  Alert and " oriented in no acute distress.  Normal vocal quality.      HEAD/FACE: No erythema or edema or facial tenderness.  Normal facial nerve function bilaterally.    EAR:       EXTERNAL: Normal pinnas and external auditory canals without lesion or obstructing wax.       MIDDLE EAR: Tympanic membranes intact and mobile with normal landmarks.  Middle ear space appears well aerated.        TUBE STATUS: N/A       MASTOID CAVITY: N/A       HEARING: Gross hearing assessment is within normal limits.       NOSE:       VISUALIZED USING: Anterior rhinoscopy with headlight and nasal speculum.       DORSUM: Midline, nontraumatic appearance.       MUCOSA: Normal-appearing.       SECRETIONS: Normal.       SEPTUM: Midline and nonobstructing.       INFERIOR TURBINATES: Normal.       MIDDLE TURBINATES/MEATUS: N/A       BLEEDING: N/A         ORAL CAVITY/PHARYNX:       TEETH: Adequate dentition.       TONGUE: No mass or lesion.  Normal mobility.       FLOOR OF MOUTH: No mass or lesion.       PALATE: Normal hard palate, soft palate, and uvula.       OROPHARYNX: Normal without mass or lesion.       BUCCAL MUCOSA/GBS: Normal without mass or lesion.       LIPS: Normal.    LARYNX/HYPOPHARYNX/NASOPHARYNX: N/A    NECK: No palpable masses or abnormal adenopathy.  Trachea is midline.    THYROID: No thyromegaly or palpable nodule.    SALIVARY GLANDS: Normal bilateral parotid and submandibular glands by inspection and palpation.    TMJ's: Normal.    NEURO: Cranial nerve exam grossly normal bilaterally.       Assessment/Plan   Luis Manuel was seen today for follow-up.  Diagnoses and all orders for this visit:  Pressure sensation in ear, right (Primary)  Bilateral sensorineural hearing loss  Obstructive sleep apnea       Normal exam.  No change in audiogram from previous.  I think his symptoms are likely referred and not truly otologic in nature.  Recommend observation for now.  Be mindful of any musculoskeletal issues.  Try warm compress throughout the  day.  Follow up for Call with an update next week.     Shivam Malloy MD

## 2024-02-06 ENCOUNTER — TREATMENT (OUTPATIENT)
Dept: PHYSICAL THERAPY | Facility: CLINIC | Age: 74
End: 2024-02-06
Payer: COMMERCIAL

## 2024-02-06 DIAGNOSIS — M25.512 LEFT SHOULDER PAIN: ICD-10-CM

## 2024-02-06 DIAGNOSIS — E22.2 SIADH (SYNDROME OF INAPPROPRIATE ADH PRODUCTION) (MULTI): Primary | ICD-10-CM

## 2024-02-06 DIAGNOSIS — E22.2 SYNDROME OF INAPPROPRIATE SECRETION OF ANTIDIURETIC HORMONE (MULTI): ICD-10-CM

## 2024-02-06 PROCEDURE — 97110 THERAPEUTIC EXERCISES: CPT | Mod: GP

## 2024-02-06 PROCEDURE — 97140 MANUAL THERAPY 1/> REGIONS: CPT | Mod: GP

## 2024-02-06 RX ORDER — UREA 15 G
POWDER IN PACKET (EA) ORAL
Qty: 90 PACKET | Refills: 3 | Status: SHIPPED | OUTPATIENT
Start: 2024-02-06

## 2024-02-06 RX ORDER — UREA 15 G
POWDER IN PACKET (EA) ORAL
Qty: 90 PACKET | Refills: 6 | Status: SHIPPED | OUTPATIENT
Start: 2024-02-06

## 2024-02-06 NOTE — PROGRESS NOTES
Physical Therapy  Physical Therapy Treatment Note    Patient Name: Luis Manuel Avitia  MRN: [id number]  Today's Date: 2/6/2024  Time Calculation  Start Time: 1412  Stop Time: 1450  Time Calculation (min): 38 min    Insurance:  Number of Treatments Authorized: 11/12, copay = $20, 100% coverage        Current Problem  1. Left shoulder pain  Follow Up In Physical Therapy            Precautions  Precautions  Precautions Comment: DOS: 11/13/23 Total GHJ replacement, Next POV is 1/3/24. No IR past glute/no active IR, No ER > 30 degrees. Pt did not bring protocol. (Next POV is in late Feb/Early March 2024)    Subjective   General   Not much new to report. Are strength is still not there but is working on it. Feels confident he can continue to progress independently with HEP.     Performing HEP?: partially     Pain   0/10 Left GHJ     Objective     Treatments:  Therex:   Scifit UE no resistance CW/CCW  x 2 min each direction   Left GHJ flexion with 3#s 10 x 3  Left GHJ abd green band 10 x 2   Left GHJ ER yellow band 10 x 3  Leaning forward with support on chair left GHJ pos delt fly  10 x 3   Left GHJ press with green band 10 x 2   Left GHJ IR with green band 10 x 3    Left UE Peg Board flexion, abduction/scaption x 4 min each direction   Matrix:   Left GHJ IR 2.5#s 1 min x 2   Single arm press with SA punch 2.5#s 1 min x 2   Iso ER walk out 1 min x 2   L  curl 5#s 1 min x 2    L Tricep ext/push down 5#s 1 min x 2  Elbow on plinth at 90 degrees abd 1#s 1 min x 2   Wall chest push up 1 min x 2     Manual:   Left GHJ PROM: Flex/abd/ER/IR - with gentle OP/scap stabilized  Left GHJ pos/inf mobs grade 2 - 3  Left scap ret/depression mobs  Left arm pull MFR    OP EDUCATION:  Access Code: [email or web address]. URL: [email or web address]: 02/06/2024.Prepared by: Krunal Bazan    Exercises  - Standing Full Range Shoulder Flexion with Dumbbells  - 1 x daily - 3-4 x weekly - 2-4 sets - 10-20 reps  - Standing Single Arm  Shoulder Abduction - Palm Down  - 1 x daily - 3-4 x weekly - 2-4 sets - 10-20 reps  - Shoulder External Rotation and Scapular Retraction with Resistance  - 1 x daily - 3-4 x weekly - 2-4 sets - 10-20 reps  - Seated Single Arm Shoulder Press with Dumbbell  - 1 x daily - 3-4 x weekly - 2-4 sets - 10-20 reps  - Prone Single Arm Shoulder Horizontal Abduction with Dumbbell - Palm Down  - 1 x daily - 3-4 x weekly - 2-4 sets - 10-20 reps  - Shoulder Internal Rotation with Resistance  - 1 x daily - 3-4 x weekly - 2-4 sets - 10-20 reps    Assessment:  He was most challenged with abd and overhead press today.  C/o mostly of fatigue as limiting not pain. HEP was updated to emphasis strengthening and handout issued. Pt is in agreement with plan of care below.     Plan:  F/U with additions to HEP   X 1 more session in approx 2 weeks, likely discharge after.  OP PT Plan  Number of Treatments Authorized: 11/12, copay = $20, 100% coverage    Krunal Bazan, PT

## 2024-02-26 ENCOUNTER — TREATMENT (OUTPATIENT)
Dept: PHYSICAL THERAPY | Facility: CLINIC | Age: 74
End: 2024-02-26
Payer: COMMERCIAL

## 2024-02-26 DIAGNOSIS — M25.512 LEFT SHOULDER PAIN: Primary | ICD-10-CM

## 2024-02-26 PROCEDURE — 97110 THERAPEUTIC EXERCISES: CPT | Mod: GP

## 2024-02-26 NOTE — PROGRESS NOTES
Physical Therapy  Physical Therapy Treatment Note    Patient Name: Luis Manuel Avitia  MRN: [id number]  Today's Date: 2/26/2024  Time Calculation  Start Time: 1247  Stop Time: 1332  Time Calculation (min): 45 min    Insurance:  Number of Treatments Authorized: 12/12, copay = $20, 100% coverage        Current Problem  1. Left shoulder pain                Precautions  Precautions  Precautions Comment: DOS: 11/13/23 Total GHJ replacement, Next POV is 1/3/24. No IR past glute/no active IR, No ER > 30 degrees. Pt did not bring protocol. (Next POV is in late Feb/Early March 2024)    Subjective   General   Joined the [facility].  Lots of equipment, started on some light weights about 1 week ago.  Feeling stronger, arm strength is returning. Does not need to see the surgeon until  mi March for 6 month follow.     Performing HEP?: Yes transitioned to prior workout routine with min weight.       Pain   0/10 Left GHJ     Objective     Treatments:  Therex:   Scifit UE no resistance CW/CCW  x 3 min each direction   Body blade L GHJ 1 min each:  90 degrees felxion A/P  90 degrees flexion M/L   Arm at side dep/elevation  Arm at side abd/Add  Overhead flex/ext   Arm ar side ER/IR  90 degrees abd ER/IR   Matrix:  L Pos delt fly 2.5#s 10 x 2   Bent forward left GHJ flexion 2.5#s 10 x 2   B GHJ flexion with yellow band pull apart 10 x 2   Matrix   Left iso ER walkout 2.5#s 1 min x 2   B pec fly 5#s 1 min x 2     OP EDUCATION:  Access Code: [email or web address]. URL: [email or web address]: 02/06/2024.Prepared by: Krunal Bazan  Exercises  - Standing Full Range Shoulder Flexion with Dumbbells  - 1 x daily - 3-4 x weekly - 2-4 sets - 10-20 reps  - Standing Single Arm Shoulder Abduction - Palm Down  - 1 x daily - 3-4 x weekly - 2-4 sets - 10-20 reps  - Shoulder External Rotation and Scapular Retraction with Resistance  - 1 x daily - 3-4 x weekly - 2-4 sets - 10-20 reps  - Seated Single Arm Shoulder Press with Dumbbell   - 1 x daily - 3-4 x weekly - 2-4 sets - 10-20 reps  - Prone Single Arm Shoulder Horizontal Abduction with Dumbbell - Palm Down  - 1 x daily - 3-4 x weekly - 2-4 sets - 10-20 reps  - Shoulder Internal Rotation with Resistance  - 1 x daily - 3-4 x weekly - 2-4 sets - 10-20 reps    Assessment:  Pt is pleased with his progress since eval and has returned to previous workout/exercise routine without limitation from left GHJ pain.  Pt is in agreement with discharge to independent self management with HEP.      Plan:  Discharged from PT  OP PT Plan  Number of Treatments Authorized: 12/12, copay = $20, 100% coverage    Krunal Bazan, PT

## 2024-03-06 ENCOUNTER — APPOINTMENT (OUTPATIENT)
Dept: NEPHROLOGY | Facility: CLINIC | Age: 74
End: 2024-03-06
Payer: COMMERCIAL

## 2024-03-13 ENCOUNTER — APPOINTMENT (OUTPATIENT)
Dept: ORTHOPEDIC SURGERY | Facility: CLINIC | Age: 74
End: 2024-03-13
Payer: COMMERCIAL

## 2024-03-13 ENCOUNTER — APPOINTMENT (OUTPATIENT)
Dept: RADIOLOGY | Facility: CLINIC | Age: 74
End: 2024-03-13
Payer: COMMERCIAL

## 2024-03-20 ENCOUNTER — HOSPITAL ENCOUNTER (OUTPATIENT)
Dept: RADIOLOGY | Facility: CLINIC | Age: 74
Discharge: HOME | End: 2024-03-20
Payer: COMMERCIAL

## 2024-03-20 ENCOUNTER — OFFICE VISIT (OUTPATIENT)
Dept: ORTHOPEDIC SURGERY | Facility: CLINIC | Age: 74
End: 2024-03-20
Payer: COMMERCIAL

## 2024-03-20 DIAGNOSIS — Z96.612 AFTERCARE FOLLOWING LEFT SHOULDER JOINT REPLACEMENT SURGERY: Primary | ICD-10-CM

## 2024-03-20 DIAGNOSIS — Z47.1 AFTERCARE FOLLOWING LEFT SHOULDER JOINT REPLACEMENT SURGERY: ICD-10-CM

## 2024-03-20 DIAGNOSIS — Z96.612 AFTERCARE FOLLOWING LEFT SHOULDER JOINT REPLACEMENT SURGERY: ICD-10-CM

## 2024-03-20 DIAGNOSIS — Z47.1 AFTERCARE FOLLOWING LEFT SHOULDER JOINT REPLACEMENT SURGERY: Primary | ICD-10-CM

## 2024-03-20 PROCEDURE — 73030 X-RAY EXAM OF SHOULDER: CPT | Mod: LEFT SIDE | Performed by: RADIOLOGY

## 2024-03-20 PROCEDURE — 1036F TOBACCO NON-USER: CPT | Performed by: STUDENT IN AN ORGANIZED HEALTH CARE EDUCATION/TRAINING PROGRAM

## 2024-03-20 PROCEDURE — 99213 OFFICE O/P EST LOW 20 MIN: CPT | Performed by: STUDENT IN AN ORGANIZED HEALTH CARE EDUCATION/TRAINING PROGRAM

## 2024-03-20 PROCEDURE — 73030 X-RAY EXAM OF SHOULDER: CPT | Mod: LT

## 2024-03-20 PROCEDURE — 1160F RVW MEDS BY RX/DR IN RCRD: CPT | Performed by: STUDENT IN AN ORGANIZED HEALTH CARE EDUCATION/TRAINING PROGRAM

## 2024-03-20 PROCEDURE — 1159F MED LIST DOCD IN RCRD: CPT | Performed by: STUDENT IN AN ORGANIZED HEALTH CARE EDUCATION/TRAINING PROGRAM

## 2024-03-20 NOTE — PROGRESS NOTES
History: Patient returns to the office status post L TSA on 4 months ago. Patient has been  doing home exercises and pain is well controlled. Denies numbness/tingling.     Past medical history, past surgical history, medications, allergies, family history, social history, and review of systems were reviewed today and have been documented separately in this encounter.   A 12 point review of systems was negative other than as stated in the HPI.    Physical Examination:    On exam of the left upper extremity, incisions are well healed, without significant erythema or drainage. Demonstrates full ROM of the elbow/wrist/hand. Neurovascularly intact throughout. AROM of the shoulder today to , ER 40, IR T12.     Imaging: Xrs of the operative shoulder were performed today. Implants in stable alignment. No acute complications noted.     Assessment: 4 months s/p L TSA     Plan: Patient progressing as expected after recent surgery. He is moving his shoulders well and is pain free. Discussed that he has up to a year to make gains with the shoulder. All questions answered.     Dragon software was used to dictate this note, please be aware that minor errors in transcription may be present.    Dario Cadena MD    Shoulder/Elbow Surgery  [facility]

## 2024-03-26 DIAGNOSIS — E22.2 SIADH (SYNDROME OF INAPPROPRIATE ADH PRODUCTION) (MULTI): Primary | ICD-10-CM

## 2024-03-27 ENCOUNTER — LAB (OUTPATIENT)
Dept: LAB | Facility: LAB | Age: 74
End: 2024-03-27
Payer: COMMERCIAL

## 2024-03-27 DIAGNOSIS — E22.2 SIADH (SYNDROME OF INAPPROPRIATE ADH PRODUCTION) (MULTI): ICD-10-CM

## 2024-03-27 LAB
ALBUMIN SERPL BCP-MCNC: 3.9 G/DL (ref 3.4–5)
ANION GAP SERPL CALC-SCNC: 11 MMOL/L (ref 10–20)
BUN SERPL-MCNC: 15 MG/DL (ref 6–23)
CALCIUM SERPL-MCNC: 9.4 MG/DL (ref 8.6–10.3)
CHLORIDE SERPL-SCNC: 98 MMOL/L (ref 98–107)
CO2 SERPL-SCNC: 31 MMOL/L (ref 21–32)
CREAT SERPL-MCNC: 0.76 MG/DL (ref 0.5–1.3)
EGFRCR SERPLBLD CKD-EPI 2021: >90 ML/MIN/1.73M*2
GLUCOSE SERPL-MCNC: 91 MG/DL (ref 74–99)
PHOSPHATE SERPL-MCNC: 3.1 MG/DL (ref 2.5–4.9)
POTASSIUM SERPL-SCNC: 4.5 MMOL/L (ref 3.5–5.3)
PROT SERPL-MCNC: 6.9 G/DL (ref 6.4–8.2)
SODIUM SERPL-SCNC: 135 MMOL/L (ref 136–145)
URATE SERPL-MCNC: 5.5 MG/DL (ref 4–7.5)

## 2024-03-27 PROCEDURE — 84155 ASSAY OF PROTEIN SERUM: CPT

## 2024-03-27 PROCEDURE — 80069 RENAL FUNCTION PANEL: CPT

## 2024-03-27 PROCEDURE — 83930 ASSAY OF BLOOD OSMOLALITY: CPT

## 2024-03-27 PROCEDURE — 82436 ASSAY OF URINE CHLORIDE: CPT

## 2024-03-27 PROCEDURE — 84550 ASSAY OF BLOOD/URIC ACID: CPT

## 2024-03-27 PROCEDURE — 84133 ASSAY OF URINE POTASSIUM: CPT

## 2024-03-27 PROCEDURE — 84300 ASSAY OF URINE SODIUM: CPT

## 2024-03-27 PROCEDURE — 82570 ASSAY OF URINE CREATININE: CPT

## 2024-03-27 PROCEDURE — 36415 COLL VENOUS BLD VENIPUNCTURE: CPT

## 2024-03-28 ENCOUNTER — APPOINTMENT (OUTPATIENT)
Dept: NEPHROLOGY | Facility: CLINIC | Age: 74
End: 2024-03-28
Payer: COMMERCIAL

## 2024-03-28 LAB
CHLORIDE UR-SCNC: 100 MMOL/L
CHLORIDE/CREATININE (MMOL/G) IN URINE: 118 MMOL/G CREAT (ref 23–275)
CREAT UR-MCNC: 84.7 MG/DL (ref 20–370)
OSMOLALITY SERPL: 288 MOSM/KG (ref 280–300)
POTASSIUM UR-SCNC: 80 MMOL/L
POTASSIUM/CREAT UR-RTO: 94 MMOL/G CREAT
SODIUM UR-SCNC: 94 MMOL/L
SODIUM/CREAT UR-RTO: 111 MMOL/G CREAT

## 2024-04-02 ENCOUNTER — DOCUMENTATION (OUTPATIENT)
Dept: PHYSICAL THERAPY | Facility: CLINIC | Age: 74
End: 2024-04-02
Payer: COMMERCIAL

## 2024-04-02 NOTE — PROGRESS NOTES
Discharge Summary    Name: Luis Manuel Avitia  MRN: [id number]  : 1950  Date: 24    Discharge Summary: PT    Discharge Information: Date of discharge 23    Therapy Summary: Minimal progress since eval with fair HEP complaince    Discharge Status:  Patient notes- his shoulder is less painful that previous appointment. His resting pain is 1-2 at the time of appointment. His pain will elevate with movements an activity.  -per note in [facility] on 24    Rehab Discharge Reason: Progress plateaued; further improvement possible

## 2024-04-12 ENCOUNTER — APPOINTMENT (OUTPATIENT)
Dept: NEPHROLOGY | Facility: CLINIC | Age: 74
End: 2024-04-12
Payer: COMMERCIAL

## 2024-04-23 ENCOUNTER — OFFICE VISIT (OUTPATIENT)
Dept: CARDIOLOGY | Facility: CLINIC | Age: 74
End: 2024-04-23
Payer: COMMERCIAL

## 2024-04-23 VITALS
HEIGHT: 68 IN | WEIGHT: 214 LBS | BODY MASS INDEX: 32.43 KG/M2 | SYSTOLIC BLOOD PRESSURE: 122 MMHG | HEART RATE: 67 BPM | DIASTOLIC BLOOD PRESSURE: 70 MMHG | OXYGEN SATURATION: 99 %

## 2024-04-23 DIAGNOSIS — R07.9 CHEST PAIN, UNSPECIFIED TYPE: Primary | ICD-10-CM

## 2024-04-23 LAB
ATRIAL RATE: 63 BPM
P AXIS: 3 DEGREES
P OFFSET: 184 MS
P ONSET: 124 MS
PR INTERVAL: 202 MS
Q ONSET: 225 MS
QRS COUNT: 10 BEATS
QRS DURATION: 142 MS
QT INTERVAL: 458 MS
QTC CALCULATION(BAZETT): 468 MS
QTC FREDERICIA: 465 MS
R AXIS: -22 DEGREES
T AXIS: 11 DEGREES
T OFFSET: 454 MS
VENTRICULAR RATE: 63 BPM

## 2024-04-23 PROCEDURE — 3078F DIAST BP <80 MM HG: CPT | Performed by: NURSE PRACTITIONER

## 2024-04-23 PROCEDURE — 99214 OFFICE O/P EST MOD 30 MIN: CPT | Performed by: NURSE PRACTITIONER

## 2024-04-23 PROCEDURE — 99214 OFFICE O/P EST MOD 30 MIN: CPT | Mod: 25 | Performed by: NURSE PRACTITIONER

## 2024-04-23 PROCEDURE — 3074F SYST BP LT 130 MM HG: CPT | Performed by: NURSE PRACTITIONER

## 2024-04-23 PROCEDURE — 1160F RVW MEDS BY RX/DR IN RCRD: CPT | Performed by: NURSE PRACTITIONER

## 2024-04-23 PROCEDURE — 1159F MED LIST DOCD IN RCRD: CPT | Performed by: NURSE PRACTITIONER

## 2024-04-23 PROCEDURE — 93005 ELECTROCARDIOGRAM TRACING: CPT | Performed by: NURSE PRACTITIONER

## 2024-04-23 PROCEDURE — 1036F TOBACCO NON-USER: CPT | Performed by: NURSE PRACTITIONER

## 2024-04-23 PROCEDURE — 93010 ELECTROCARDIOGRAM REPORT: CPT | Performed by: INTERNAL MEDICINE

## 2024-04-23 ASSESSMENT — LIFESTYLE VARIABLES
HOW OFTEN DO YOU HAVE SIX OR MORE DRINKS ON ONE OCCASION: NEVER
SKIP TO QUESTIONS 9-10: 1
AUDIT-C TOTAL SCORE: 4
HOW MANY STANDARD DRINKS CONTAINING ALCOHOL DO YOU HAVE ON A TYPICAL DAY: 1 OR 2
HOW OFTEN DO YOU HAVE A DRINK CONTAINING ALCOHOL: 4 OR MORE TIMES A WEEK

## 2024-04-23 ASSESSMENT — ENCOUNTER SYMPTOMS
DEPRESSION: 0
MUSCULOSKELETAL NEGATIVE: 1
GASTROINTESTINAL NEGATIVE: 1
OCCASIONAL FEELINGS OF UNSTEADINESS: 0
RESPIRATORY NEGATIVE: 1
NEUROLOGICAL NEGATIVE: 1
LOSS OF SENSATION IN FEET: 0
CARDIOVASCULAR NEGATIVE: 1

## 2024-04-23 NOTE — PROGRESS NOTES
"Chief Complaint:   Follow-up (8-10 MONTH FUV)    History Of Present Illness:    .Mr Avitia returns today in follow up.  He has been fatigued and has bilateral mild pedal edema.  Is taking sodium tablets per nephrology for hyponatremia.   Will see nephrology tomorrow.  Denies chest pain, sob, palpitations or pedal edema.  Reports \"brain fog\" which may have coincided with covid infection 01/2023.         Last Recorded Vitals:  Blood pressure 122/70, pulse 67, height 1.727 m (5' 8\"), weight 97.1 kg (214 lb), SpO2 99%.     Past Medical History:  Past Medical History:   Diagnosis Date    CAD (coronary artery disease)     status post CABG x2  with LIMA to LAD and saphenous vein graft to obtuse marginal branch of the LCx    Chronic hyponatremia     followed by nephrology, managed on meds; 9/7/23 CD=331    Encounter for other preprocedural examination 12/08/2020    Preoperative clearance    HLD (hyperlipidemia)     HTN (hypertension)     Lumbar herniated disc     OA (osteoarthritis)     JOHNATHAN on CPAP     Other intervertebral disc displacement, lumbar region 10/30/2020    Lumbar disc herniation    Personal history of other diseases of the circulatory system     History of hypertension    Personal history of other diseases of the nervous system and sense organs     History of obstructive sleep apnea    SIADH (syndrome of inappropriate ADH production) (Multi)         Past Surgical History:  Past Surgical History:   Procedure Laterality Date    CARDIAC CATHETERIZATION  2022    KNEE SURGERY Right 05/11/2016    Knee Surgery    OTHER SURGICAL HISTORY  09/23/2022    Coronary artery bypass graft    OTHER SURGICAL HISTORY Left 12/17/2021    Hip surgery    SHOULDER SURGERY Right     TSR       Social History:  Social History     Socioeconomic History    Marital status:      Spouse name: None    Number of children: None    Years of education: None    Highest education level: None   Occupational History    None   Tobacco Use    " Smoking status: Former     Current packs/day: 0.00     Types: Cigarettes     Quit date: 1980     Years since quittin.3     Passive exposure: Past    Smokeless tobacco: Never   Vaping Use    Vaping status: Never Used   Substance and Sexual Activity    Alcohol use: Not Currently     Alcohol/week: 5.0 standard drinks of alcohol     Types: 5 Standard drinks or equivalent per week     Comment: 5 drinks weekly per pt    Drug use: Never    Sexual activity: Defer   Other Topics Concern    None   Social History Narrative    None     Social Determinants of Health     Financial Resource Strain: Not on file   Food Insecurity: Not on file   Transportation Needs: Not on file   Physical Activity: Not on file   Stress: Not on file   Social Connections: Not on file   Intimate Partner Violence: Not on file   Housing Stability: Not on file       Family History:  Family History   Problem Relation Name Age of Onset    Other (high blood pressure) Mother      Lung cancer Father           Allergies:  Patient has no known allergies.    Outpatient Medications:  Current Outpatient Medications   Medication Sig Dispense Refill    ammonium lactate (Amlactin) 12 % cream Ammonium Lactate 12 % External Cream APPLY TWICE A DAY Quantity: 385 Refills: 0 Ordered: 2020 DO Start : 2020 Active      aspirin (Adult Low Dose Aspirin) 81 mg EC tablet Take 1 tablet (81 mg) by mouth once daily. As directed      atorvastatin (Lipitor) 80 mg tablet TAKE 1 TABLET BY MOUTH EVERY DAY 30 tablet 12    ciprofloxacin-dexamethasone (CiproDEX) otic suspension Administer 4 drops into the right ear 2 times a day. 7.5 mL 0    fluticasone propionate (Xhance) 93 mcg/actuation aerosol breath activated Administer 1 puff into affected nostril(s) 2 times a day as needed.      metoprolol succinate XL (Toprol-XL) 100 mg 24 hr tablet Take 1 tablet daily 90 tablet 2    multivitamin (MULTIPLE VITAMINS ORAL) Take 1 tablet by mouth once daily.      pantoprazole  (ProtoNix) 20 mg EC tablet Take by mouth.      sildenafil (Viagra) 50 mg tablet Take 1 tablet (50 mg) by mouth once daily as needed.      sodium chloride 1,000 mg tablet Take 1 tablet twice daily 60 tablet 3    Ure-Na 15 gram powder in packet oral powder once daily. 90 packet 3    Ure-Na 15 gram powder in packet oral powder take 1 packet 3 times daily as directed 90 packet 6    mometasone (Elocon) 0.1 % cream Apply topically once daily for 21 days. 21 g 0     No current facility-administered medications for this visit.        Physical Exam:  Cardiovascular:      PMI at left midclavicular line. Normal rate. Regular rhythm. Normal S1. Normal S2.       Murmurs: There is no murmur.      No gallop.  No click. No rub.   Pulses:     Intact distal pulses.   Edema:     Thigh: bilateral trace edema of the thigh.     Pretibial: bilateral trace edema of the pretibial area.     Ankle: bilateral trace edema of the ankle.     Feet: bilateral trace edema of the feet.        ROS:  Review of Systems   Constitutional: Positive for malaise/fatigue.   Cardiovascular: Negative.    Respiratory: Negative.     Skin: Negative.    Musculoskeletal: Negative.    Gastrointestinal: Negative.    Genitourinary: Negative.    Neurological: Negative.           Last Labs:  CBC -  Lab Results   Component Value Date    WBC 6.5 10/13/2023    HGB 14.0 10/13/2023    HCT 42.7 10/13/2023     (H) 10/13/2023     10/13/2023       CMP -  Lab Results   Component Value Date    CALCIUM 9.4 03/27/2024    PHOS 3.1 03/27/2024    PROT 6.9 03/27/2024    ALBUMIN 3.9 03/27/2024    AST 19 10/13/2023    ALT 22 10/13/2023    ALKPHOS 64 10/13/2023    BILITOT 0.7 10/13/2023       LIPID PANEL -   Lab Results   Component Value Date    CHOL 135 10/25/2023    TRIG 94 10/25/2023    HDL 64.2 10/25/2023    CHHDL 2.1 10/25/2023    LDLF 51 02/24/2023    VLDL 19 10/25/2023    NHDL 71 10/25/2023       RENAL FUNCTION PANEL -   Lab Results   Component Value Date    GLUCOSE 91  03/27/2024     (L) 03/27/2024    K 4.5 03/27/2024    CL 98 03/27/2024    CO2 31 03/27/2024    ANIONGAP 11 03/27/2024    BUN 15 03/27/2024    CREATININE 0.76 03/27/2024    GFRMALE >90 09/07/2023    CALCIUM 9.4 03/27/2024    PHOS 3.1 03/27/2024    ALBUMIN 3.9 03/27/2024        Lab Results   Component Value Date     (H) 01/02/2023    HGBA1C 5.4 10/25/2023         Assessment/Plan   Problem List Items Addressed This Visit    None    1. CAD, status post CABG x2 8/15/2022 with LIMA to LAD and saphenous vein graft to obtuse marginal branch of the LCx. This patient began to develop new onset exertional anginal type chest discomfort in late 7/2022. The patient was seen by cardiology and underwent cardiac catheterization on 8/11/2022 which demonstrated a critical 90% distal LMCA stenosis. The patient remained in the hospital and subsequently underwent a CABG x2 procedure on 8/15/2022. Clinically the patient is doing well and is scheduled to begin cardiac rehab in near future. He will be allowed to discontinue Plavix but will remain on long-term aspirin therapy. Additional adjustments in his antihypertensive therapy will be made as below.  Intra Op echo   CONCLUSIONS:   1. Left ventricular systolic function is normal with a 55-60% estimated ejection fraction.   2. The LVEF is 57% by 2D biplane volumetric analysis.   3. The left atrium is mild to moderately dilated.   4. There is No tricuspid stenosis.   5. Aortic valve stenosis is not present.   6. There is no evidence of a patent foramen ovale.   7. There is plaque visualized in the descending aorta.   8. There is plaque visualized in the transverse aorta.        2. Hypertension. Blood pressure currently in adequate range. Nevertheless we will adjust therapy by changing carvedilol 12.5 mg twice daily to Toprol- mg daily. We will reduce amlodipine from 10 mg daily to 5 mg daily but increase lisinopril from 5 to 10 mg daily. He is off lisinopril per  "nephrology. We will stop amlodipine due to pedal edema. PVR negative when done 08/2022.     3. Hyperlipidemia. Continue high intensity atorvastatin 80 mg daily with optimal results.     4. Chronic hyponatremia. The patient has had hyponatremia in the past thought to be related to diuretic therapy. He follows with nephrology.     5. BPH.     6.  \"Brain fog\".  Referral to neurology.   Carotid US done 10/2023 showed < 50% stenosis bilaterally.   MR Brain  IMPRESSION  *Interval progression of volume loss, ventricular dilatation and  demyelination compared to the previous exam.  *There is no evidence of mass, cerebral infarction or hemorrhage.      Christi Cordova, APRN-CNP  "

## 2024-04-24 ENCOUNTER — OFFICE VISIT (OUTPATIENT)
Dept: NEPHROLOGY | Facility: CLINIC | Age: 74
End: 2024-04-24
Payer: COMMERCIAL

## 2024-04-24 VITALS
OXYGEN SATURATION: 99 % | DIASTOLIC BLOOD PRESSURE: 68 MMHG | BODY MASS INDEX: 32.83 KG/M2 | HEART RATE: 60 BPM | SYSTOLIC BLOOD PRESSURE: 114 MMHG | RESPIRATION RATE: 16 BRPM | TEMPERATURE: 98 F | WEIGHT: 216.6 LBS | HEIGHT: 68 IN

## 2024-04-24 DIAGNOSIS — E22.2 SIADH (SYNDROME OF INAPPROPRIATE ADH PRODUCTION) (MULTI): Primary | ICD-10-CM

## 2024-04-24 DIAGNOSIS — E87.1 HYPONATREMIA: ICD-10-CM

## 2024-04-24 DIAGNOSIS — I10 PRIMARY HYPERTENSION: ICD-10-CM

## 2024-04-24 DIAGNOSIS — R60.0 LOCALIZED EDEMA: ICD-10-CM

## 2024-04-24 PROCEDURE — 3074F SYST BP LT 130 MM HG: CPT | Performed by: INTERNAL MEDICINE

## 2024-04-24 PROCEDURE — 1159F MED LIST DOCD IN RCRD: CPT | Performed by: INTERNAL MEDICINE

## 2024-04-24 PROCEDURE — 99215 OFFICE O/P EST HI 40 MIN: CPT | Performed by: INTERNAL MEDICINE

## 2024-04-24 PROCEDURE — 1160F RVW MEDS BY RX/DR IN RCRD: CPT | Performed by: INTERNAL MEDICINE

## 2024-04-24 PROCEDURE — 3078F DIAST BP <80 MM HG: CPT | Performed by: INTERNAL MEDICINE

## 2024-04-24 NOTE — PROGRESS NOTES
Subjective       Luis Manuel Avitia is a 73 y.o. male who has past medical history of  hypertension, JOHNATHAN on CPAP who is coming for hyponatremia follow-up visit.    Last office visit September 2023.  Luis Manuel came alone today.  Recent blood work showed improved sodium 135 and within normal serum osmolality.  He is adherent to salt tablets and urea sodium packets.  He reports occasional leg swelling/ankle swelling that gets better in the morning.  He follows fluid restriction 45-50 ounces daily.  He reports brain fogginess, chronic tiredness-not as bad as before but still there.  He is adherent to his CPAP machine.  He reports lacking motivation.  No lower urinary tract symptoms.  Blood pressure is in target.  With normal serum creatinine and BUN        Prior notes     Last office visit March 2023. In interim, Mr. Avitia had his shoulder surgery successfully done. Pain improved. Today he came alone. He reports self stopped urea powder due to high co-pay. He started feeling foggy and restarted his urea powder 3 weeks ago which he thinks improved some. He tries to limit fluid intake as possible but not limited to the 45 ounces allowance. He is adherent to salt tablet and high-protein intake. Today, no complaints. No significant brain fogginess. No significant hypervolemia on exam. Amlodipine was stopped recently per cardiology due to leg swelling. Not noted any today. He denies any beer intake. Most recent blood work done in May 2023 (4 months ago) showed low serum sodium. Patient is due to repeat hyponatremia labs today. We will advise based on results. Today we discussed adherence to urea powder as possible     Per prior notes     Patient was last seen in January 2023. On salt and Urea tablets for his SIADH and was concerned about the high cost of the urea tablets however the tolvaptan is more expensive. States his shoulder surgery for 02/01/2023 was cancelled due to him eating in the morning of the surgery and is now  rescheduled for 03/06/2023. He continues to complain of mental fogginess and fatigue but believes it may be related to him waking up several times at night due to shoulder pain. his Na is now 133 and Osm is now within normal limits. Denies any nausea vomiting, seizures or tremors or leg swelling. Blood pressure controlled on current medications.      Per prior notes     PT presents alone today. PT was recently in admitted to the hospital due to COVID-19, hyponatremia (124), and SIADH. During admission, PT was started on sodium chloride tablets PO BID, fluid restriction to 35 ounces qd, and Uria tablets PO BID. PT claims consuming less than 35 ounces is very difficult so has been consuming about 40-45 ounces a day. PT reports to be in pain from right shoulder that is scheduled to be replaced this January. Pt was concerned because the cost of the urea tablets is too high. We discussed potentially starting Tolvaptan PO which needs to be confirmed with his insurance first. We decided to wait until after his shoulder surgery to make sure it not affect his stabilized sodium levels, and consider taking upon next office visit. Sodium today was 132 which is much better, Serum Osm WNL, and sodium leak in the urine is WNL.        Per prior notes     LOV 11/2022 - patient was made aware of SIADH syndrome and significant hypoNa. I discussed possible admission to start Tolvaptan. He declined as he was busy and reported he would call when he had time. He missed his appintment with me on 12/5. Eventually, his shoulder sx was cancelled due to HypoNa so he contacted my office to reschedule a visit to discuss further steps.      Mr Aviita was seen virtually evaluated. No confusion or brain fogginess. He remains to feel tired. He continues to limit fluid intake. Today he was made aware of the sever hypoNa that needs to be addressed ASAP. I offered hospital admission at [facility] to start Tolvaptan. I also offered admission at [facility] (  based on his wife preference).      Per prior notes      Last office visit October 2022. In interim, he continues to follow fluid restriction and high-protein diet. He continues to feel extremely tired. He had blood work done that showed significantly low sodium 124 and low serum osmolarity 268. He denies significant wine/beer intake. He continues to have unsteady gait. He has flulike symptoms. He denies history of liver disease. Today I had full discussion with him regarding starting tolvaptan. I discussed side effects including increased urine output, possible dehydration, possible liver toxicity. He is agreeable to start     Per prior notes     patient was last seen in april 2022. 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 Patient was placed on lasix and lisinopril while inpatient. He discontinued his meds after discharge and hasn't been taking any of his BP meds. His BP is 169/96 today and he doesn't monitor at home due to equipment malfunction. His sodium is 126 today compared to 132 last visit and he complains of HA and lightheadedness. These symptoms resolved when his Na was 132. He follows a healthy diet with fluid restriction. He did well in the past and we stopped his RAAS inhibitor and switch to CCB (concerns about hyponatremia related to RAAS inhibitors)     per prior notes     Last office visit March 2022. We instructed limit fluid intake and increase protein intake. Luis Manuel came alone today. He feels much better. Less foggy and more energetic. Every now and then he still feels foggy that he attributes to poor sleep at night. He follows instruction. He checks blood pressure at home but he thinks his machine is over estimating his blood pressure and he is planning to calibrated. Blood pressure in office today is accepted. No NSAID use at home. No leg swelling or shortness of breath. Luis Manuel did blood work yesterday and results were discussed with him today  "including improved sodium 132, normal serum osmolarity, normal electrolytes, normal lipid profile     Per prior notes  From came alone today. He is aware of chronic history of hyponatremia that was asymptomatic. Lately for the last 10 months or so he has been dealing with brain fogginess and excessive fatigue. He reports imbalanced gait recently. He was told it might be related to his low sodium. He was evaluated once by nephrology in the past but he lost to follow-up. He reports drinking wine and martini every night with dinner. He reports chronic pain due to chronic arthritis and shoulder pain. He takes Tylenol and meloxicam daily. He denies lower urinary tract symptoms. No leg swelling. He reports history of hypertension and his blood pressure is always in good control per office visits and random blood pressure checks at home. Today his blood pressure is high and he thinks it is related to poor sleep at night. He is adherent to CPAP machine due to obstructive sleep apnea. He has been on losartan 100 mg for 7 years. He did work-up done last month in February 2022 and all results were discussed with patient today include excellent kidney function, chronic hyponatremia, increased sodium leak in the urine. He denies cough or shortness of breath. He had MRI brain done recently that showed no masses     Social history: Quit smoking 40 years ago, he is , he works as a financial counselor  Surgical history: Right knee replacement          Objective   /68 (BP Location: Left arm, Patient Position: Standing, BP Cuff Size: Adult)   Pulse 60   Temp 36.7 °C (98 °F)   Resp 16   Ht 1.727 m (5' 8\")   Wt 98.2 kg (216 lb 9.6 oz)   SpO2 99%   BMI 32.93 kg/m²   Wt Readings from Last 3 Encounters:   04/24/24 98.2 kg (216 lb 9.6 oz)   04/23/24 97.1 kg (214 lb)   01/31/24 95.7 kg (211 lb)       Physical Exam    General appearance: no distress awake and alert on room air, euvolemic on exam  Eyes: non-icteric  HEENT: " "atrumatic head, PEERLA, moist mucosa  Skin: no apparent rash  Heart: NSR, S1, S2 normal, no murmur or gallop  Lungs: Symmetrical expansion,CTA bilat no wheezing/crackles  Abdomen: soft, nt/nd, obese  Extremities: no edema bilat  Neuro: No FND,asterixis, no focal deficits noticed        Review of Systems     Constitutional tiredness, fatigue, brain fogginess, lack of motivation  Eyes: no blurred vision and no diplopia.   ENT: no hearing loss, no earache, no sore throat, no swollen glands in the neck and no nasal discharge.   Cardiovascular: no chest pain, no palpitations and no lower extremity edema.   Respiratory: no shortness of breath, no chronic cough and no shortness of breath during exertion.   Gastrointestinal: no abdominal pain, no constipation, no heartburn, no vomiting, no bloody stools and no change in bowel movements.   Genitourinary: no dysuria and no hematuria.   Musculoskeletal: no arthralgias and no myalgias.   Skin: no rashes and no skin lesions.   Neurological: no headaches and no dizziness.   Psychiatric:-Lack of motivation  Endocrine: no heat intolerance, no cold intolerance, appetite not increased, no thyroid disorder, no increased urinary frequency and no dry skin.   Hematologic/Lymphatic: does not bleed easily and does not bruise easily.   All other systems have been reviewed and are negative for complaint.         Data Review                   Lab Results   Component Value Date    URICACID 5.5 03/27/2024           Lab Results   Component Value Date    HGBA1C 5.4 10/25/2023                 No lab exists for component: \"CR\", \"PHOSPHORUS\"        No results found for: \"MICROALBCREA\"         RFP  Recent Labs     03/27/24  0901 10/13/23  1444 09/07/23  0754 05/17/23  0824 03/07/23  1101 02/24/23  0730 01/24/23  1642 01/18/23  1449 01/02/23  1928 12/10/22  0918 12/01/22  1518 11/21/22  1529   * 134* 133* 129* 129* 133* 130* 132*   < > 130*   < > 124*   K 4.5 4.1 4.4 4.2 3.6 4.5 4.1 4.0   < > " "4.5   < > 3.8   CL 98 97* 95* 93* 94* 98 95* 96*   < > 94*   < > 90*   CO2 31 27 30 30 27 30 27 28   < > 28   < > 28   BUN 15 34* 13 15 14 38* 37* 51*   < > 24*   < > 17   CREATININE 0.76 0.76 0.74 0.77 0.59 0.79 0.67 0.80   < > 0.68   < > 0.62   GLUCOSE 91 156* 78 84 128* 85 84 151*   < > 162*   < > 105*   CALCIUM 9.4 9.0 9.5 9.3 8.3* 9.3 9.6 9.4   < > 9.4   < > 9.4   PHOS 3.1  --  3.2 3.5  --  4.0  --  3.5  --  3.2  --  3.0   EGFR >90 >90  --   --   --   --   --   --   --   --   --   --    ANIONGAP 11 14 12 10 12 10 12 12   < > 13   < > 10    < > = values in this interval not displayed.        Urineanalysis  Recent Labs     08/12/22  0744   COLORU YELLOW   APPEARANCEU CLEAR   SPECGRAVU 1.013   MELIDA 7.0   PROTUR NEGATIVE   GLUCOSEU NEGATIVE   BLOODU NEGATIVE   KETONESU NEGATIVE   BILIRUBINU NEGATIVE   NITRITEU NEGATIVE   LEUKOCYTESU NEGATIVE       Urine Electrolytes  Recent Labs     03/27/24  0901 09/07/23  0754 05/17/23  0824 02/24/23  0730 01/18/23  1449 11/22/22  1230 09/26/22  0708 08/12/22  0744 04/12/22  1456 02/15/22  0718   SODIUMURR 94 57 68 53 28 86 82  --  58 89   NACREATUR 111 89 60 76 59 119 112  --  104 116   KUR 80 55 55 45 35 41 49  --  38  --    KCREATUR 94 86 48 64 74 57 67  --  68  --    CREATU 84.7 64.3 114.0 69.9 47.4 72.1 73.2  --  55.9 76.5   PROTUR  --   --   --   --   --   --   --  NEGATIVE  --   --         Urine Micro  No results for input(s): \"WBCU\", \"RBCU\", \"HYALCASTU\", \"SQUAMEPIU\", \"BACTERIAU\", \"MUCUSU\" in the last 24727 hours.     Iron  Recent Labs     10/13/23  1444 01/02/23  1928 02/15/22  0718 08/26/21  0745   IRON 81  --  156* 157*   TIBC 324  --  364 335   IRONSAT 25  --  43 47*   FERRITIN  --  357* 594*  --           Current Outpatient Medications on File Prior to Visit   Medication Sig Dispense Refill    ammonium lactate (Amlactin) 12 % cream Ammonium Lactate 12 % External Cream APPLY TWICE A DAY Quantity: 385 Refills: 0 Ordered: 25-Nov-2020 DO Start : 25-Nov-2020 Active      " aspirin (Adult Low Dose Aspirin) 81 mg EC tablet Take 1 tablet (81 mg) by mouth once daily. As directed      atorvastatin (Lipitor) 80 mg tablet TAKE 1 TABLET BY MOUTH EVERY DAY 30 tablet 12    fluticasone propionate (Xhance) 93 mcg/actuation aerosol breath activated Administer 1 puff into affected nostril(s) 2 times a day as needed.      metoprolol succinate XL (Toprol-XL) 100 mg 24 hr tablet Take 1 tablet daily 90 tablet 2    multivitamin (MULTIPLE VITAMINS ORAL) Take 1 tablet by mouth once daily.      sodium chloride 1,000 mg tablet Take 1 tablet twice daily 60 tablet 3    Ure-Na 15 gram powder in packet oral powder once daily. 90 packet 3    ciprofloxacin-dexamethasone (CiproDEX) otic suspension Administer 4 drops into the right ear 2 times a day. (Patient not taking: Reported on 4/24/2024) 7.5 mL 0    pantoprazole (ProtoNix) 20 mg EC tablet Take by mouth.      sildenafil (Viagra) 50 mg tablet Take 1 tablet (50 mg) by mouth once daily as needed.      Ure-Na 15 gram powder in packet oral powder take 1 packet 3 times daily as directed (Patient not taking: Reported on 4/24/2024) 90 packet 6    [DISCONTINUED] mometasone (Elocon) 0.1 % cream Apply topically once daily for 21 days. 21 g 0     No current facility-administered medications on file prior to visit.           Assessment and Plan       Luis Manuel Avitia  is a 73 y.o. male who has past medical history of hypertension, JOHNATHAN on CPAP who is coming for hyponatremia follow-up         Impression   #Chronic hyponatremia : most likely related to SIADH-possibly secondary to chronic shoulder pain. No history of malignancy. MRI brain reviewed with no masses. No pulmonary symptoms to suggest lung neoplasia-lung image done recently reviewed with no lung masses or evidence of malignancy  -Symptomatic with fatigue, excessive tiredness, flulike symptoms and brain fogginess and gait imbalance  - Base line Serum Na : 128-132 improved Na 135 in March 2024  -Most recent serum  osmolality is normal 286 in March 2024  - Volume status: Euvolemic  - Other lab work : Within normal lipid profile, with normal uric acid, with normal serum protein and within normal TSH  - Urine analysis : [place] with no blood or protein/albumin earlier per office visit  - ETOH consumption daily counseled to cut off-today, he confirms not use any beer intake at all  - Fluid restriction (less than 45-50 ounces per day) and increased protein diet  - Continue sodium chloride tablets 1 g 3 times daily and Urea-NA packets once daily.   -No problems with affordability  - Continue all medications as prescribed     #Hypertension-on metoprolol succinate  mg daily. Amlodipine was stopped per cardiology due to leg swelling  -refrain from ACE/ARbs due to case reports concerning for hyponatremia.     Blood work and urinalysis today to monitor serum sodium. I will call with results and further recommendation     Follow up in 6 months with repeat blood work and urinalysis prior to next visit        Cat Jj MD, MS, AL SAMAYOA  Clinical  - [employer]  Nephrologist - [facility]

## 2024-04-24 NOTE — PATIENT INSTRUCTIONS
Dear SILVINA   It was nice seeing you in the nephrology clinic today     Today we discussed the following:     #Low sodium in blood with symptoms. Now that improved significantly with fluid restriction, salt tablets 1 g 3 times daily and urea packets once daily. Continue all same.   # Occasional none frequent ankle swelling-continue to limit fluid intake no more than 50 ounces daily  #Hypertension-your blood pressure is accepted-continue metoprolol succinate  mg daily. Amlodipine was stopped per cardiology due to leg swelling. Continue to hold lisinopril/losartan for concerns about medication causing hyponatremia. Continue to check blood pressure at home when possible to target 120/80  # Chronic fatigue, tiredness, fogginess unlikely related to sodium/kidney issues        Follow-up in 6 months or earlier earlier as needed     Please repeat blood work and urine analysis prior to visit     Please call our office if you have any question  Thank you for coming to see me today     Cat Jj MD, MS, AL SAMAYOA  Clinical  - [facility] [employer]  Nephrologist - [facility]

## 2024-04-25 ENCOUNTER — TELEPHONE (OUTPATIENT)
Dept: PRIMARY CARE | Facility: CLINIC | Age: 74
End: 2024-04-25
Payer: COMMERCIAL

## 2024-04-25 NOTE — TELEPHONE ENCOUNTER
Patient states he pulled a hamstring muscle yesterday late afternoon.  Wants to know if you have any advice for him to treat.

## 2024-04-26 DIAGNOSIS — S76.309A HAMSTRING INJURY, UNSPECIFIED LATERALITY, INITIAL ENCOUNTER: Primary | ICD-10-CM

## 2024-05-03 ENCOUNTER — TELEPHONE (OUTPATIENT)
Dept: OTOLARYNGOLOGY | Facility: CLINIC | Age: 74
End: 2024-05-03
Payer: COMMERCIAL

## 2024-05-03 DIAGNOSIS — G47.33 OBSTRUCTIVE SLEEP APNEA: Primary | ICD-10-CM

## 2024-05-06 ENCOUNTER — TELEPHONE (OUTPATIENT)
Dept: PRIMARY CARE | Facility: CLINIC | Age: 74
End: 2024-05-06
Payer: COMMERCIAL

## 2024-05-06 DIAGNOSIS — R41.89 BRAIN FOG: Primary | ICD-10-CM

## 2024-05-08 NOTE — TELEPHONE ENCOUNTER
Gerardo lozano [facility] gave me a number for nationwide of [phone].    Fax [phone].   Paperwork faxed

## 2024-05-09 ENCOUNTER — TELEPHONE (OUTPATIENT)
Dept: PRIMARY CARE | Facility: CLINIC | Age: 74
End: 2024-05-09
Payer: COMMERCIAL

## 2024-05-09 NOTE — TELEPHONE ENCOUNTER
Patient needs a referral that specifically states he needs to see Dr. Nancy Alexander, please send. The office will not schedule him until it states her name

## 2024-06-20 ENCOUNTER — OFFICE VISIT (OUTPATIENT)
Dept: ORTHOPEDIC SURGERY | Facility: CLINIC | Age: 74
End: 2024-06-20
Payer: COMMERCIAL

## 2024-06-20 VITALS — HEIGHT: 68 IN | WEIGHT: 216 LBS | BODY MASS INDEX: 32.74 KG/M2

## 2024-06-20 DIAGNOSIS — G56.01 CARPAL TUNNEL SYNDROME OF RIGHT WRIST: Primary | ICD-10-CM

## 2024-06-20 PROCEDURE — 99213 OFFICE O/P EST LOW 20 MIN: CPT | Performed by: PHYSICIAN ASSISTANT

## 2024-06-20 PROCEDURE — 20526 THER INJECTION CARP TUNNEL: CPT | Performed by: PHYSICIAN ASSISTANT

## 2024-06-20 PROCEDURE — 2500000004 HC RX 250 GENERAL PHARMACY W/ HCPCS (ALT 636 FOR OP/ED): Performed by: PHYSICIAN ASSISTANT

## 2024-06-20 PROCEDURE — 2500000005 HC RX 250 GENERAL PHARMACY W/O HCPCS: Performed by: PHYSICIAN ASSISTANT

## 2024-06-20 PROCEDURE — 1159F MED LIST DOCD IN RCRD: CPT | Performed by: PHYSICIAN ASSISTANT

## 2024-06-20 RX ORDER — TRIAMCINOLONE ACETONIDE 40 MG/ML
20 INJECTION, SUSPENSION INTRA-ARTICULAR; INTRAMUSCULAR
Status: COMPLETED | OUTPATIENT
Start: 2024-06-20 | End: 2024-06-20

## 2024-06-20 RX ORDER — LIDOCAINE HYDROCHLORIDE 10 MG/ML
0.5 INJECTION INFILTRATION; PERINEURAL
Status: COMPLETED | OUTPATIENT
Start: 2024-06-20 | End: 2024-06-20

## 2024-06-20 ASSESSMENT — PAIN - FUNCTIONAL ASSESSMENT: PAIN_FUNCTIONAL_ASSESSMENT: 0-10

## 2024-06-20 NOTE — PROGRESS NOTES
Patient returns clinic today for recurrence right carpal tunnel syndrome.  Has received intermittent steroid injections and done well with them.  He is requesting a new injection today.    Patient's self reported past medical history, medications, allergies, surgical history, family and social history as well as a 10 point review of systems has been documented in the new patient intake form and scanned into the patient's electronic medical record. Pertinent findings are documented in the HPI.    Physical Examination Findings:  Constitutional: Appears well-developed and well-nourished.  Head: Normocephalic and atraumatic.  Eyes: Pupils are equal and round.  Cardiovascular: Intact distal pulses.   Respiratory: Effort normal. No respiratory distress.  Neurologic: Alert and oriented to person, place, and time.  Skin: Skin is warm and dry.  Hematologic / Lymphatic: No lymphedema, lymphangitis.  Psychiatric: normal mood and affect. Behavior is normal.   Musculoskeletal: Right hand without any significant thenar intrinsic muscle atrophy.  Full digital range of motion.  Abnormal subjective sensation in the median nerve distribution positive Germaine median nerve compression test.    Impression: Right carpal tunnel syndrome    Plan: Repeat steroid injection was performed today and tolerated well.  At this time he is not ready for any surgical intervention.  He will continue to monitor symptoms and return to our office as needed.    Patient ID: Luis Manuel Avitia is a 73 y.o. male.    Hand / UE Inj/Asp: R carpal tunnel for carpal tunnel syndrome on 6/20/2024 9:20 AM  Indications: therapeutic  Details: 25 G needle  Medications: 20 mg triamcinolone acetonide 40 mg/mL; 0.5 mL lidocaine 10 mg/mL (1 %)  Procedure, treatment alternatives, risks and benefits explained, specific risks discussed. Immediately prior to procedure a time out was called to verify the correct patient, procedure, equipment, support staff and site/side marked as  required.           Edna Nam PA-C  Department of Orthopaedic Surgery  [facility]    Dictation performed with the use of voice recognition software. Syntax and grammatical errors may exist.

## 2024-07-16 ENCOUNTER — TELEMEDICINE (OUTPATIENT)
Dept: PRIMARY CARE | Facility: CLINIC | Age: 74
End: 2024-07-16
Payer: COMMERCIAL

## 2024-07-16 DIAGNOSIS — J32.9 SINUSITIS, UNSPECIFIED CHRONICITY, UNSPECIFIED LOCATION: Primary | ICD-10-CM

## 2024-07-16 PROCEDURE — 99213 OFFICE O/P EST LOW 20 MIN: CPT | Performed by: FAMILY MEDICINE

## 2024-07-16 RX ORDER — DOXYCYCLINE 100 MG/1
100 CAPSULE ORAL 2 TIMES DAILY
Qty: 14 CAPSULE | Refills: 0 | Status: SHIPPED | OUTPATIENT
Start: 2024-07-16 | End: 2024-07-23

## 2024-07-16 RX ORDER — CETIRIZINE HYDROCHLORIDE 10 MG/1
10 TABLET ORAL DAILY
Qty: 14 TABLET | Refills: 0 | Status: SHIPPED | OUTPATIENT
Start: 2024-07-16 | End: 2024-07-30

## 2024-07-16 ASSESSMENT — ENCOUNTER SYMPTOMS
SHORTNESS OF BREATH: 0
LIGHT-HEADEDNESS: 0
FATIGUE: 1
FEVER: 1
DIZZINESS: 0
HEADACHES: 1

## 2024-07-16 NOTE — PROGRESS NOTES
Chief Complaint Luis Manuel Avitia is a 73 y.o. male who presents for video telehealth     HPI:  Pt states that for the past 16 days he has been having congestions, sinus pain, and headaches. He initially had a large amount of mucus but that has then improved. Only has been taking tylenol and some mucinex that has not been helping much. Patient denies any other complaints or concerns. Denies any chest pain, shortness of breath, syncope, and weakness.      ROS:  Review of Systems   Constitutional:  Positive for fatigue and fever.   HENT:  Positive for congestion.    Respiratory:  Negative for shortness of breath.    Neurological:  Positive for headaches. Negative for dizziness and light-headedness.       Meds:    Current Outpatient Medications:     ammonium lactate (Amlactin) 12 % cream, Ammonium Lactate 12 % External Cream APPLY TWICE A DAY Quantity: 385 Refills: 0 Ordered: 25-Nov-2020 DO Start : 25-Nov-2020 Active, Disp: , Rfl:     aspirin (Adult Low Dose Aspirin) 81 mg EC tablet, Take 1 tablet (81 mg) by mouth once daily. As directed, Disp: , Rfl:     atorvastatin (Lipitor) 80 mg tablet, TAKE 1 TABLET BY MOUTH EVERY DAY, Disp: 30 tablet, Rfl: 12    cetirizine (ZyrTEC) 10 mg tablet, Take 1 tablet (10 mg) by mouth once daily for 14 days., Disp: 14 tablet, Rfl: 0    doxycycline (Vibramycin) 100 mg capsule, Take 1 capsule (100 mg) by mouth 2 times a day for 7 days. Take with at least 8 ounces (large glass) of water, do not lie down for 30 minutes after, Disp: 14 capsule, Rfl: 0    fluticasone propionate (Xhance) 93 mcg/actuation aerosol breath activated, Administer 1 puff into affected nostril(s) 2 times a day as needed., Disp: , Rfl:     metoprolol succinate XL (Toprol-XL) 100 mg 24 hr tablet, Take 1 tablet daily, Disp: 90 tablet, Rfl: 2    multivitamin (MULTIPLE VITAMINS ORAL), Take 1 tablet by mouth once daily., Disp: , Rfl:     pantoprazole (ProtoNix) 20 mg EC tablet, Take by mouth., Disp: , Rfl:     sildenafil (Viagra)  50 mg tablet, Take 1 tablet (50 mg) by mouth once daily as needed., Disp: , Rfl:     sodium chloride 1,000 mg tablet, Take 1 tablet twice daily, Disp: 60 tablet, Rfl: 3    Ure-Na 15 gram powder in packet oral powder, once daily., Disp: 90 packet, Rfl: 3    Ure-Na 15 gram powder in packet oral powder, take 1 packet 3 times daily as directed (Patient not taking: Reported on 4/24/2024), Disp: 90 packet, Rfl: 6    Allergies:  No Known Allergies    PE:  Visit Vitals  Smoking Status Former     Physical exam limited to telehealth exam.      Assessment/Plan  Luis Manuel Avitia is a 73 y.o. male who presents via video telehealth for sinusitis for the past 16 days. Due to length of illness with concurrent symptoms of headache with sinus pain and pressure this is most likely a bacterial sinusitis. Patient was prescribed doxycycline. It was recommended that patient also use flonase, zyrtec, and a nasal saline rinse. Patient agreed with this treatment and plan. Patient will follow up in one week if not improved.    Diagnoses and all orders for this visit:  Sinusitis, unspecified chronicity, unspecified location (Primary)  -     doxycycline (Vibramycin) 100 mg capsule; Take 1 capsule (100 mg) by mouth 2 times a day for 7 days. Take with at least 8 ounces (large glass) of water, do not lie down for 30 minutes after  -     cetirizine (ZyrTEC) 10 mg tablet; Take 1 tablet (10 mg) by mouth once daily for 14 days.         Follow up in one week if not improved      Patient was staffed with Dr. Cong Patel DO, PGY-2  [facility]

## 2024-07-16 NOTE — PROGRESS NOTES
I reviewed and examined the patient. I was present for the key exam elements, and I fully participated in the patient's care. I discussed the management of the care with the resident. I have personally reviewed the pertinent labs and imaging, as well as recent notes, with the patient. I have reviewed the note above and agree with the resident's medical decision making as documented in the resident's note, in addition to the following comments / findings:     Agree with the rest of the plan outlined below by resident physician. No red flags.      The patient understands and agrees to the assessment and plan of care. Patient has also agreed to follow up and comply with the treatment and evaluation as recommended today. Patient was instructed to call the office at [phone] should questions arise regarding their treatment or care.     Omer Gar DO, FAOASM  Family Medicine   [address], [address]  [place] [address]     Omer Gar DO

## 2024-07-25 ENCOUNTER — HOSPITAL ENCOUNTER (OUTPATIENT)
Dept: RADIOLOGY | Facility: HOSPITAL | Age: 74
Discharge: HOME | End: 2024-07-25
Payer: COMMERCIAL

## 2024-07-25 DIAGNOSIS — E22.2 SYNDROME OF INAPPROPRIATE SECRETION OF ANTIDIURETIC HORMONE (MULTI): ICD-10-CM

## 2024-07-25 PROCEDURE — 70551 MRI BRAIN STEM W/O DYE: CPT

## 2024-07-25 PROCEDURE — 70551 MRI BRAIN STEM W/O DYE: CPT | Performed by: RADIOLOGY

## 2024-08-05 ENCOUNTER — TELEPHONE (OUTPATIENT)
Dept: PRIMARY CARE | Facility: CLINIC | Age: 74
End: 2024-08-05
Payer: COMMERCIAL

## 2024-08-05 DIAGNOSIS — B36.9 FUNGAL DERMATITIS: Primary | ICD-10-CM

## 2024-08-05 RX ORDER — KETOCONAZOLE 20 MG/G
CREAM TOPICAL 2 TIMES DAILY
Qty: 30 G | Refills: 0 | Status: SHIPPED | OUTPATIENT
Start: 2024-08-05 | End: 2025-08-05

## 2024-08-12 ENCOUNTER — TELEPHONE (OUTPATIENT)
Dept: PRIMARY CARE | Facility: CLINIC | Age: 74
End: 2024-08-12
Payer: COMMERCIAL

## 2024-08-12 NOTE — TELEPHONE ENCOUNTER
Patient called stating that he had an MRI done by Dr. Nancy Alexander.  He has called the office three times asking for someone to call him with results and no one has gotten back to him.  He stated that he tried to look at the results on my chart and said to him some of the stuff sounded scary but he is not a doctor and doesn't understand what it means.    So he is asking if you would mind looking over the results and see if you see anything in there they you think he should be concerned about.

## 2024-08-14 NOTE — TELEPHONE ENCOUNTER
Patient called back and I just told him what jose juan wrote.  He asked what that meant and I told him that is stuff that I can not go over with him.  So I read what jose juan wrote again and he said okay thanks.

## 2024-08-15 ENCOUNTER — TELEPHONE (OUTPATIENT)
Dept: PRIMARY CARE | Facility: CLINIC | Age: 74
End: 2024-08-15
Payer: COMMERCIAL

## 2024-09-03 ENCOUNTER — LAB (OUTPATIENT)
Dept: LAB | Facility: LAB | Age: 74
End: 2024-09-03
Payer: COMMERCIAL

## 2024-09-03 DIAGNOSIS — E55.9 VITAMIN D DEFICIENCY, UNSPECIFIED: Primary | ICD-10-CM

## 2024-09-03 DIAGNOSIS — E53.8 DEFICIENCY OF OTHER SPECIFIED B GROUP VITAMINS: ICD-10-CM

## 2024-09-03 DIAGNOSIS — E22.2 SYNDROME OF INAPPROPRIATE SECRETION OF ANTIDIURETIC HORMONE (MULTI): ICD-10-CM

## 2024-09-03 DIAGNOSIS — U09.9 POST COVID-19 CONDITION, UNSPECIFIED: ICD-10-CM

## 2024-09-03 DIAGNOSIS — E53.1 PYRIDOXINE DEFICIENCY: ICD-10-CM

## 2024-09-03 LAB
25(OH)D3 SERPL-MCNC: 60 NG/ML (ref 30–100)
ALBUMIN SERPL BCP-MCNC: 4.1 G/DL (ref 3.4–5)
ALP SERPL-CCNC: 58 U/L (ref 33–136)
ALT SERPL W P-5'-P-CCNC: 19 U/L (ref 10–52)
ANION GAP SERPL CALC-SCNC: 10 MMOL/L (ref 10–20)
AST SERPL W P-5'-P-CCNC: 19 U/L (ref 9–39)
BASOPHILS # BLD AUTO: 0.03 X10*3/UL (ref 0–0.1)
BASOPHILS NFR BLD AUTO: 0.6 %
BILIRUB SERPL-MCNC: 0.8 MG/DL (ref 0–1.2)
BUN SERPL-MCNC: 12 MG/DL (ref 6–23)
CALCIUM SERPL-MCNC: 9.3 MG/DL (ref 8.6–10.3)
CHLORIDE SERPL-SCNC: 95 MMOL/L (ref 98–107)
CO2 SERPL-SCNC: 31 MMOL/L (ref 21–32)
CREAT SERPL-MCNC: 0.78 MG/DL (ref 0.5–1.3)
CRP SERPL-MCNC: <0.1 MG/DL
EGFRCR SERPLBLD CKD-EPI 2021: >90 ML/MIN/1.73M*2
ENA SS-A AB SER IA-ACNC: <0.2 AI
ENA SS-B AB SER IA-ACNC: <0.2 AI
EOSINOPHIL # BLD AUTO: 0.1 X10*3/UL (ref 0–0.4)
EOSINOPHIL NFR BLD AUTO: 2 %
ERYTHROCYTE [DISTWIDTH] IN BLOOD BY AUTOMATED COUNT: 13 % (ref 11.5–14.5)
ERYTHROCYTE [SEDIMENTATION RATE] IN BLOOD BY WESTERGREN METHOD: 4 MM/H (ref 0–20)
GLUCOSE SERPL-MCNC: 170 MG/DL (ref 74–99)
HCT VFR BLD AUTO: 41 % (ref 41–52)
HGB BLD-MCNC: 13.7 G/DL (ref 13.5–17.5)
IMM GRANULOCYTES # BLD AUTO: 0.01 X10*3/UL (ref 0–0.5)
IMM GRANULOCYTES NFR BLD AUTO: 0.2 % (ref 0–0.9)
LYMPHOCYTES # BLD AUTO: 1.21 X10*3/UL (ref 0.8–3)
LYMPHOCYTES NFR BLD AUTO: 23.7 %
MCH RBC QN AUTO: 34.7 PG (ref 26–34)
MCHC RBC AUTO-ENTMCNC: 33.4 G/DL (ref 32–36)
MCV RBC AUTO: 104 FL (ref 80–100)
MONOCYTES # BLD AUTO: 0.55 X10*3/UL (ref 0.05–0.8)
MONOCYTES NFR BLD AUTO: 10.8 %
NEUTROPHILS # BLD AUTO: 3.21 X10*3/UL (ref 1.6–5.5)
NEUTROPHILS NFR BLD AUTO: 62.7 %
NRBC BLD-RTO: 0 /100 WBCS (ref 0–0)
PLATELET # BLD AUTO: 245 X10*3/UL (ref 150–450)
POTASSIUM SERPL-SCNC: 4.7 MMOL/L (ref 3.5–5.3)
PROT SERPL-MCNC: 6.6 G/DL (ref 6.4–8.2)
RBC # BLD AUTO: 3.95 X10*6/UL (ref 4.5–5.9)
SODIUM SERPL-SCNC: 131 MMOL/L (ref 136–145)
VIT B12 SERPL-MCNC: 448 PG/ML (ref 211–911)
WBC # BLD AUTO: 5.1 X10*3/UL (ref 4.4–11.3)

## 2024-09-03 PROCEDURE — 80053 COMPREHEN METABOLIC PANEL: CPT

## 2024-09-03 PROCEDURE — 84207 ASSAY OF VITAMIN B-6: CPT

## 2024-09-03 PROCEDURE — 82607 VITAMIN B-12: CPT

## 2024-09-03 PROCEDURE — 86235 NUCLEAR ANTIGEN ANTIBODY: CPT

## 2024-09-03 PROCEDURE — 86038 ANTINUCLEAR ANTIBODIES: CPT

## 2024-09-03 PROCEDURE — 86140 C-REACTIVE PROTEIN: CPT

## 2024-09-03 PROCEDURE — 82306 VITAMIN D 25 HYDROXY: CPT

## 2024-09-03 PROCEDURE — 85025 COMPLETE CBC W/AUTO DIFF WBC: CPT

## 2024-09-03 PROCEDURE — 36415 COLL VENOUS BLD VENIPUNCTURE: CPT

## 2024-09-03 PROCEDURE — 85652 RBC SED RATE AUTOMATED: CPT

## 2024-09-04 LAB — ANA SER QL HEP2 SUBST: NEGATIVE

## 2024-09-06 LAB — PYRIDOXAL PHOS SERPL-SCNC: 112.7 NMOL/L (ref 20–125)

## 2024-09-16 ENCOUNTER — TELEPHONE (OUTPATIENT)
Dept: PRIMARY CARE | Facility: CLINIC | Age: 74
End: 2024-09-16
Payer: COMMERCIAL

## 2024-09-16 NOTE — TELEPHONE ENCOUNTER
Patient called stating that he has had hand pain that has been progressively getting worse for a few months.  He stated that it now has gotten to the point that he can not take it anymore.  I offered him an appointment on 10/01 and he laughed and said that is not going to work.  So I let him know that I would reach out to you and see if you could squeeze him in anytime sooner.  Please advise

## 2024-09-17 ENCOUNTER — OFFICE VISIT (OUTPATIENT)
Dept: ORTHOPEDIC SURGERY | Facility: CLINIC | Age: 74
End: 2024-09-17
Payer: COMMERCIAL

## 2024-09-17 ENCOUNTER — HOSPITAL ENCOUNTER (OUTPATIENT)
Dept: RADIOLOGY | Facility: CLINIC | Age: 74
Discharge: HOME | End: 2024-09-17
Payer: COMMERCIAL

## 2024-09-17 DIAGNOSIS — Z96.642 PRESENCE OF LEFT ARTIFICIAL HIP JOINT: ICD-10-CM

## 2024-09-17 DIAGNOSIS — Z96.642 S/P TOTAL LEFT HIP ARTHROPLASTY: ICD-10-CM

## 2024-09-17 PROCEDURE — 73502 X-RAY EXAM HIP UNI 2-3 VIEWS: CPT | Mod: LEFT SIDE | Performed by: RADIOLOGY

## 2024-09-17 PROCEDURE — 73502 X-RAY EXAM HIP UNI 2-3 VIEWS: CPT | Mod: LT

## 2024-09-17 PROCEDURE — 99214 OFFICE O/P EST MOD 30 MIN: CPT | Performed by: STUDENT IN AN ORGANIZED HEALTH CARE EDUCATION/TRAINING PROGRAM

## 2024-09-17 NOTE — PROGRESS NOTES
PRIMARY CARE PHYSICIAN: Omer Gar DO  REFERRING PROVIDER: No referring provider defined for this encounter.       SUBJECTIVE  CHIEF COMPLAINT:   Chief Complaint   Patient presents with    Left Hip - New Patient Visit, Pain        HPI: Luis Manuel Avitia is a pleasant 73 y.o. year-old male who is seen today for evaluation of left hip pain.  Patient previous underwent a left total hip replacement with my partner Dr. Wing.  He complains today of 8 to 10 months of progressive stiffness in his left hip.  The stiffness does not necessarily improve with activity.  He has tried over-the-counter medications.  He continues to exercise to the best of his abilities.  He has not any fall or trauma.  No change in activity.  No new hobbies.  No recent hospitalizations or infections.    REVIEW OF SYSTEMS  The patient denies any fever, chills, chest pain, shortness of breath or difficulty breathing.  Patient denies any numbness, tingling, or radicular symptoms.  Adult patient history sheet was filled out by the patient today in clinic and will be scanned into the EMR.  I personally reviewed this form which will be scanned into the EMR.  This includes Past Medical History, Past Surgical History, Medications, Allergies, Social History, Family History and 12 point review of systems.    Past Medical History:   Diagnosis Date    CAD (coronary artery disease)     status post CABG x2  with LIMA to LAD and saphenous vein graft to obtuse marginal branch of the LCx    Chronic hyponatremia     followed by nephrology, managed on meds; 9/7/23 JC=[id number]    Encounter for other preprocedural examination 12/08/2020    Preoperative clearance    HLD (hyperlipidemia)     HTN (hypertension)     Lumbar herniated disc     OA (osteoarthritis)     JOHNATHAN on CPAP     Other intervertebral disc displacement, lumbar region 10/30/2020    Lumbar disc herniation    Personal history of other diseases of the circulatory system     History of hypertension    Personal  history of other diseases of the nervous system and sense organs     History of obstructive sleep apnea    SIADH (syndrome of inappropriate ADH production) (Multi)         No Known Allergies     Past Surgical History:   Procedure Laterality Date    CARDIAC CATHETERIZATION      KNEE SURGERY Right 2016    Knee Surgery    OTHER SURGICAL HISTORY  2022    Coronary artery bypass graft    OTHER SURGICAL HISTORY Left 2021    Hip surgery    SHOULDER SURGERY Right     TSR        Family History   Problem Relation Name Age of Onset    Other (high blood pressure) Mother      Lung cancer Father          Social History     Socioeconomic History    Marital status:      Spouse name: Not on file    Number of children: Not on file    Years of education: Not on file    Highest education level: Not on file   Occupational History    Not on file   Tobacco Use    Smoking status: Former     Current packs/day: 0.00     Types: Cigarettes     Quit date: 1980     Years since quittin.7     Passive exposure: Past    Smokeless tobacco: Never   Vaping Use    Vaping status: Never Used   Substance and Sexual Activity    Alcohol use: Not Currently     Alcohol/week: 5.0 standard drinks of alcohol     Types: 5 Standard drinks or equivalent per week     Comment: 5 drinks weekly per pt    Drug use: Never    Sexual activity: Defer   Other Topics Concern    Not on file   Social History Narrative    Not on file     Social Determinants of Health     Financial Resource Strain: Not on file   Food Insecurity: Not on file   Transportation Needs: Not on file   Physical Activity: Not on file   Stress: Not on file   Social Connections: Not on file   Intimate Partner Violence: Not on file   Housing Stability: Not on file        CURRENT MEDICATIONS:   Current Outpatient Medications   Medication Sig Dispense Refill    ammonium lactate (Amlactin) 12 % cream Ammonium Lactate 12 % External Cream APPLY TWICE A DAY Quantity: 385  Refills: 0 Ordered: 25-Nov-2020 DO Start : 25-Nov-2020 Active      aspirin (Adult Low Dose Aspirin) 81 mg EC tablet Take 1 tablet (81 mg) by mouth once daily. As directed      atorvastatin (Lipitor) 80 mg tablet TAKE 1 TABLET BY MOUTH EVERY DAY 30 tablet 12    cetirizine (ZyrTEC) 10 mg tablet Take 1 tablet (10 mg) by mouth once daily for 14 days. 14 tablet 0    fluticasone propionate (Xhance) 93 mcg/actuation aerosol breath activated Administer 1 puff into affected nostril(s) 2 times a day as needed.      ketoconazole (NIZOral) 2 % cream Apply topically 2 times a day. 30 g 0    metoprolol succinate XL (Toprol-XL) 100 mg 24 hr tablet Take 1 tablet daily 90 tablet 2    multivitamin (MULTIPLE VITAMINS ORAL) Take 1 tablet by mouth once daily.      pantoprazole (ProtoNix) 20 mg EC tablet Take by mouth.      sildenafil (Viagra) 50 mg tablet Take 1 tablet (50 mg) by mouth once daily as needed.      sodium chloride 1,000 mg tablet Take 1 tablet twice daily 60 tablet 3    Ure-Na 15 gram powder in packet oral powder once daily. 90 packet 3     No current facility-administered medications for this visit.        OBJECTIVE    PHYSICAL EXAM  There is no height or weight on file to calculate BMI.    General: Well-appearing male in no acute distress.  Awake, alert and oriented.  Pleasant and cooperative.  Respiratory: Non-labored breathing  Mood: Euthymic   Gait: Normal  Assistive Device: None     Limb Length Discrepancy: None    Affected Left Hip  Range of motion:   Full and painless range of motion of the hip.  Pain with straight leg raise.  Pain with Stinchfield.  Hip Flexor Strength: 5/5  Abductor Strength: 5/5  Adductor Strength: 5/5  Tenderness: No trochanteric TTP  Sensation: Intact to light touch distally  Motor function: Able to fire TA, EHL, G/S  Pulses: Palpable DP pulse    IMAGING:  Independent review of left hip and pelvis x-rays was performed.  Press-fit left hip.  DePuy [place] and [place] components.  No evidence of  failure.      ASSESSMENT & PLAN    IMPRESSION:  Luis Manuel Avitia is a 73 y.o. male with several months of left hip pain in the setting of a previous total hip arthroplasty.  My impression is that the pain is referred from his lumbar spine.  However, I think it is prudent to first rule out infection and trunnion wear in this patient.    PLAN:  Patient to be referred to the lab for ESR, CRP, CBC, cobalt and chromium.  Assuming these are normal, we can obtain an MRI to look for signs of failure of the acetabular femoral component as well as tendinopathy.  Assuming the above are within normal limits, the neck step would be refer the patient to pain management for evaluation of his lumbar spine.    We will call the patient with the results of his blood work once they are obtained.    *This note was created using voice recognition software and was not corrected for typographical or grammatical errors.*

## 2024-09-19 ENCOUNTER — TELEPHONE (OUTPATIENT)
Dept: OTOLARYNGOLOGY | Facility: CLINIC | Age: 74
End: 2024-09-19
Payer: COMMERCIAL

## 2024-09-19 ENCOUNTER — APPOINTMENT (OUTPATIENT)
Dept: ORTHOPEDIC SURGERY | Facility: CLINIC | Age: 74
End: 2024-09-19
Payer: COMMERCIAL

## 2024-09-19 DIAGNOSIS — Z96.642 S/P TOTAL LEFT HIP ARTHROPLASTY: ICD-10-CM

## 2024-09-19 NOTE — TELEPHONE ENCOUNTER
Pt called he wants to switch DME.  New order was faxed to [facility]. Pt knows if he gets new cpap to make a 6-9 week follow up.

## 2024-09-20 ENCOUNTER — LAB (OUTPATIENT)
Dept: LAB | Facility: LAB | Age: 74
End: 2024-09-20
Payer: COMMERCIAL

## 2024-09-20 DIAGNOSIS — Z96.642 PRESENCE OF LEFT ARTIFICIAL HIP JOINT: ICD-10-CM

## 2024-09-20 LAB
BASOPHILS # BLD AUTO: 0.04 X10*3/UL (ref 0–0.1)
BASOPHILS NFR BLD AUTO: 0.6 %
CRP SERPL-MCNC: <0.1 MG/DL
EOSINOPHIL # BLD AUTO: 0.17 X10*3/UL (ref 0–0.4)
EOSINOPHIL NFR BLD AUTO: 2.4 %
ERYTHROCYTE [DISTWIDTH] IN BLOOD BY AUTOMATED COUNT: 12.8 % (ref 11.5–14.5)
ERYTHROCYTE [SEDIMENTATION RATE] IN BLOOD BY WESTERGREN METHOD: 11 MM/H (ref 0–20)
HCT VFR BLD AUTO: 42.7 % (ref 41–52)
HGB BLD-MCNC: 14.1 G/DL (ref 13.5–17.5)
IMM GRANULOCYTES # BLD AUTO: 0.01 X10*3/UL (ref 0–0.5)
IMM GRANULOCYTES NFR BLD AUTO: 0.1 % (ref 0–0.9)
LYMPHOCYTES # BLD AUTO: 1.77 X10*3/UL (ref 0.8–3)
LYMPHOCYTES NFR BLD AUTO: 25.4 %
MCH RBC QN AUTO: 34.6 PG (ref 26–34)
MCHC RBC AUTO-ENTMCNC: 33 G/DL (ref 32–36)
MCV RBC AUTO: 105 FL (ref 80–100)
MONOCYTES # BLD AUTO: 0.93 X10*3/UL (ref 0.05–0.8)
MONOCYTES NFR BLD AUTO: 13.3 %
NEUTROPHILS # BLD AUTO: 4.05 X10*3/UL (ref 1.6–5.5)
NEUTROPHILS NFR BLD AUTO: 58.2 %
NRBC BLD-RTO: 0 /100 WBCS (ref 0–0)
PLATELET # BLD AUTO: 279 X10*3/UL (ref 150–450)
RBC # BLD AUTO: 4.08 X10*6/UL (ref 4.5–5.9)
WBC # BLD AUTO: 7 X10*3/UL (ref 4.4–11.3)

## 2024-09-20 PROCEDURE — 86140 C-REACTIVE PROTEIN: CPT

## 2024-09-20 PROCEDURE — 83018 HEAVY METAL QUAN EACH NES: CPT

## 2024-09-20 PROCEDURE — 36415 COLL VENOUS BLD VENIPUNCTURE: CPT

## 2024-09-20 PROCEDURE — 85025 COMPLETE CBC W/AUTO DIFF WBC: CPT

## 2024-09-20 PROCEDURE — 85652 RBC SED RATE AUTOMATED: CPT

## 2024-09-20 PROCEDURE — 82495 ASSAY OF CHROMIUM: CPT

## 2024-09-23 LAB
COBALT SERPL-MCNC: <1 UG/L
CR SERPL-MCNC: 1.1 UG/L

## 2024-09-24 ENCOUNTER — APPOINTMENT (OUTPATIENT)
Dept: PRIMARY CARE | Facility: CLINIC | Age: 74
End: 2024-09-24
Payer: COMMERCIAL

## 2024-09-24 VITALS
TEMPERATURE: 98.7 F | HEART RATE: 64 BPM | BODY MASS INDEX: 33.34 KG/M2 | DIASTOLIC BLOOD PRESSURE: 82 MMHG | HEIGHT: 68 IN | WEIGHT: 220 LBS | OXYGEN SATURATION: 99 % | SYSTOLIC BLOOD PRESSURE: 136 MMHG

## 2024-09-24 DIAGNOSIS — M79.641 PAIN OF RIGHT HAND: Primary | ICD-10-CM

## 2024-09-24 PROCEDURE — 3008F BODY MASS INDEX DOCD: CPT | Performed by: FAMILY MEDICINE

## 2024-09-24 PROCEDURE — 3079F DIAST BP 80-89 MM HG: CPT | Performed by: FAMILY MEDICINE

## 2024-09-24 PROCEDURE — 3075F SYST BP GE 130 - 139MM HG: CPT | Performed by: FAMILY MEDICINE

## 2024-09-24 PROCEDURE — 99214 OFFICE O/P EST MOD 30 MIN: CPT | Performed by: FAMILY MEDICINE

## 2024-09-24 PROCEDURE — 1036F TOBACCO NON-USER: CPT | Performed by: FAMILY MEDICINE

## 2024-09-24 PROCEDURE — 1159F MED LIST DOCD IN RCRD: CPT | Performed by: FAMILY MEDICINE

## 2024-09-24 RX ORDER — METHYLPREDNISOLONE 4 MG/1
TABLET ORAL
Qty: 21 TABLET | Refills: 0 | Status: SHIPPED | OUTPATIENT
Start: 2024-09-24 | End: 2024-10-01

## 2024-09-24 ASSESSMENT — PATIENT HEALTH QUESTIONNAIRE - PHQ9
SUM OF ALL RESPONSES TO PHQ9 QUESTIONS 1 AND 2: 0
2. FEELING DOWN, DEPRESSED OR HOPELESS: NOT AT ALL
1. LITTLE INTEREST OR PLEASURE IN DOING THINGS: NOT AT ALL

## 2024-09-24 NOTE — PROGRESS NOTES
I reviewed and examined the patient. I was present for the key exam elements, and I fully participated in the patient's care. I discussed the management of the care with the resident. I have personally reviewed the pertinent labs and imaging, as well as recent notes, with the patient. I have reviewed the note above and agree with the resident's medical decision making as documented in the resident's note, in addition to the following comments / findings:     Agree with the rest of the plan outlined below by resident physician. No red flags.      The patient understands and agrees to the assessment and plan of care. Patient has also agreed to follow up and comply with the treatment and evaluation as recommended today. Patient was instructed to call the office at [phone] should questions arise regarding their treatment or care.     Omer Gar DO, FAOASM  Family Medicine   [address], [address]  [place] [address]     Omer Gar DO

## 2024-09-24 NOTE — PROGRESS NOTES
"Subjective   Patient ID: Luis Manuel Avitia is a 73 y.o. male who presents for Hand Pain (-right hand).  HPI  He is 73 year old man who presented today to the office for hand pain for months. The pain is located in the dorsal part of the right hand. Pain is 5-6 out of 10 in severity. Pain is worse with lifting. No numbness or tingling of the fingers. No hx of trauma.   No change in color but the pain got worse over time.   He has apply voltare gel for the pain with some relive but has a skin irritation from the voltare gel.   Patient has had different joint pain in the past for which he had surgery.     Denies new onset headaches, fever, chills, n/v/d, chest pain, SOB, abdominal pain, urinary symptoms, and lower extremity edema.     Review of Systems  All other systems have been reviewed and are negative.    Visit Vitals  /82   Pulse 64   Temp 37.1 °C (98.7 °F)   Ht 1.727 m (5' 8\")   Wt 99.8 kg (220 lb)   SpO2 99%   BMI 33.45 kg/m²   Smoking Status Former   BSA 2.19 m²       Objective   Physical Exam  General: Alert and oriented. Appears well-nourished and in no acute distress.  Eyes: PERRLA. EOMI.  Head/neck: Normocephalic. Supple.  Lymphatics: No cervical lymphadenopathy.  Respiratory/Thorax: Clear to auscultation bilaterally. No wheezing.   Cardiovascular: Regular rate and rhythm. Positive for murmurs, 2/6 in severity.  Gastrointestinal: Soft, nontender, nondistended. +BS   Musculoskeletal:  right ROM intact. Slightly swelling. Normal strength. Pain with palpation.   Extremities: Warm and well perfused. No peripheral edema.  Neurological: No gross neurologic deficits.   Psychological: Appropriate mood and affect.   Skin: no discoloration but peeling of the skin is present.     Assessment/Plan   He is 73 year old man who came to the clinic for right hand pain which has been going on for months but got worse.   No injury or trauma.     # Right hand pain   -xray to exclude any fracture or lesion/ " tumor.  -discontinue voltaren gel due to skin problem.   - medrol dose pack  - if xray is normal will go with OT for the hand.     # heart murmur   -patient sees a cardiologist and was advised to follow with him.     No red flags. Follow up in  2 months for hand pain.     Problem List Items Addressed This Visit    None  Visit Diagnoses       Pain of right hand    -  Primary    Relevant Orders    XR hand right 3+ views            I have personally reviewed all available pertinent labs, imaging, and consult notes with the patient.     All questions and concerns were addressed. Patient verbalizes understanding instructions and agrees with established plan of care.     I discussed the plan with Dr.Leone Kirsten Escalante MD  Family medicine resident  PGY3

## 2024-09-25 ENCOUNTER — EVALUATION (OUTPATIENT)
Dept: PHYSICAL THERAPY | Facility: CLINIC | Age: 74
End: 2024-09-25
Payer: COMMERCIAL

## 2024-09-25 DIAGNOSIS — M25.552 LEFT HIP PAIN: Primary | ICD-10-CM

## 2024-09-25 PROCEDURE — 97110 THERAPEUTIC EXERCISES: CPT | Mod: GP

## 2024-09-25 PROCEDURE — 97161 PT EVAL LOW COMPLEX 20 MIN: CPT | Mod: GP

## 2024-09-25 NOTE — PROGRESS NOTES
Physical Therapy Evaluation     Patient Name: Luis Manuel Avitia  MRN: [id number]  Today's Date: 9/25/2024  Time Calculation  Start Time: 1104  Stop Time: 1156  Time Calculation (min): 52 min      Insurance:  Number of Treatments Authorized: 1/MN (MMO, 20.00 CO PAY, 100% COVERAGE, MED NEC, NO AUTH)          Current Problem:   1. Left hip pain  Follow Up In Physical Therapy          Precautions:  Precautions  Precautions Comment: No Fall risk    Reason for visit: L hip/glute pain  Referred by: Dr. [place]    Work, mechanical stresses: Desk work, some travel.   Leisure, mechanical stresses:  travel. Spend time with his wife.   Prior to onset the pt was able to complete all work/leisure/functional activities without limitation.  Functional disability from present episode/Primary goal for PT: limits to about  3/4 of a mile.    Present symptoms: 1/10 L Glute  Present since: July 2024 and unchanging - saw the surgeon and had xrays and was told every thing looks good.    Commenced as a result of no specific event but noticed it while walking - also notes that he pulled R HS in June while pushing on his heel to take of his shoe then ia few weeks later had a similar sensation on his L side while getting out a car.     Symptoms at onset: L glute  Constant symptoms: L Glute   Intermittent symptoms:   Pain ranges from: 1-4/10  Pt describes pain as: ache  Spine History: Yes LBP prior to the L hip replacement.  Had lumbar injections did not help, then tried a hip injection which helped.  Hip replaced 4 years ago which resolved all the sx.   Symptoms are worse with: walking- always, stairs - always, sleeping - uninterrupted not due to hip because of R hand carpal  Symptoms are better with:  lying down - always.  Continued use makes the pain: Worse  Other questions: Swelling, Clicking/locking, giving way/falling   Previous episodes: None  Previous treatments: None since replacement   Imaging: hip xrays on 9/17/24-  normal   Red Flags:  recent/major surgery - Shoulder 2023, night pain - no, accidents - no, unexplained weight loss - no    Objective Findings:  Outcome Measures:  Other Measures  Lower Extremity Funtional Score (LEFS): 49/80  Posture: Fair  Correction: NE    Hip ROM - Nil/Min/Mod/Max limit or degrees.   Flex: R = 115, L = 115 - ERP    Ext: R = 9, L = 9- ERP  IR: R = 20, L = 20 - ERP  ER:  R = 50, L = 45  MMT: hip  Flex: R = 5/5, L = 4+/5  IR: R = 5/5, L = 5/5  ER:  R = 5/5, L = 4-/5    Functional baselines/special tests:  R, L Slump test = Positive bilaterally for HS pain   Fwd step up/down with L LE - NE    Repeated Movement Testing -  During/after/mechanical response, Sx at baseline: 1/10 L glute  RFISitting x 15 - P Low back, NE on glute baelines  CRYSTAL over table 10 x 2  -  P back, NE on L glute  L hip ext on plinth 10 x 2 - stretch hip and back,     Treatment:   L hip ext semiloaded on plinth x 10  Educated pt on proper response to exercise, centralization/localization, produce/increase - NW principle, and assessment process.  Issued handout for HEP  HEP/med bridge:   L hip ext semiloaded on plinth/couch 10-20 reps x 4-5 a day    Assessment: Pt presents to PT with complaint of Left hip/glute pain that began in July 2024 [facility] but was walking when he mary carmen noticed the sx. Pt's history and response to repeated movements makes provisional classification L hip derangement with ext DP. Pt will continue to be assessed over the next 3-5 sessions to confirm provisional classification and DP. Pt will benefit from skilled PT to address ROM/strength/functional limitations and pain noted during evaluation today.    Plan of care:  Problem List: Pain, Functional limitations, activity limitations, ROM limitations, Posture, Gait, Transfer, Activity Limitations, IADLS/ADLS/Self care  Goals:  STG:  Pain = 0-2/10 L hip by week 4  Pt will report HEP compliance by week 1  Pt will be bale to amb > 1 mile without increase in L glute by week 4  Pt  will be able to go up/down full flight without L hip/glute pain by week 4  LTG:  L hip  ROM = R hip/nil limit in all directions without ERP by week  8  5/5 B LEs with MMTing by week 8  Pt will achieve a clinically significant improvement in LEFS by week 8  Pt will report >/= 80% improvement/progress towards PT goals by week 8  Pt will be able to amb any distance without L glute/hip pain by week 8  Planned interventions: Ther ex, Neuromuscular re-ed, ther act, Manual, Education, Home program, Estim, Hot therapy, Cold therapy, Vaso  The POC was created, discussed, and agreed on with the patient.

## 2024-09-26 DIAGNOSIS — R07.89 OTHER CHEST PAIN: ICD-10-CM

## 2024-09-26 DIAGNOSIS — E22.2 SYNDROME OF INAPPROPRIATE SECRETION OF ANTIDIURETIC HORMONE (MULTI): ICD-10-CM

## 2024-09-26 DIAGNOSIS — B36.9 FUNGAL DERMATITIS: ICD-10-CM

## 2024-09-26 DIAGNOSIS — I10 ESSENTIAL (PRIMARY) HYPERTENSION: ICD-10-CM

## 2024-09-26 RX ORDER — ATORVASTATIN CALCIUM 80 MG/1
80 TABLET, FILM COATED ORAL DAILY
Qty: 30 TABLET | Refills: 12 | Status: SHIPPED | OUTPATIENT
Start: 2024-09-26

## 2024-09-26 RX ORDER — KETOCONAZOLE 20 MG/G
CREAM TOPICAL 2 TIMES DAILY
Qty: 30 G | Refills: 0 | Status: SHIPPED | OUTPATIENT
Start: 2024-09-26 | End: 2025-09-26

## 2024-09-26 RX ORDER — METOPROLOL SUCCINATE 100 MG/1
100 TABLET, EXTENDED RELEASE ORAL DAILY
Qty: 90 TABLET | Refills: 2 | Status: SHIPPED | OUTPATIENT
Start: 2024-09-26

## 2024-09-26 RX ORDER — SODIUM CHLORIDE 1 G/1
1 TABLET ORAL 2 TIMES DAILY
Qty: 60 TABLET | Refills: 3 | Status: SHIPPED | OUTPATIENT
Start: 2024-09-26

## 2024-09-26 NOTE — TELEPHONE ENCOUNTER
Could you please pend this to doc?  I just saw that doc recently sent this in for the patient.  I thought it was a while ago

## 2024-09-26 NOTE — TELEPHONE ENCOUNTER
Patient called stating that you had written him a prescription for Ketoconazole cream a while ago.  He stated that he has a bit of an itchy rash and would like to know if you would send over a script for him.    JERMAINE Brewster

## 2024-09-30 ENCOUNTER — APPOINTMENT (OUTPATIENT)
Dept: ORTHOPEDIC SURGERY | Facility: CLINIC | Age: 74
End: 2024-09-30
Payer: COMMERCIAL

## 2024-09-30 VITALS — HEIGHT: 68 IN | BODY MASS INDEX: 33.34 KG/M2 | WEIGHT: 220 LBS

## 2024-09-30 DIAGNOSIS — G56.01 CARPAL TUNNEL SYNDROME OF RIGHT WRIST: Primary | ICD-10-CM

## 2024-09-30 PROCEDURE — 1159F MED LIST DOCD IN RCRD: CPT | Performed by: ORTHOPAEDIC SURGERY

## 2024-09-30 PROCEDURE — 20526 THER INJECTION CARP TUNNEL: CPT | Performed by: ORTHOPAEDIC SURGERY

## 2024-09-30 PROCEDURE — 3008F BODY MASS INDEX DOCD: CPT | Performed by: ORTHOPAEDIC SURGERY

## 2024-09-30 PROCEDURE — 99214 OFFICE O/P EST MOD 30 MIN: CPT | Performed by: ORTHOPAEDIC SURGERY

## 2024-09-30 PROCEDURE — 1036F TOBACCO NON-USER: CPT | Performed by: ORTHOPAEDIC SURGERY

## 2024-09-30 RX ORDER — TRIAMCINOLONE ACETONIDE 40 MG/ML
20 INJECTION, SUSPENSION INTRA-ARTICULAR; INTRAMUSCULAR
Status: COMPLETED | OUTPATIENT
Start: 2024-09-30 | End: 2024-09-30

## 2024-09-30 ASSESSMENT — PAIN DESCRIPTION - DESCRIPTORS: DESCRIPTORS: ACHING;SORE

## 2024-09-30 ASSESSMENT — PAIN SCALES - GENERAL: PAINLEVEL_OUTOF10: 5 - MODERATE PAIN

## 2024-09-30 ASSESSMENT — PAIN - FUNCTIONAL ASSESSMENT: PAIN_FUNCTIONAL_ASSESSMENT: 0-10

## 2024-09-30 NOTE — PROGRESS NOTES
Today to follow-up on his right hand.  He has the carpal tunnel syndrome which has responded temporarily to steroid injections.  Most recent injection was June 20.  He is having significant sleep disturbances because of the pain and burning.  He wears a wrist brace at nighttime while sleeping.  Today he requests a repeat injection and also wants to schedule his carpal tunnel release before the end of the year.    Past medical history, medications, allergies, surgical history and review of systems have been reviewed with the patient. Pertinent changes are documented in the HPI. Otherwise they are unchanged when compared to last visit on June 20, 2024.    Physical Examination Findings:  Constitutional: Appears well-developed and well-nourished.  Head: Normocephalic and atraumatic.  Eyes: Pupils are equal and round.  Cardiovascular: Intact distal pulses.   Respiratory: Effort normal. No respiratory distress.  Neurologic: Alert and oriented to person, place, and time.  Skin: Skin is warm and dry.  Hematologic / Lymphatic: No lymphedema, lymphangitis.  Psychiatric: normal mood and affect. Behavior is normal.   Musculoskeletal: Right hand examination reveals normal appearance.  No thenar atrophy.  Positive Durkan median nerve compression test.  Diminished sensation median nerve distribution.  Full finger flexion without triggering.    Impression: Right carpal tunnel syndrome.    Plan: Steroid injection performed today at patient request.  We have also discussed in detail the surgery and anticipated recovery.    Hand / UE Inj/Asp: R carpal tunnel for carpal tunnel syndrome on 9/30/2024 3:55 PM  Indications: pain and therapeutic  Details: 25 G needle, volar approach  Medications: 20 mg triamcinolone acetonide 40 mg/mL  Outcome: tolerated well, no immediate complications  Procedure, treatment alternatives, risks and benefits explained, specific risks discussed. Consent was given by the patient. Immediately prior to procedure  a time out was called to verify the correct patient, procedure, equipment, support staff and site/side marked as required. Patient was prepped and draped in the usual sterile fashion.         We discussed risks, benefits, alternatives and anticipated post op course including time away from work and activities following surgical treatment for the patient's condition. This is major surgery with identifiable risks. No guarantees for success have been provided. The patient has expressed understanding and has elected to pursue operative treatment.        For Surgical Planning:  Diagnosis: Right carpal tunnel syndrome  Procedure: Right carpal tunnel release  CPT: 80043  Anesthesia: Local only  Duration: 25 minutes  Special Equipment Needed: None  Medical Notes / PM / DM / PAT needed?:  N/A  Location: [place] or [facility]  Initial Post Operative Visit: 2 weeks    Krishan Valente MD    [employer]  Department of Orthopaedic Surgery  Chief of Hand and Upper Extremity Surgery  [facility]    Dictation performed with the use of voice recognition software. Syntax and grammatical errors may exist.

## 2024-10-02 ENCOUNTER — HOSPITAL ENCOUNTER (OUTPATIENT)
Dept: RADIOLOGY | Facility: HOSPITAL | Age: 74
Discharge: HOME | End: 2024-10-02
Payer: COMMERCIAL

## 2024-10-02 ENCOUNTER — TREATMENT (OUTPATIENT)
Dept: PHYSICAL THERAPY | Facility: CLINIC | Age: 74
End: 2024-10-02
Payer: COMMERCIAL

## 2024-10-02 DIAGNOSIS — M79.641 PAIN OF RIGHT HAND: ICD-10-CM

## 2024-10-02 DIAGNOSIS — M25.552 LEFT HIP PAIN: ICD-10-CM

## 2024-10-02 PROCEDURE — 97110 THERAPEUTIC EXERCISES: CPT | Mod: GP

## 2024-10-02 PROCEDURE — 73130 X-RAY EXAM OF HAND: CPT | Mod: RT

## 2024-10-02 NOTE — PROGRESS NOTES
Physical Therapy  Physical Therapy Treatment Note    Patient Name: Luis Manuel Avitia  MRN: [id number]  Today's Date: 10/2/2024  Time Calculation  Start Time: 1210  Stop Time: 1244  Time Calculation (min): 34 min    Insurance:  Number of Treatments Authorized: 2/MN (MMO, 20.00 CO PAY, 100% COVERAGE, MED NEC, NO AUTH)        Current Problem  1. Left hip pain  Follow Up In Physical Therapy          Precautions  Precautions  Precautions Comment: No Fall risk    Subjective   General       Patient reports:  Not sure if he was doing the exercise properly and did not feel much.  Was able to walk 1.75 miles without much pain during the walk or the next day    Performing HEP?: Partially x 3 day after eval but stopped.   L hip ext, and other stretches.      Pain   L glute feel stiff    Objective   L hip Flex/ext/ ER/IR = nil limit   L fwd step up/down       Treatments:  Therex:   Left rot in supine with hip IR for glute stretch 30 sec x 3   Long sitting R HS stretch 30 sec x 3 - increase L Glute, NW  Left Hip ext/runners stretch  x 30 - increase, NW, better with step up/down    Reviewed L hip ext semiloaded on plinth x 5  Sit to stand from raised plinth x 10  Standing at plinth:  R, L hip abd x 10 each LE   R, L hip hinge x 10 each LE  R, L Hip flexion x 10 each LE     OP EDUCATION:   Date: 10/02/2024  Prepared by: Krunal Bazan  Exercises  - Sit to Stand with Arms Crossed  - 1 x daily - 3-4 x weekly - 2-3 sets - 10-20 reps  - Standing Hip Abduction with Counter Support  - 1 x daily - 3-4 x weekly - 2-3 sets - 10-20 reps  - Standing Hip Extension with Counter Support  - 1 x daily - 3-4 x weekly - 2-3 sets - 10-20 reps  - Standing Hip Flexion with Counter Support  - 1 x daily - 3-4 x weekly - 2-3 sets - 10-20 reps    Assessment:  Pt has progressed well since initial eval.  He had been performing hip procedures in multiple directions in addition to hip ext, unable to confirm DP.  Pt tolerated the sesison without c/o of increased  hip pain and felt good leaving PT.        Plan:  F/U with increase frequency of hip ext stretch in standing.    F/U with generalized strengthening  Reasses fi needed.    OP PT Plan  Number of Treatments Authorized: 2/MN (MMO, 20.00 CO PAY, 100% COVERAGE, MED NEC, NO AUTH)    Goals:       Krunal Bazan, PT

## 2024-10-07 ENCOUNTER — TELEPHONE (OUTPATIENT)
Dept: PRIMARY CARE | Facility: CLINIC | Age: 74
End: 2024-10-07
Payer: COMMERCIAL

## 2024-10-07 NOTE — TELEPHONE ENCOUNTER
Luis Manuel called and stated that the results of his xray is in and he would like for you to please review it and call him and let him know what he needs to do going forward.

## 2024-10-08 ENCOUNTER — APPOINTMENT (OUTPATIENT)
Dept: PHYSICAL THERAPY | Facility: CLINIC | Age: 74
End: 2024-10-08

## 2024-10-08 ENCOUNTER — APPOINTMENT (OUTPATIENT)
Dept: PHYSICAL THERAPY | Facility: CLINIC | Age: 74
End: 2024-10-08
Payer: MEDICARE

## 2024-10-13 NOTE — PROGRESS NOTES
[facility]  Hand and Upper Extremity Service  Follow up visit         Follow up for: Right hand     Interval History: He returns for his right hand. He received a right carpal tunnel injection on last visit. He is scheduled for a right carpal tunnel release in the beginning of December but he presents today because he forgot to mention that he had a painful bump that developed on the dorsal aspect of his right hand. It interferes with hand shaking and is sensitive to touch. He mentioned this to his primary care provider who took x-rays revealing arthritis in the wrist. He localizes the pain and swelling dorsally between the index and metacarpal necks. The degree of swelling fluctuates throughout the day.               Past medical history, medications, allergies, surgical history and review of systems are reviewed and otherwise unchanged when compared to last visit on 9/30/24         Examination:  Constitutional: Oriented to person, place, and time.  Appears well-developed and well-nourished.  Head: Normocephalic and atraumatic.  Eyes: Pupils are equal, round, and reactive to light.  Cardiovascular: Intact distal pulses.  Pulmonary/Chest/Breast: Effort normal. No respiratory distress.  Neurological: Alert and oriented to person, place, and time.  Skin: Skin is warm and dry.  Psychiatric: normal mood and affect.  Behavior is normal.  Musculoskeletal: Right hand reveals evidence of soft tissue swelling that appears to be relatively subcutaneous to middle finger metacarpal neck. Blue-purple discoloration to it suggestive of vascular origin. No tenderness to palpation over the wrist joint. Still has numbness in thumb and index finger.       Personal Interpretation of Diagnostic studies: X-rays of right hand obtained on 9/24 demonstrate mild metacarpal joint arthritis but no other significant abnormalities.        Impression:   Right carpal tunnel syndrome   Right hand soft tissue  mass      Plan: The mass is most likely a benign condition and given the fluctuation in size and blue discoloration, may be a dilated vascular structure underneath the skin. He is scheduled for right carpal tunnel release on 12/6 and we plan on excision of this mass at this time. In the meantime, he'll continue with his activity modifications, bracing, and over the counter medications.     Follow up: As scheduled for surgery      For Surgical Planning:  Diagnosis: Right carpal tunnel syndrome, right hand soft tissue mass  Procedure: right carpal tunnel release, soft tissue mass excision  CPT: 81895, [id number]  Anesthesia: MAC  Duration: 30 minutes  Special Equipment Needed: none  Medical Notes / PM / DM / PAT needed?: PAT requested  [place] [facility]  Initial Post Operative Visit: 2 weeks          Krishan Valente MD  [facility]  Department of Orthopaedic Surgery  Hand and Upper Extremity Reconstruction    Scribe Attestation  By signing my name below, IVale , Scribe   attest that this documentation has been prepared under the direction and in the presence of Dr. Krishan Valente.    Dictation performed with the use of voice recognition software.  Syntax and grammatical errors may exist.

## 2024-10-15 ENCOUNTER — OFFICE VISIT (OUTPATIENT)
Dept: ORTHOPEDIC SURGERY | Facility: HOSPITAL | Age: 74
End: 2024-10-15
Payer: COMMERCIAL

## 2024-10-15 VITALS — HEIGHT: 68 IN | WEIGHT: 220 LBS | BODY MASS INDEX: 33.34 KG/M2

## 2024-10-15 DIAGNOSIS — G56.01 CARPAL TUNNEL SYNDROME OF RIGHT WRIST: Primary | ICD-10-CM

## 2024-10-15 DIAGNOSIS — M79.89 MASS OF SOFT TISSUE OF HAND: ICD-10-CM

## 2024-10-15 PROCEDURE — 99213 OFFICE O/P EST LOW 20 MIN: CPT | Performed by: ORTHOPAEDIC SURGERY

## 2024-10-18 ENCOUNTER — TREATMENT (OUTPATIENT)
Dept: PHYSICAL THERAPY | Facility: CLINIC | Age: 74
End: 2024-10-18
Payer: COMMERCIAL

## 2024-10-18 DIAGNOSIS — M25.552 LEFT HIP PAIN: ICD-10-CM

## 2024-10-18 PROCEDURE — 97110 THERAPEUTIC EXERCISES: CPT | Mod: GP

## 2024-10-18 NOTE — PROGRESS NOTES
Physical Therapy  Physical Therapy Treatment Note    Patient Name: Luis Manuel Avitia  MRN: [id number]  Today's Date: 10/18/2024  Time Calculation  Start Time: 0834  Stop Time: 0914  Time Calculation (min): 40 min    Insurance:  Number of Treatments Authorized: 4/MN (MMO, 20.00 CO PAY, 100% COVERAGE, MED NEC, NO AUTH)        Current Problem  1. Left hip pain  Follow Up In Physical Therapy          Precautions  Precautions  Precautions Comment: No Fall risk    Subjective   General       Patient reports:  Overall things are about the same. Not sure if things can get better with the hip.  Cut down the distance on the hip.  Saw the surgeon was told that things look fine/.  Laying on his either side makes it hurt.  Cut back on walking distance to 1 mile because any longer could bother him. Going up stairs can bother it as well, makes it feel weak.  Things seem pretty consistent.      Performing HEP?: Partially   L hip ext with hands on wall, and other stretches.      Pain   Mild left glute pain    Objective   L hip IR = min limit  L LENARD = positive   MMT L hip abd = 4-/5    L fwd step up/down    Treatments:  Manual:   L/S ext mobs  - Ne, Ne  Therex:   L hip ext /runners stretch x 15  L hip ext with IR on Chair x 10 - NE   L hip ext/runners stretch with IR on wall x 10 - Increase, NW, W with LENARD   HL L hip ER x 10 - P, NW, better with LENARD  L HIP ER x 10, x 1 min sustained - Increase, NW, better with amb and ROM check     OP EDUCATION:   Date: 10/18/2024  Exercises  - Supine Hip External Rotation  - 4-5 x daily - 7 x weekly - 1 sets - 10-20 reps  - Standing Hip Internal and External Rotation AAROM on Stool  - 4-5 x daily - 7 x weekly - 1 sets - 10-20 reps    Date: 10/02/2024  Prepared by: Krunal Bazan  Exercises  - Sit to Stand with Arms Crossed  - 1 x daily - 3-4 x weekly - 2-3 sets - 10-20 reps  - Standing Hip Abduction with Counter Support  - 1 x daily - 3-4 x weekly - 2-3 sets - 10-20 reps  - Standing Hip Extension  with Counter Support  - 1 x daily - 3-4 x weekly - 2-3 sets - 10-20 reps  - Standing Hip Flexion with Counter Support  - 1 x daily - 3-4 x weekly - 2-3 sets - 10-20 reps    Assessment:  Pt's progress has plateau 'd.  Reassess today, progressed hip ext forces and addition of IR  worsened baseleins.  Hyip ER improved baselines.  Hep was updated and pt demo'd understanding.        Plan:  F/U with hip ER  Reassess if needed   Progress function as tolerated     OP PT Plan  Number of Treatments Authorized: 4/MN (MMO, 20.00 CO PAY, 100% COVERAGE, MED NEC, NO AUTH)       Krunal Bazan, PT

## 2024-10-23 ENCOUNTER — TREATMENT (OUTPATIENT)
Dept: PHYSICAL THERAPY | Facility: CLINIC | Age: 74
End: 2024-10-23
Payer: MEDICARE

## 2024-10-23 DIAGNOSIS — M25.552 LEFT HIP PAIN: ICD-10-CM

## 2024-10-23 PROCEDURE — 97110 THERAPEUTIC EXERCISES: CPT | Mod: GP

## 2024-10-23 NOTE — PROGRESS NOTES
Physical Therapy  Physical Therapy Treatment Note    Patient Name: Luis Manuel Avitia  MRN: [id number]  Today's Date: 10/23/2024  Time Calculation  Start Time: 1034  Stop Time: 1114  Time Calculation (min): 40 min    Insurance:  Number of Treatments Authorized: 5/MN (MMO, 20.00 CO PAY, 100% COVERAGE, MED NEC, NO AUTH)        Current Problem  1. Left hip pain  Follow Up In Physical Therapy          Precautions  Precautions  Precautions Comment: No Fall risk    Subjective   General   Patient reports:  Was achey for a few days after the last session.  Overall things are about the same.  If the does the stretches a lot it makes it achey.  Been doing a lot of tairs over the past few days which may be why its achey.         Performing HEP?: Yes   Supine L hip ER     Pain   Mild left glute pain    Objective   L hip flexion, IR = min limit - ERP   L LENARD = positive   L fwd step up - pain    Treatments:  Therex:   L hip ER in HL/bent knee fallout x 1 min - increased, NW  L knee IR in HL x 1 min   L hip IR on chiar x 2 min   Sustained Hip IR on chair 30 sec x 2 -   L SKTC 1 min x 2 - increase, NW, W with amb  L hip IR on chiar x 10  Sustained Hip IR on chair x 20 sec - increase, NW , better with amb   R/L lateral steps yellow loop 1 min x 2   Standing R, L hip fwd/back step yellow loop 1 min x 2 each LE      OP EDUCATION:   Date: 10/23/2024  Program Notes-Do the hip stretch on chair x 3-4 a day and before/after walking   Exercises  - Side Stepping with Resistance at Feet  - 1 x daily - 2-3 x weekly - 2-3 sets - 10-20 reps  - Diagonal Forward Stepping with Resistance Loop  - 1 x daily - 2-3 x weekly - 2-3 sets - 10-20 reps    Date: 10/18/2024  Exercises  - Supine Hip External Rotation  - 4-5 x daily - 7 x weekly - 1 sets - 10-20 reps  - Standing Hip Internal and External Rotation AAROM on Stool  - 4-5 x daily - 7 x weekly - 1 sets - 10-20 reps    Date: 10/02/2024  Prepared by: Krunal Bazan  Exercises  - Sit to Stand with Arms  Crossed  - 1 x daily - 3-4 x weekly - 2-3 sets - 10-20 reps  - Standing Hip Abduction with Counter Support  - 1 x daily - 3-4 x weekly - 2-3 sets - 10-20 reps  - Standing Hip Extension with Counter Support  - 1 x daily - 3-4 x weekly - 2-3 sets - 10-20 reps  - Standing Hip Flexion with Counter Support  - 1 x daily - 3-4 x weekly - 2-3 sets - 10-20 reps    Assessment:  Pt' had nil lasting changes in baselines with good HEP compliance.  He had a better response to IR forces as compared to previous session, HEP was updated. He tolerated strengthening exercises well, denied pain, reported fatigue.        Plan:  F/U with hip IR on chair   Reassess if needed   Progress function as tolerated   OP PT Plan  Number of Treatments Authorized: 5/MN (MMO, 20.00 CO PAY, 100% COVERAGE, MED NEC, NO AUTH)       Krunal Bazan, PT

## 2024-10-28 ENCOUNTER — LAB (OUTPATIENT)
Dept: LAB | Facility: LAB | Age: 74
End: 2024-10-28
Payer: COMMERCIAL

## 2024-10-28 DIAGNOSIS — E22.2 SIADH (SYNDROME OF INAPPROPRIATE ADH PRODUCTION) (MULTI): ICD-10-CM

## 2024-10-28 DIAGNOSIS — R60.0 LOCALIZED EDEMA: ICD-10-CM

## 2024-10-28 DIAGNOSIS — E87.1 HYPONATREMIA: ICD-10-CM

## 2024-10-28 DIAGNOSIS — I10 PRIMARY HYPERTENSION: ICD-10-CM

## 2024-10-28 LAB
ALBUMIN SERPL BCP-MCNC: 3.9 G/DL (ref 3.4–5)
ANION GAP SERPL CALC-SCNC: 12 MMOL/L (ref 10–20)
BUN SERPL-MCNC: 15 MG/DL (ref 6–23)
CALCIUM SERPL-MCNC: 9 MG/DL (ref 8.6–10.3)
CHLORIDE SERPL-SCNC: 94 MMOL/L (ref 98–107)
CO2 SERPL-SCNC: 30 MMOL/L (ref 21–32)
CREAT SERPL-MCNC: 0.75 MG/DL (ref 0.5–1.3)
EGFRCR SERPLBLD CKD-EPI 2021: >90 ML/MIN/1.73M*2
GLUCOSE SERPL-MCNC: 101 MG/DL (ref 74–99)
PHOSPHATE SERPL-MCNC: 2.9 MG/DL (ref 2.5–4.9)
POTASSIUM SERPL-SCNC: 4.5 MMOL/L (ref 3.5–5.3)
SODIUM SERPL-SCNC: 131 MMOL/L (ref 136–145)

## 2024-10-28 PROCEDURE — 82570 ASSAY OF URINE CREATININE: CPT

## 2024-10-28 PROCEDURE — 83935 ASSAY OF URINE OSMOLALITY: CPT

## 2024-10-28 PROCEDURE — 84133 ASSAY OF URINE POTASSIUM: CPT

## 2024-10-28 PROCEDURE — 84300 ASSAY OF URINE SODIUM: CPT

## 2024-10-28 PROCEDURE — 80069 RENAL FUNCTION PANEL: CPT

## 2024-10-28 PROCEDURE — 36415 COLL VENOUS BLD VENIPUNCTURE: CPT

## 2024-10-28 PROCEDURE — 82436 ASSAY OF URINE CHLORIDE: CPT

## 2024-10-28 PROCEDURE — 83930 ASSAY OF BLOOD OSMOLALITY: CPT

## 2024-10-29 LAB
CHLORIDE UR-SCNC: 105 MMOL/L
CHLORIDE/CREATININE (MMOL/G) IN URINE: 200 MMOL/G CREAT (ref 23–275)
CREAT UR-MCNC: 52.5 MG/DL (ref 20–370)
OSMOLALITY SERPL: 278 MOSM/KG (ref 280–300)
OSMOLALITY UR: 458 MOSM/KG (ref 200–1200)
POTASSIUM UR-SCNC: 46 MMOL/L
POTASSIUM/CREAT UR-RTO: 88 MMOL/G CREAT
SODIUM UR-SCNC: 82 MMOL/L
SODIUM/CREAT UR-RTO: 156 MMOL/G CREAT

## 2024-10-30 ENCOUNTER — APPOINTMENT (OUTPATIENT)
Dept: NEPHROLOGY | Facility: CLINIC | Age: 74
End: 2024-10-30
Payer: COMMERCIAL

## 2024-10-30 VITALS
SYSTOLIC BLOOD PRESSURE: 125 MMHG | TEMPERATURE: 97.8 F | OXYGEN SATURATION: 98 % | HEART RATE: 61 BPM | DIASTOLIC BLOOD PRESSURE: 81 MMHG | WEIGHT: 220 LBS | BODY MASS INDEX: 33.34 KG/M2 | RESPIRATION RATE: 16 BRPM | HEIGHT: 68 IN

## 2024-10-30 DIAGNOSIS — E22.2 SIADH (SYNDROME OF INAPPROPRIATE ADH PRODUCTION) (MULTI): ICD-10-CM

## 2024-10-30 DIAGNOSIS — R60.0 LOCALIZED EDEMA: ICD-10-CM

## 2024-10-30 DIAGNOSIS — I10 PRIMARY HYPERTENSION: ICD-10-CM

## 2024-10-30 DIAGNOSIS — E87.1 HYPONATREMIA: Primary | ICD-10-CM

## 2024-10-30 PROCEDURE — 3074F SYST BP LT 130 MM HG: CPT | Performed by: INTERNAL MEDICINE

## 2024-10-30 PROCEDURE — 1159F MED LIST DOCD IN RCRD: CPT | Performed by: INTERNAL MEDICINE

## 2024-10-30 PROCEDURE — 3079F DIAST BP 80-89 MM HG: CPT | Performed by: INTERNAL MEDICINE

## 2024-10-30 PROCEDURE — 99214 OFFICE O/P EST MOD 30 MIN: CPT | Performed by: INTERNAL MEDICINE

## 2024-10-30 PROCEDURE — 3008F BODY MASS INDEX DOCD: CPT | Performed by: INTERNAL MEDICINE

## 2024-10-30 PROCEDURE — G2211 COMPLEX E/M VISIT ADD ON: HCPCS | Performed by: INTERNAL MEDICINE

## 2024-11-01 ENCOUNTER — APPOINTMENT (OUTPATIENT)
Dept: PHYSICAL THERAPY | Facility: CLINIC | Age: 74
End: 2024-11-01
Payer: COMMERCIAL

## 2024-11-08 ENCOUNTER — TREATMENT (OUTPATIENT)
Dept: PHYSICAL THERAPY | Facility: CLINIC | Age: 74
End: 2024-11-08
Payer: COMMERCIAL

## 2024-11-08 DIAGNOSIS — M25.552 LEFT HIP PAIN: ICD-10-CM

## 2024-11-08 PROCEDURE — 97110 THERAPEUTIC EXERCISES: CPT | Mod: GP

## 2024-11-08 NOTE — PROGRESS NOTES
Physical Therapy  Physical Therapy Treatment Note    Patient Name: Luis Manuel Avitia  MRN: [id number]  Today's Date: 11/8/2024  Time Calculation  Start Time: 1049  Stop Time: 1117  Time Calculation (min): 28 min    Insurance:  Number of Treatments Authorized: 6/MN (MMO, 20.00 CO PAY, 100% COVERAGE, MED NEC, NO AUTH)        Current Problem  1. Left hip pain  Follow Up In Physical Therapy          Precautions  Precautions  Precautions Comment: No Fall risk    Subjective   General   Patient reports:  Was ab;e to walk x 3 this week and it went well.  And doing stretches more often.  Was hurting for a few days after last session. Would like today to be his last PT session.  More concerned with losing weight and doing more cardio for his heart health.     Performing HEP?: Yes   Supine L hip ER     Pain   Mild left glute pain    Objective       Treatments:  Therex:   R, L glute/piriformis stretch with rotation x 30 sec each LE   On floor sitting HS stretch with twist and alt r/L  x 1 min   Alt R/L HF stretch in lunge x 5 each LE   R/L lateral steps with yellow loop x 40ft each direction  Fwd diagonal steps with yellow loop x 40ft each direction  Fwd monster walks with yellow loop x 40ft each direction  B heel raises x 1 min   B G/S stretch x 1 min     OP EDUCATION:   11/8/24: educated on expected timeline for hip pain to resolve and when to reach out to MD/PT    Date: 10/23/2024  Program Notes-Do the hip stretch on chair x 3-4 a day and before/after walking   Exercises  - Side Stepping with Resistance at Feet  - 1 x daily - 2-3 x weekly - 2-3 sets - 10-20 reps  - Diagonal Forward Stepping with Resistance Loop  - 1 x daily - 2-3 x weekly - 2-3 sets - 10-20 reps    Date: 10/18/2024  Exercises  - Supine Hip External Rotation  - 4-5 x daily - 7 x weekly - 1 sets - 10-20 reps  - Standing Hip Internal and External Rotation AAROM on Stool  - 4-5 x daily - 7 x weekly - 1 sets - 10-20 reps    Date: 10/02/2024  Prepared by: Krunal  Beni  Exercises  - Sit to Stand with Arms Crossed  - 1 x daily - 3-4 x weekly - 2-3 sets - 10-20 reps  - Standing Hip Abduction with Counter Support  - 1 x daily - 3-4 x weekly - 2-3 sets - 10-20 reps  - Standing Hip Extension with Counter Support  - 1 x daily - 3-4 x weekly - 2-3 sets - 10-20 reps  - Standing Hip Flexion with Counter Support  - 1 x daily - 3-4 x weekly - 2-3 sets - 10-20 reps    Assessment:  Pt is slowly progressing.  He would like to continue with HEP independent at this time.  Tolerated the session well, no exercises was limited by pain just fatigue.       Plan:  Discharge  OP PT Plan  Number of Treatments Authorized: 6/MN (MMO, 20.00 CO PAY, 100% COVERAGE, MED NEC, NO AUTH)       Krunal Bazan, PT

## 2024-11-27 DIAGNOSIS — G56.01 CARPAL TUNNEL SYNDROME ON RIGHT: Primary | ICD-10-CM

## 2024-12-06 ENCOUNTER — HOSPITAL ENCOUNTER (OUTPATIENT)
Facility: CLINIC | Age: 74
Setting detail: OUTPATIENT SURGERY
Discharge: HOME | End: 2024-12-06
Attending: ORTHOPAEDIC SURGERY | Admitting: ORTHOPAEDIC SURGERY
Payer: COMMERCIAL

## 2024-12-06 VITALS
OXYGEN SATURATION: 98 % | SYSTOLIC BLOOD PRESSURE: 166 MMHG | TEMPERATURE: 97.3 F | HEIGHT: 68 IN | BODY MASS INDEX: 33.65 KG/M2 | DIASTOLIC BLOOD PRESSURE: 89 MMHG | HEART RATE: 56 BPM | RESPIRATION RATE: 16 BRPM | WEIGHT: 222 LBS

## 2024-12-06 DIAGNOSIS — G56.01 CARPAL TUNNEL SYNDROME ON RIGHT: Primary | ICD-10-CM

## 2024-12-06 PROCEDURE — 88341 IMHCHEM/IMCYTCHM EA ADD ANTB: CPT | Performed by: STUDENT IN AN ORGANIZED HEALTH CARE EDUCATION/TRAINING PROGRAM

## 2024-12-06 PROCEDURE — 88307 TISSUE EXAM BY PATHOLOGIST: CPT | Mod: TC,SUR | Performed by: ORTHOPAEDIC SURGERY

## 2024-12-06 PROCEDURE — 3600000008 HC OR TIME - EACH INCREMENTAL 1 MINUTE - PROCEDURE LEVEL THREE: Performed by: ORTHOPAEDIC SURGERY

## 2024-12-06 PROCEDURE — 7100000009 HC PHASE TWO TIME - INITIAL BASE CHARGE: Performed by: ORTHOPAEDIC SURGERY

## 2024-12-06 PROCEDURE — 3600000003 HC OR TIME - INITIAL BASE CHARGE - PROCEDURE LEVEL THREE: Performed by: ORTHOPAEDIC SURGERY

## 2024-12-06 PROCEDURE — 88307 TISSUE EXAM BY PATHOLOGIST: CPT | Performed by: STUDENT IN AN ORGANIZED HEALTH CARE EDUCATION/TRAINING PROGRAM

## 2024-12-06 PROCEDURE — 26116 EXC HAND TUM DEEP < 1.5 CM: CPT | Performed by: ORTHOPAEDIC SURGERY

## 2024-12-06 PROCEDURE — 7100000010 HC PHASE TWO TIME - EACH INCREMENTAL 1 MINUTE: Performed by: ORTHOPAEDIC SURGERY

## 2024-12-06 PROCEDURE — 88342 IMHCHEM/IMCYTCHM 1ST ANTB: CPT | Performed by: STUDENT IN AN ORGANIZED HEALTH CARE EDUCATION/TRAINING PROGRAM

## 2024-12-06 PROCEDURE — 2500000005 HC RX 250 GENERAL PHARMACY W/O HCPCS: Performed by: ORTHOPAEDIC SURGERY

## 2024-12-06 PROCEDURE — 64721 CARPAL TUNNEL SURGERY: CPT | Performed by: ORTHOPAEDIC SURGERY

## 2024-12-06 PROCEDURE — 2500000004 HC RX 250 GENERAL PHARMACY W/ HCPCS (ALT 636 FOR OP/ED): Performed by: ORTHOPAEDIC SURGERY

## 2024-12-06 RX ORDER — HYDROCODONE BITARTRATE AND ACETAMINOPHEN 5; 325 MG/1; MG/1
1 TABLET ORAL EVERY 6 HOURS PRN
Qty: 12 TABLET | Refills: 0 | Status: SHIPPED | OUTPATIENT
Start: 2024-12-06 | End: 2024-12-09

## 2024-12-06 RX ORDER — SODIUM CHLORIDE 0.9 G/100ML
IRRIGANT IRRIGATION AS NEEDED
Status: DISCONTINUED | OUTPATIENT
Start: 2024-12-06 | End: 2024-12-06 | Stop reason: HOSPADM

## 2024-12-06 ASSESSMENT — PAIN - FUNCTIONAL ASSESSMENT
PAIN_FUNCTIONAL_ASSESSMENT: 0-10

## 2024-12-06 ASSESSMENT — COLUMBIA-SUICIDE SEVERITY RATING SCALE - C-SSRS
2. HAVE YOU ACTUALLY HAD ANY THOUGHTS OF KILLING YOURSELF?: NO
1. IN THE PAST MONTH, HAVE YOU WISHED YOU WERE DEAD OR WISHED YOU COULD GO TO SLEEP AND NOT WAKE UP?: NO
6. HAVE YOU EVER DONE ANYTHING, STARTED TO DO ANYTHING, OR PREPARED TO DO ANYTHING TO END YOUR LIFE?: NO

## 2024-12-06 ASSESSMENT — PAIN SCALES - GENERAL
PAINLEVEL_OUTOF10: 3
PAINLEVEL_OUTOF10: 0 - NO PAIN
PAINLEVEL_OUTOF10: 0 - NO PAIN

## 2024-12-06 NOTE — H&P
History Of Present Illness  Luis Manuel Avitia is a 74 y.o. male presenting with right carpal tunnel syndrome and right hand soft tissue mass.     Past Medical History  Past Medical History:   Diagnosis Date    CAD (coronary artery disease)     status post CABG x2  with LIMA to LAD and saphenous vein graft to obtuse marginal branch of the LCx    Chronic hyponatremia     followed by nephrology, managed on meds; 9/7/23 [id number]    Encounter for other preprocedural examination 12/08/2020    Preoperative clearance    HLD (hyperlipidemia)     HTN (hypertension)     Lumbar herniated disc     OA (osteoarthritis)     JOHNATHAN on CPAP     Other intervertebral disc displacement, lumbar region 10/30/2020    Lumbar disc herniation    Personal history of other diseases of the circulatory system     History of hypertension    Personal history of other diseases of the nervous system and sense organs     History of obstructive sleep apnea    SIADH (syndrome of inappropriate ADH production) (Multi)        Surgical History  Past Surgical History:   Procedure Laterality Date    CARDIAC CATHETERIZATION  2022    KNEE SURGERY Right 05/11/2016    Knee Surgery    OTHER SURGICAL HISTORY  09/23/2022    Coronary artery bypass graft    OTHER SURGICAL HISTORY Left 12/17/2021    Hip surgery    SHOULDER SURGERY Right     TSR        Social History  He reports that he quit smoking about 44 years ago. His smoking use included cigarettes. He has been exposed to tobacco smoke. He has never used smokeless tobacco. He reports that he does not currently use alcohol after a past usage of about 5.0 standard drinks of alcohol per week. He reports that he does not use drugs.    Family History  Family History   Problem Relation Name Age of Onset    Other (high blood pressure) Mother      Lung cancer Father          Allergies  Patient has no known allergies.    Review of Systems   All other systems reviewed and are negative.       Physical Exam  Physical Examination  "Findings:  Constitutional: Appears well-developed and well-nourished.  Head: Normocephalic and atraumatic.  Eyes: Pupils are equal and round.  Cardiovascular: Intact distal pulses.   Respiratory: Effort normal. No respiratory distress.  Neurologic: Alert and oriented to person, place, and time.  Skin: Skin is warm and dry.  Hematologic / Lympahtic: No lymphedema, lymphangitis.  Psychiatric: normal mood and affect. Behavior is normal.   Musculoskeletal: Right hand diminished sensation median nerve distribution.  Soft tissue mass dorsal aspect hand    Last Recorded Vitals  Blood pressure 153/70, pulse 62, temperature 37 °C (98.6 °F), temperature source Temporal, resp. rate 16, height 1.727 m (5' 8\"), weight 101 kg (222 lb 0.1 oz), SpO2 100%.            Assessment/Plan   Assessment & Plan  Carpal tunnel syndrome on right      Will proceed with right carpal tunnel release and dorsal soft tissue mass excision as scheduled       Krishan Valente MD    "

## 2024-12-06 NOTE — DISCHARGE INSTRUCTIONS
Post-Operative Instructions  Dr. Krishan Valente [phone]    Dressing:  You have a bandage or splint covering your operative site.    You may remove the dressing in 4  days following surgery. Once your dressing is removed you may shower and/or wash your incision in a sink with soap and water. Do not soak your incision. No bath tubs, hot tubs or swimming pools. After washing the wound please pat the incision dry thoroughly. Please use mild soap. Please do not use hydrogen peroxide. Please cover the wound with a band-aid or gauze and change it daily. Please do not apply any salves, creams, lotions or ointments to the surgical incision. Otherwise keep incision clean and dry (no yard work, engine work, etc.)    Post Anesthesia Instructions:  If you received general anesthesia or IV sedation for your procedure for the next 24 hours: No driving, no drinking alcohol, no sleeping aids, no important decision making, and have an adult with you for 24 hours.    Showering:  You may shower following surgery but please adhere to above instructions regarding the dressing. If showering with bandage/splint in place please ensure that it is kept dry and covered while bathing. There are commercially available cast bags that can be used to protect your dressing while showering. If using garbage bags please make sure that there are no holes in the bag and that the bag has been sealed above the dressing. If the bandage gets wet you must contact your surgeon's office to make arrangements to be seen to have the bandage changed.     Ice/Elevation:  The application of ice to your surgical site after surgery will help with pain control and swelling. This can be very effective for 48-72 hours after surgery. Please be careful to avoid getting bandage wet from a leaky ice bag. Please be advised that the ice may need to be applied for longer periods of time for the cooling effect to penetrate the post-operative dressing.     Elevation of the  operative site above the level of the heart as much as possible for the first 48-72 hours after surgery. Use your sling if you have been provided one while standing or walking. If your fingers are not included in the dressing you are encouraged to attempt finger range of motion as this will help with your hand swelling and ultimate recovery.    Pain Medication:  If you received a regional anesthetic on the day of your surgery your arm and hand may be numb for up to 24 hours after surgery. It is important to wear your sling while the block is still effective in order to protect your arm. It is advisable to take pain medications prior to going to sleep in case the regional anesthesia medication wears off while you are sleeping.    Take your pain medications as directed. Narcotic pain medications can cause lethargy, nausea and constipation. Please contact your surgeon's office and discontinue the medication if these symptoms become problematic. Eating a regular diet, drinking fluids and walking can help with constipation from these medications. Avoid alcohol consumption and driving while taking narcotic pain medications.     Additional pain control options:     You are encouraged to take over the counter medications like Advil / Motrin / Ibuprofen / Aleve in addition to your prescribed pain medications after surgery.    Smoking/Tobacco:  Tobacco use is known to interfere with wound and fracture healing and increase post-operative pain. It can also increase your risk of poor outcomes following surgery. Please do not use tobacco or nicotine products following your surgery. This includes smokeless tobacco, or nicotine replacement products (patches, gum, etc.).    Driving:  It is not advisable to operate a vehicle while using narcotic pain medications. Additionally, please be advised that you are likely to have challenges operating a car or motorcycle while you are still in your postoperative dressing and this could  increase your risk of being involved in an accident and being cited for driving while physically impaired.     Warning Signs:  Observe your arm/hand and incision site (if visible) for increased redness, inflammation, drainage, odor or pain that is unrelieved by rest, elevation or medication. Please contact your surgeon's office immediately if you develop any of these issues or if you develop a fever greater than 101°.    Follow Up Appointments:  Your post-operative appointment has been scheduled for  12/23/24 at 9:15 am    [facility], [address] [place] [address] Rd., [place], [place], [address]

## 2024-12-06 NOTE — OP NOTE
Right carpal tunnel release, soft tissue mass excision / 30 Minutes (R) Operative Note     Date: 2024  OR Location: [facility] OR    Name: Luis Manuel Avitia, : 1950, Age: 74 y.o., MRN: [id number], Sex: male    Diagnosis  Pre-op Diagnosis      * Carpal tunnel syndrome on right [G56.01] Post-op Diagnosis     * Carpal tunnel syndrome on right [G56.01]     Procedures  Right carpal tunnel release  Right hand dorsal deep soft tissue mass excision      Surgeons      * Krishan Valente - Primary    Resident/Fellow/Other Assistant:  Surgeons and Role:  * No surgeons found with a matching role *    Staff:   Circulator: Jax Barajas Person: Gila Carbajal Circulator: Sarah    Anesthesia Staff: No anesthesia staff entered.    Procedure Summary  Anesthesia: Anesthesia type not filed in the log.  ASA: ASA status not filed in the log.  Estimated Blood Loss:  mL  Intra-op Medications: Administrations occurring from 1200 to 1245 on 24:  * No intraprocedure medications in log *        Specimen:   ID Type Source Tests Collected by Time   1 : SOFT TISSUE MASS RIGHT HAND Tissue SOFT TISSUE MASS BIOPSY SURGICAL PATHOLOGY EXAM Krishan Valente MD 2024 1142                 Drains and/or Catheters: * None in log *    Tourniquet Times:     Total Tourniquet Time Documented:  Arm - Upper (Right) - 16 minutes  Total: Arm - Upper (Right) - 16 minutes      Implants:     Findings: Right carpal tunnel syndrome, Vascular lesion dorsal aspect of the hand within the second dorsal interosseous muscle fascia    Indications: Luis Manuel Avitia is an 74 y.o. male who is having surgery for Carpal tunnel syndrome on right [G56.01].      The patient was seen in the preoperative area. The risks, benefits, complications, treatment options, non-operative alternatives, expected recovery and outcomes were discussed with the patient. The possibilities of reaction to medication, pulmonary aspiration, injury to surrounding structures, bleeding, recurrent  infection, the need for additional procedures, failure to diagnose a condition, and creating a complication requiring transfusion or operation were discussed with the patient. The patient concurred with the proposed plan, giving informed consent.  The site of surgery was properly noted/marked if necessary per policy. The patient has been actively warmed in preoperative area. Preoperative antibiotics are not indicated. Venous thrombosis prophylaxis are not indicated.    Procedure Details:   74-year-old gentleman presents today for right carpal tunnel decompression.  He also has a symptomatic palpable soft tissue mass between the index and middle finger metacarpal necks on the dorsal aspect of the hand that is very painful and he request excision of this lesion as well.  Both sites were identified and marked for surgery.  Informed consent process was completed.    Patient was brought to the operating room placed supine on the operating table.  Timeout procedure performed to verify correct patient, procedure and operative site.  Local anesthetic infiltrated the base of the right palm.  We then infiltrated local anesthetic over the dorsal radial aspect of the hand.  Right upper extremity was then prepped and draped in usual sterile fashion.  Limb was exsanguinated and tourniquet was inflated to 250 mmHg.    Beginning with the carpal tunnel we made a longitudinal incision in the central aspect of the proximal palm.  Sharp dissection was carried through the superficial palmar fascia.  Transverse carpal ligament was exposed and divided longitudinally along its ulnar margin.  Complete median nerve decompression was confirmed.  This wound was irrigated and then closed in interrupted fashion.    We turned our attention out of the soft tissue mass in the dorsal aspect of the hand.  We made a longitudinal incision directly over this palpable mass.  We dissected through the subcutaneous tissue.  Dorsal venous structures were  cauterized and divided.  We developed the interval between the extensor fraire's of the index and middle finger.  The lesion was palpated below the fascia of the dorsal aspect of the second dorsal interosseous muscle.  The fascia was incised and a round vascular lesion was identified that measured roughly 8 mm in diameter.  We mobilized the structure.  We found vessels proximally and distally leading into this lesion.  These vessels were cauterized.  The lesion was then excised completely.  Was placed into formalin and sent to pathology for tissue analysis.  No further remaining abnormal masses were identified.  This wound was irrigated and then closed in interrupted fashion.  A sterile bandage was applied and the tourniquet was deflated.  The patient was transferred to the recovery room in stable condition.    Postoperatively he will be discharged home once comfortable.  He can remove his bandage and postop day #4 and begin wound care with gentle activities as instructed.  Return to clinic in 2 weeks for wound check and advancement of activities.        Complications:  None; patient tolerated the procedure well.    Disposition: PACU - hemodynamically stable.  Condition: stable                 Additional Details:      Attending Attestation: I was present and scrubbed for the entire procedure.    Krishan Valente  Phone Number: [phone]

## 2024-12-09 ASSESSMENT — PAIN SCALES - GENERAL: PAINLEVEL_OUTOF10: 2

## 2024-12-19 LAB
LAB AP ASR DISCLAIMER: NORMAL
LABORATORY COMMENT REPORT: NORMAL
PATH REPORT.COMMENTS IMP SPEC: NORMAL
PATH REPORT.FINAL DX SPEC: NORMAL
PATH REPORT.GROSS SPEC: NORMAL
PATH REPORT.RELEVANT HX SPEC: NORMAL
PATH REPORT.TOTAL CANCER: NORMAL

## 2024-12-23 ENCOUNTER — OFFICE VISIT (OUTPATIENT)
Dept: ORTHOPEDIC SURGERY | Facility: CLINIC | Age: 74
End: 2024-12-23
Payer: COMMERCIAL

## 2024-12-23 VITALS — WEIGHT: 222 LBS | HEIGHT: 68 IN | BODY MASS INDEX: 33.65 KG/M2

## 2024-12-23 DIAGNOSIS — M79.89 MASS OF SOFT TISSUE OF HAND: ICD-10-CM

## 2024-12-23 DIAGNOSIS — G56.01 CARPAL TUNNEL SYNDROME OF RIGHT WRIST: Primary | ICD-10-CM

## 2024-12-23 PROCEDURE — 1159F MED LIST DOCD IN RCRD: CPT | Performed by: ORTHOPAEDIC SURGERY

## 2024-12-23 PROCEDURE — 1036F TOBACCO NON-USER: CPT | Performed by: ORTHOPAEDIC SURGERY

## 2024-12-23 PROCEDURE — 99024 POSTOP FOLLOW-UP VISIT: CPT | Performed by: ORTHOPAEDIC SURGERY

## 2024-12-23 ASSESSMENT — PAIN DESCRIPTION - DESCRIPTORS: DESCRIPTORS: TINGLING

## 2024-12-23 ASSESSMENT — PAIN - FUNCTIONAL ASSESSMENT: PAIN_FUNCTIONAL_ASSESSMENT: 0-10

## 2024-12-23 ASSESSMENT — PAIN SCALES - GENERAL: PAINLEVEL_OUTOF10: 5 - MODERATE PAIN

## 2024-12-23 NOTE — PROGRESS NOTES
First postoperative visit following right carpal tunnel release and right hand dorsal soft tissue mass excision.  Surgery performed December 6, 2024.  He reports that his dorsal hand pain is much improved following the excision of the tumor.  He had a lot of swelling in his palm after surgery and still has a fair amount of tenderness and weakness in the hand.  Still experiencing some numbness and tingling in the fingertips but no issues at nighttime while sleeping anymore.    Examination reveals healed palmar and dorsal hand incisions.  Mild residual swelling in the proximal palm extending into the distal forearm.  Full digital range of motion.  Thenar motor function intact.  Sensation intact to light touch but subjectively diminished in the fingertips.    Review of pathology report reveals myopoericytoma, a benign condition involving a vessel wall.    Impression: Benign vascular tumor, carpal tunnel syndrome.    Plan: At this point he can progressively increase the use of his hand as tolerated.  Anticipate continued improvement in sensation strength sensitivity in all hand function.  Hand lesion excised is benign and should not be a concern for potential recurrence or malignancy.  Scar massage and desensitization encouraged.  I am happy to see him again in the future if he is having further issues or concerns with his right hand.    Krishan Valente MD    [facility]  Department of Orthopaedic Surgery  Chief of Hand and Upper Extremity Surgery      Dictation performed with the use of voice recognition software. Syntax and grammatical errors may exist.

## 2024-12-23 NOTE — LETTER
December 23, 2024     Omer Gar DO  [address] [place] [id number]    Patient: Luis Manuel Avitia   YOB: 1950   Date of Visit: 12/23/2024       Dear Dr. Omer Gar DO:    Thank you for referring Luis Manuel Avitia to me for evaluation. Below are my notes for this consultation.  If you have questions, please do not hesitate to call me. I look forward to following your patient along with you.       Sincerely,     Krishan Valente MD      CC: No Recipients  ______________________________________________________________________________________    First postoperative visit following right carpal tunnel release and right hand dorsal soft tissue mass excision.  Surgery performed December 6, 2024.  He reports that his dorsal hand pain is much improved following the excision of the tumor.  He had a lot of swelling in his palm after surgery and still has a fair amount of tenderness and weakness in the hand.  Still experiencing some numbness and tingling in the fingertips but no issues at nighttime while sleeping anymore.    Examination reveals healed palmar and dorsal hand incisions.  Mild residual swelling in the proximal palm extending into the distal forearm.  Full digital range of motion.  Thenar motor function intact.  Sensation intact to light touch but subjectively diminished in the fingertips.    Review of pathology report reveals myopoericytoma, a benign condition involving a vessel wall.    Impression: Benign vascular tumor, carpal tunnel syndrome.    Plan: At this point he can progressively increase the use of his hand as tolerated.  Anticipate continued improvement in sensation strength sensitivity in all hand function.  Hand lesion excised is benign and should not be a concern for potential recurrence or malignancy.  Scar massage and desensitization encouraged.  I am happy to see him again in the future if he is having further issues or concerns with his right hand.    Krishan Valente,  MD    [facility]  Department of Orthopaedic Surgery  Chief of Hand and Upper Extremity Surgery  [facility]    Dictation performed with the use of voice recognition software. Syntax and grammatical errors may exist.

## 2024-12-24 ENCOUNTER — OFFICE VISIT (OUTPATIENT)
Dept: URGENT CARE | Age: 74
End: 2024-12-24
Payer: COMMERCIAL

## 2024-12-24 VITALS
WEIGHT: 210 LBS | BODY MASS INDEX: 31.93 KG/M2 | TEMPERATURE: 97.6 F | SYSTOLIC BLOOD PRESSURE: 139 MMHG | OXYGEN SATURATION: 97 % | HEART RATE: 65 BPM | DIASTOLIC BLOOD PRESSURE: 86 MMHG | RESPIRATION RATE: 16 BRPM

## 2024-12-24 DIAGNOSIS — R51.9 NONINTRACTABLE HEADACHE, UNSPECIFIED CHRONICITY PATTERN, UNSPECIFIED HEADACHE TYPE: Primary | ICD-10-CM

## 2024-12-24 LAB
POC RAPID INFLUENZA A: NEGATIVE
POC RAPID INFLUENZA B: NEGATIVE
POC SARS-COV-2 AG BINAX: NORMAL

## 2024-12-24 PROCEDURE — 99203 OFFICE O/P NEW LOW 30 MIN: CPT | Performed by: NURSE PRACTITIONER

## 2024-12-24 PROCEDURE — 87804 INFLUENZA ASSAY W/OPTIC: CPT | Performed by: NURSE PRACTITIONER

## 2024-12-24 PROCEDURE — 1036F TOBACCO NON-USER: CPT | Performed by: NURSE PRACTITIONER

## 2024-12-24 PROCEDURE — 3075F SYST BP GE 130 - 139MM HG: CPT | Performed by: NURSE PRACTITIONER

## 2024-12-24 PROCEDURE — 87811 SARS-COV-2 COVID19 W/OPTIC: CPT | Performed by: NURSE PRACTITIONER

## 2024-12-24 PROCEDURE — 3079F DIAST BP 80-89 MM HG: CPT | Performed by: NURSE PRACTITIONER

## 2024-12-24 PROCEDURE — 1159F MED LIST DOCD IN RCRD: CPT | Performed by: NURSE PRACTITIONER

## 2024-12-24 PROCEDURE — 1160F RVW MEDS BY RX/DR IN RCRD: CPT | Performed by: NURSE PRACTITIONER

## 2024-12-24 RX ORDER — AMOXICILLIN 500 MG/1
500 CAPSULE ORAL EVERY 12 HOURS SCHEDULED
Qty: 20 CAPSULE | Refills: 0 | Status: SHIPPED | OUTPATIENT
Start: 2024-12-24 | End: 2025-01-03

## 2024-12-24 ASSESSMENT — ENCOUNTER SYMPTOMS
CARDIOVASCULAR NEGATIVE: 1
CHILLS: 1
SORE THROAT: 0
RESPIRATORY NEGATIVE: 1
FATIGUE: 1
ACTIVITY CHANGE: 1
RHINORRHEA: 1
GASTROINTESTINAL NEGATIVE: 1
EYES NEGATIVE: 1
FEVER: 0

## 2024-12-24 NOTE — PROGRESS NOTES
Subjective   Patient ID: Luis Manuel Avitia is a 74 y.o. male. They present today with a chief complaint of Headache and Fatigue (2 days).    History of Present Illness  Patient presents with a complaint of a headache and fatigue for approximately 2 days. States he is just not feeling well. Complaints of congestion and running nose. States his wife has had it for four days and her doctor gave her antibiotic.     Past Medical History  Allergies as of 12/24/2024    (No Known Allergies)       (Not in a hospital admission)       Past Medical History:   Diagnosis Date    CAD (coronary artery disease)     status post CABG x2  with LIMA to LAD and saphenous vein graft to obtuse marginal branch of the LCx    Chronic hyponatremia     followed by nephrology, managed on meds; 9/7/23 BM=458    Encounter for other preprocedural examination 12/08/2020    Preoperative clearance    HLD (hyperlipidemia)     HTN (hypertension)     Lumbar herniated disc     OA (osteoarthritis)     JOHNATHAN on CPAP     Other intervertebral disc displacement, lumbar region 10/30/2020    Lumbar disc herniation    Personal history of other diseases of the circulatory system     History of hypertension    Personal history of other diseases of the nervous system and sense organs     History of obstructive sleep apnea    SIADH (syndrome of inappropriate ADH production) (Multi)        Past Surgical History:   Procedure Laterality Date    CARDIAC CATHETERIZATION  2022    KNEE SURGERY Right 05/11/2016    Knee Surgery    OTHER SURGICAL HISTORY  09/23/2022    Coronary artery bypass graft    OTHER SURGICAL HISTORY Left 12/17/2021    Hip surgery    SHOULDER SURGERY Right     TSR        reports that he quit smoking about 45 years ago. His smoking use included cigarettes. He has been exposed to tobacco smoke. He has never used smokeless tobacco. He reports that he does not currently use alcohol after a past usage of about 5.0 standard drinks of alcohol per week. He reports that  he does not use drugs.    Review of Systems  Review of Systems   Constitutional:  Positive for activity change, chills and fatigue. Negative for fever.   HENT:  Positive for congestion, postnasal drip and rhinorrhea. Negative for ear discharge, ear pain and sore throat.    Eyes: Negative.    Respiratory: Negative.     Cardiovascular: Negative.    Gastrointestinal: Negative.                                   Objective    Vitals:    12/24/24 0911   BP: 139/86   BP Location: Left arm   Patient Position: Sitting   BP Cuff Size: Large adult   Pulse: 65   Resp: 16   Temp: 36.4 °C (97.6 °F)   TempSrc: Oral   SpO2: 97%   Weight: 95.3 kg (210 lb)     No LMP for male patient.    Physical Exam  CONSTITUTIONAL: The general appearance and condition of the patient were examined.  Level of distress, nutrition, external development abnormality, and general behavior were noted.  No abnormal findings.  Vital signs as documented.      EYES: Patient examined for extra ocular movements, symmetry of pupils, and reactivity to light.        ENT: External ears: left ear normal ; right ear normal. Bilateral ears have bulging TM's.  Normal external ear exam.  Bilateral swelling and redness to nasal turbinate's.  Erythema with pebbling to throat.         Lymph: Bilateral cervical lymph edema     CARDIOVASCULAR: The patient's heart examined for regular rate and rhythm and presence of murmurs. Note taken of any tachycardia, bradycardia or any irregular rhythm.  No abnormal findings.        RESPIRATORY/LUNGS: Chest examined for equal movement, bilaterally.  Lungs examined for equality of breath sounds. Presence or absence of rales noted bilaterally.  Examined for the presence of diffuse or scattered wheezes.  No abnormal findings.      GI/ABDOMEN: The patient's abdomen was inspected for signs of distention, surgical scars, or ascites.  Bowel sounds were evaluated.  The patient's abdomen was then palpated in each of four quadrants, including  palpation for RLQ tenderness, any rebound or tenderness.  No abnormal findings.     Procedures    Point of Care Test & Imaging Results from this visit  No results found for this visit on 12/24/24.   No results found.    Diagnostic study results (if any) were reviewed by [facility] [place].    Assessment/Plan   Allergies, medications, history, and pertinent labs/EKGs/Imaging reviewed by MORENO White-CNP.     Medical Decision Making  At time of discharge patient was clinically well-appearing and HDS for outpatient management. The patient and/or family was educated regarding diagnosis, supportive care, OTC and Rx medications. The patient and/or family was given the opportunity to ask questions prior to discharge.  They verbalized understanding of my discussion of the plans for treatment, expected course, indications to return to  or seek further evaluation in ED, and the need for timely follow up as directed.   They were provided with a work/school excuse if requested.    COVID and Flu were negative.  Patient requesting antibiotics.    Orders and Diagnoses  There are no diagnoses linked to this encounter.    Medical Admin Record      Patient disposition: Home    Electronically signed by [facility]  9:19 AM

## 2024-12-26 ENCOUNTER — APPOINTMENT (OUTPATIENT)
Dept: AUDIOLOGY | Facility: CLINIC | Age: 74
End: 2024-12-26
Payer: COMMERCIAL

## 2024-12-26 ENCOUNTER — DOCUMENTATION (OUTPATIENT)
Dept: AUDIOLOGY | Facility: CLINIC | Age: 74
End: 2024-12-26

## 2024-12-26 DIAGNOSIS — H93.11 TINNITUS OF RIGHT EAR: ICD-10-CM

## 2024-12-26 DIAGNOSIS — H90.3 SENSORINEURAL HEARING LOSS (SNHL) OF BOTH EARS: Primary | ICD-10-CM

## 2024-12-26 PROCEDURE — 92567 TYMPANOMETRY: CPT | Performed by: AUDIOLOGIST

## 2024-12-26 PROCEDURE — 92557 COMPREHENSIVE HEARING TEST: CPT | Performed by: AUDIOLOGIST

## 2024-12-26 PROCEDURE — HRANC PR HEARING AID NO CHARGE: Performed by: AUDIOLOGIST

## 2024-12-26 NOTE — PROGRESS NOTES
Name:  Luis Manuel Avitia  :  1950  Age:  74 y.o.  Date of Service:  2024    Mr. Avitia is coming in for HAE. Insurance was called to check hearing aid benefits 2024.    Are Hearing aids a covered benefit? No, hearing aids are not covered by insurance (excluded benefit)

## 2024-12-26 NOTE — PROGRESS NOTES
HEARING AID EVALUATION    The patient was seen today for a brief hearing aid evaluation, as he was late for his appointment.  Discussed the various amplification options with the patient, including the various styles of hearing aids and the differences in technology levels.  The patient is interested in obtaining Resound Nexia 9 rechargeable RICs in the color champagne, and he measures a #1 for both ears.  His insurance will change in the new year, and he believes he will have a hearing aid benefit.  We will need to check the patient's insurance in January to determine if his benefit is through a third party.  He will call us when he has his new insurance information.  He has never worn hearing aids.      APPOINTMENT TIME: 3:30-4:00

## 2024-12-26 NOTE — PROGRESS NOTES
AUDIOLOGY ADULT AUDIOMETRIC EVALUATION    Name:  Luis Manuel Avitia  :  1950  Age:  74 y.o.  Date of Evaluation:  2024    Reason for visit: Mr. Avitia is seen in the clinic today at the request of Sihvam Malloy MD in otolaryngology for an audiologic evaluation.     HISTORY  The patient reported bilateral hearing loss, right-sided tinnitus, a feeling of being lightheaded, and occasional right-sided aural pressure.       EVALUATION  See scanned audiogram: “Media” > “Audiology Report”.      RESULTS  Otoscopic Evaluation:  Right Ear: clear ear canal  Left Ear: clear ear canal    Immittance Measures:  Tympanometry:  Right Ear: Type A, normal tympanic membrane mobility with normal middle ear pressure   Left Ear: Type Ad, hypercompliant tympanic membrane mobility with normal middle ear pressure     Acoustic Reflexes:  Ipsilateral Right Ear: could not evaluate due to too many artifacts  Ipsilateral Left Ear: could not evaluate due to too many artifacts  Contralateral Right Ear: did not evaluate  Contralateral Left Ear: did not evaluate    Distortion Product Otoacoustic Emissions (DPOAEs):  Right Ear: present at 1500 Hz; absent at 1000 and 8621-6417 Hz  Left Ear: absent at 1830-9239 Hz    Audiometry:  Test Technique and Reliability:   Standard audiometry via supra-aural headphones. Reliability is good.    Pure tone air and bone conduction audiometry:  Right Ear: mild sloping to moderately-severe sensorineural hearing loss at 9958-4129 Hz  Left Ear: mild sloping to moderately-severe sensorineural hearing loss at 9876-5387 Hz    Speech Audiometry (Word Recognition Scores):   Right Ear: Good, 84% in quiet at an elevated presentation level   Left Ear: Good, 80% in quiet at an elevated presentation level     IMPRESSIONS  Results of today's audiometric evaluation revealed a bilateral sensorineural hearing loss that will interfere with the patient's communication ability.    Present DPOAEs suggest normal/near  normal cochlear outer hair cell function and are consistent with no greater than a mild hearing loss at those frequencies. Absent DPOAEs are consistent with abnormal cochlear outer hair cell function at those frequencies.    RECOMMENDATIONS  - Follow up with otolaryngologist regarding lightheadedness and unilateral tinnitus.    - Annual audiologic evaluation, sooner if an acute change is noted.  - Hearing aid evaluation for binaural hearing aids.    - Follow-up with medical care team as planned.    PATIENT EDUCATION  Discussed results, impressions and recommendations with the patient. Questions were addressed and the patient was encouraged to contact our office should concerns arise.    Time for this encounter: 3:00-3:30    Unique Dawkins M.A., CCC-A   Licensed Audiologist

## 2025-01-02 ENCOUNTER — HOSPITAL ENCOUNTER (OUTPATIENT)
Dept: RADIOLOGY | Facility: HOSPITAL | Age: 75
Discharge: HOME | End: 2025-01-02
Payer: COMMERCIAL

## 2025-01-02 ENCOUNTER — OFFICE VISIT (OUTPATIENT)
Dept: PRIMARY CARE | Facility: CLINIC | Age: 75
End: 2025-01-02
Payer: COMMERCIAL

## 2025-01-02 VITALS
DIASTOLIC BLOOD PRESSURE: 80 MMHG | HEIGHT: 68 IN | WEIGHT: 220 LBS | HEART RATE: 101 BPM | BODY MASS INDEX: 33.34 KG/M2 | SYSTOLIC BLOOD PRESSURE: 128 MMHG | OXYGEN SATURATION: 94 %

## 2025-01-02 DIAGNOSIS — R06.02 SHORTNESS OF BREATH: ICD-10-CM

## 2025-01-02 DIAGNOSIS — G93.31 POSTVIRAL FATIGUE SYNDROME: ICD-10-CM

## 2025-01-02 DIAGNOSIS — R06.02 SHORTNESS OF BREATH: Primary | ICD-10-CM

## 2025-01-02 DIAGNOSIS — B34.9 VIRAL SYNDROME: ICD-10-CM

## 2025-01-02 LAB
POC RAPID INFLUENZA A: NEGATIVE
POC RAPID INFLUENZA B: NEGATIVE

## 2025-01-02 PROCEDURE — 3074F SYST BP LT 130 MM HG: CPT

## 2025-01-02 PROCEDURE — 1159F MED LIST DOCD IN RCRD: CPT

## 2025-01-02 PROCEDURE — 71046 X-RAY EXAM CHEST 2 VIEWS: CPT

## 2025-01-02 PROCEDURE — 71046 X-RAY EXAM CHEST 2 VIEWS: CPT | Performed by: RADIOLOGY

## 2025-01-02 PROCEDURE — 1036F TOBACCO NON-USER: CPT

## 2025-01-02 PROCEDURE — 3079F DIAST BP 80-89 MM HG: CPT

## 2025-01-02 PROCEDURE — 99214 OFFICE O/P EST MOD 30 MIN: CPT

## 2025-01-02 PROCEDURE — 87804 INFLUENZA ASSAY W/OPTIC: CPT

## 2025-01-02 RX ORDER — ALBUTEROL SULFATE 90 UG/1
2 INHALANT RESPIRATORY (INHALATION) EVERY 4 HOURS PRN
Qty: 6.7 G | Refills: 0 | Status: SHIPPED | OUTPATIENT
Start: 2025-01-02 | End: 2026-01-02

## 2025-01-02 RX ORDER — DOXYCYCLINE 100 MG/1
100 CAPSULE ORAL 2 TIMES DAILY
Qty: 14 CAPSULE | Refills: 0 | Status: SHIPPED | OUTPATIENT
Start: 2025-01-02 | End: 2025-01-09

## 2025-01-02 NOTE — PROGRESS NOTES
Subjective   Patient ID: Luis Manuel Avitia is a 74 y.o. male who presents for Cough (Cough, fatigue congestion x one week ).    Past Medical, Surgical, and Family History reviewed and updated in chart.    Reviewed all medications by prescribing practitioner or clinical pharmacist (such as prescriptions, OTCs, herbal therapies and supplements) and documented in the medical record.    HPI  1. URI symptoms   Luis Manuel's wife was sick a week before [place]  followed by Luis Manuel falling ill on 12/24. He visited a  urgent care at which time he was given a course of Augmentin with some improvement in symptoms. However, his symptoms returned three days ago with a headache, fatigue, and chest congestion/tightness but denies any fevers or cough. He is also concerned that he might have long-COVID associated fatigue.    Review of Systems  All pertinent positive symptoms are included in the history of present illness.    All other systems have been reviewed and are negative and noncontributory to this patient's current ailments.    Past Medical History:   Diagnosis Date    CAD (coronary artery disease)     status post CABG x2  with LIMA to LAD and saphenous vein graft to obtuse marginal branch of the LCx    Chronic hyponatremia     followed by nephrology, managed on meds; 9/7/23 VF=[id number]    Encounter for other preprocedural examination 12/08/2020    Preoperative clearance    HLD (hyperlipidemia)     HTN (hypertension)     Lumbar herniated disc     OA (osteoarthritis)     JOHNATHAN on CPAP     Other intervertebral disc displacement, lumbar region 10/30/2020    Lumbar disc herniation    Personal history of other diseases of the circulatory system     History of hypertension    Personal history of other diseases of the nervous system and sense organs     History of obstructive sleep apnea    SIADH (syndrome of inappropriate ADH production) (Multi)      Past Surgical History:   Procedure Laterality Date    CARDIAC CATHETERIZATION  2022    KNEE SURGERY  Right 2016    Knee Surgery    OTHER SURGICAL HISTORY  2022    Coronary artery bypass graft    OTHER SURGICAL HISTORY Left 2021    Hip surgery    SHOULDER SURGERY Right     TSR     Social History     Tobacco Use    Smoking status: Former     Current packs/day: 0.00     Types: Cigarettes     Quit date: 1980     Years since quittin.0     Passive exposure: Past    Smokeless tobacco: Never   Vaping Use    Vaping status: Never Used   Substance Use Topics    Alcohol use: Not Currently     Alcohol/week: 5.0 standard drinks of alcohol     Types: 5 Standard drinks or equivalent per week     Comment: 5 drinks weekly per pt    Drug use: Never     Family History   Problem Relation Name Age of Onset    Other (high blood pressure) Mother      Lung cancer Father       Immunization History   Administered Date(s) Administered    COVID-19, mRNA, LNP-S, PF, 30 mcg/0.3 mL dose 2021    Pfizer Purple Cap SARS-CoV-2 2021, 2021    Pneumococcal polysaccharide vaccine, 23-valent, age 2 years and older (PNEUMOVAX 23) 2020, 2020     Current Outpatient Medications   Medication Instructions    ammonium lactate (Amlactin) 12 % cream Ammonium Lactate 12 % External Cream APPLY TWICE A DAY Quantity: 385 Refills: 0 Ordered: 2020 DO Start : 2020 Active    amoxicillin (AMOXIL) 500 mg, oral, Every 12 hours scheduled    aspirin (ADULT LOW DOSE ASPIRIN) 81 mg, Daily    atorvastatin (LIPITOR) 80 mg, oral, Daily    cetirizine (ZYRTEC) 10 mg, oral, Daily    doxycycline (VIBRAMYCIN) 100 mg, oral, 2 times daily, Take with at least 8 ounces (large glass) of water, do not lie down for 30 minutes after    fluticasone propionate (Xhance) 93 mcg/actuation aerosol breath activated 1 puff, 2 times daily PRN    ketoconazole (NIZOral) 2 % cream Topical, 2 times daily    metoprolol succinate XL (TOPROL-XL) 100 mg, oral, Daily    multivitamin (MULTIPLE VITAMINS ORAL) 1 tablet, Daily    pantoprazole  "(ProtoNix) 20 mg EC tablet Take by mouth.    sildenafil (VIAGRA) 50 mg, Daily PRN    sodium chloride 1 g, oral, 2 times daily    Ure-Na 15 gram powder in packet oral powder once daily.     No Known Allergies    Objective   Vitals:    01/02/25 1457   BP: 128/80   Pulse: 101   SpO2: 94%   Weight: 99.8 kg (220 lb)   Height: 1.727 m (5' 8\")     Body mass index is 33.45 kg/m².    BP Readings from Last 3 Encounters:   01/02/25 128/80   12/24/24 139/86   12/06/24 166/89      Wt Readings from Last 3 Encounters:   01/02/25 99.8 kg (220 lb)   12/24/24 95.3 kg (210 lb)   12/23/24 101 kg (222 lb)        Office Visit on 01/02/2025   Component Date Value    POC Rapid Influenza A 01/02/2025 Negative     POC Rapid Influenza B 01/02/2025 Negative    Office Visit on 12/24/2024   Component Date Value    POC AZALIA-COV-2 AG 12/24/2024 Presumptive negative test for SARS-CoV-2 (no antigen detected)     POC Rapid Influenza A 12/24/2024 Negative     POC Rapid Influenza B 12/24/2024 Negative    Admission on 12/06/2024, Discharged on 12/06/2024   Component Date Value    Case Report 12/06/2024                      Value:Surgical Pathology                                Case: [id number]                                  Authorizing Provider:  Krishan Valente MD         Collected:           12/06/2024 1142              Ordering Location:     [facility]  Received:            12/06/2024 2310                                     OR                                                                           Pathologist:           Petty Downing MD                                                         Specimen:    SOFT TISSUE MASS RESECTION, SOFT TISSUE MASS RIGHT HAND                                    FINAL DIAGNOSIS 12/06/2024                      Value:Soft Tissue, Right Hand, Excision: Myopericytoma.          12/06/2024                      Value:By the signature on this report, the individual or group listed as making the Final " "Interpretation/Diagnosis certifies that they have reviewed this case.       Comment 12/06/2024                      Value:The constituent spindle cells demonstrate strong and diffuse expression of SMA and desmin, confirming the diagnosis.      Clinical History 12/06/2024                      Value:Pre-op diagnosis:  Carpal tunnel syndrome on right [G56.01]    Gross Description 12/06/2024                      Value:Received in formalin, labeled with the patient's name and hospital number and \"soft tissue mass right hand\", is an intact cyst with attached soft tissue measuring 1.1 x 0.9 x 0.6 cm.  The specimen is inked and bisected to a firm white, focally hemorrhagic cut surface.  The specimen is entirely submitted in one cassette.  [facility]      Disclaimer 12/06/2024                      Value:One or more of the reagents used to perform assays on this specimen MAY have contained components considered to be analyte specific reagents (ASR's).  ASR's have not been cleared or approved by the U.S. Food and Drug Administration.  These assays were developed and their performance characteristics determined by the Department of Pathology at [facility]. The FDA does not require this test to go through premarket FDA review. This test is used for clinical purposes. It should not be regarded as investigational or for research. This laboratory is certified under the Clinical Laboratory Improvement Amendments (CLIA) as qualified to perform high complexity clinical laboratory testing.  The assays were performed with appropriate positive and negative controls which stained appropriately.       Physical Exam  CONSTITUTIONAL - slightly ill appearing male in no acute distress   SKIN - normal skin color and pigmentation, normal skin turgor without rash, lesions, or nodules visualized  HEAD - no trauma, normocephalic  EYES - pupils are equal and reactive to light, extraocular muscles are intact, and normal " external exam  CHEST - mild wheezing in the right upper lobe. Crackles in the left lower lobe   CARDIAC - regular rate and regular rhythm, no skipped beats, no murmur  ABDOMEN - no organomegaly, soft, nontender, nondistended, normal bowel sounds  EXTREMITIES - no obvious or evident edema, no obvious or evident deformities  NEUROLOGICAL -  alert, oriented and no focal signs  PSYCHIATRIC - alert, pleasant and cordial, age-appropriate    Assessment/Plan   Problem List Items Addressed This Visit       Fatigue     Other Visit Diagnoses       Shortness of breath    -  Primary    Relevant Medications    doxycycline (Vibramycin) 100 mg capsule    Other Relevant Orders    XR chest 2 views    POCT Influenza A/B manually resulted (Completed)    Viral syndrome        Relevant Medications    doxycycline (Vibramycin) 100 mg capsule    Other Relevant Orders    XR chest 2 views    POCT Influenza A/B manually resulted (Completed)          URI symptoms  I am concerned pt could have pneumonia given crackles heard on exam and symptoms of chest tightness  -Chest XR ordered + doxycycline 100 mg BID x 7 days  -albuterol inhaler PRN for any SOB  -POC influenza and COVID test were both negative     I encouraged supportive care such as rest, fluids and Advil/Tylenol as warranted  Return to the clinic in 7-10 days or sooner if symptoms worsen or persist as we will then further evaluate     Kathrin Marte,   PGY-2  Family Medicine

## 2025-01-02 NOTE — PROGRESS NOTES
I reviewed and examined the patient. I was present for the key exam elements, and I fully participated in the patient's care. I discussed the management of the care with the resident. I have personally reviewed the pertinent labs and imaging, as well as recent notes, with the patient. I have reviewed the note above and agree with the resident's medical decision making as documented in the resident's note, in addition to the following comments / findings:     Agree with the rest of the plan outlined below by resident physician. No red flags.      The patient understands and agrees to the assessment and plan of care. Patient has also agreed to follow up and comply with the treatment and evaluation as recommended today. Patient was instructed to call the office at [phone] should questions arise regarding their treatment or care.     Omer Gar DO, FAOASM  Family Medicine   [address], [address]  [place] [address]     Omer Gar DO

## 2025-01-06 ENCOUNTER — TELEPHONE (OUTPATIENT)
Dept: PRIMARY CARE | Facility: CLINIC | Age: 75
End: 2025-01-06
Payer: COMMERCIAL

## 2025-01-06 DIAGNOSIS — J40 BRONCHITIS: Primary | ICD-10-CM

## 2025-01-06 RX ORDER — CODEINE PHOSPHATE AND GUAIFENESIN 10; 100 MG/5ML; MG/5ML
5 SOLUTION ORAL EVERY 6 HOURS PRN
Qty: 120 ML | Refills: 0 | Status: SHIPPED | OUTPATIENT
Start: 2025-01-06 | End: 2025-01-09 | Stop reason: SDUPTHER

## 2025-01-06 NOTE — TELEPHONE ENCOUNTER
Patient states that he is not getting better. Requesting a decongestant and a cough medicine with codeine.

## 2025-01-07 ENCOUNTER — TELEPHONE (OUTPATIENT)
Dept: OTOLARYNGOLOGY | Facility: CLINIC | Age: 75
End: 2025-01-07
Payer: COMMERCIAL

## 2025-01-07 NOTE — TELEPHONE ENCOUNTER
Pt called and cxl his appointment for this am.  He said he has the flu.  He is asking if you could do a telehealth visit instead with him.  He needs a cpap follow up plus discuss some other concerns he is having.

## 2025-01-09 ENCOUNTER — TELEPHONE (OUTPATIENT)
Dept: PRIMARY CARE | Facility: CLINIC | Age: 75
End: 2025-01-09
Payer: COMMERCIAL

## 2025-01-09 DIAGNOSIS — J40 BRONCHITIS: ICD-10-CM

## 2025-01-09 RX ORDER — CODEINE PHOSPHATE AND GUAIFENESIN 10; 100 MG/5ML; MG/5ML
5 SOLUTION ORAL EVERY 6 HOURS PRN
Qty: 120 ML | Refills: 0 | Status: SHIPPED | OUTPATIENT
Start: 2025-01-09 | End: 2025-01-16

## 2025-01-09 NOTE — TELEPHONE ENCOUNTER
Patient called asked if he could get refill on   Robitussin AC one teaspoon every 6 hours PRN  [facility] [place]  He said its the only thing that has helped

## 2025-01-16 ENCOUNTER — TELEPHONE (OUTPATIENT)
Dept: PRIMARY CARE | Facility: CLINIC | Age: 75
End: 2025-01-16
Payer: COMMERCIAL

## 2025-01-16 NOTE — TELEPHONE ENCOUNTER
Patient called isn't really feeling any better. Tried all his meds.  Wants to know what the next step is.

## 2025-01-17 DIAGNOSIS — J32.9 SINUSITIS, UNSPECIFIED CHRONICITY, UNSPECIFIED LOCATION: Primary | ICD-10-CM

## 2025-01-17 DIAGNOSIS — J40 BRONCHITIS: Primary | ICD-10-CM

## 2025-01-17 RX ORDER — AMOXICILLIN AND CLAVULANATE POTASSIUM 875; 125 MG/1; MG/1
875 TABLET, FILM COATED ORAL 2 TIMES DAILY
Qty: 14 TABLET | Refills: 0 | Status: SHIPPED | OUTPATIENT
Start: 2025-01-17 | End: 2025-01-27

## 2025-01-17 RX ORDER — METHYLPREDNISOLONE 4 MG/1
TABLET ORAL
Qty: 21 TABLET | Refills: 0 | Status: SHIPPED | OUTPATIENT
Start: 2025-01-17

## 2025-01-23 ENCOUNTER — APPOINTMENT (OUTPATIENT)
Dept: OTOLARYNGOLOGY | Facility: CLINIC | Age: 75
End: 2025-01-23
Payer: COMMERCIAL

## 2025-01-23 VITALS — HEIGHT: 67 IN | BODY MASS INDEX: 34.53 KG/M2 | WEIGHT: 220 LBS

## 2025-01-23 DIAGNOSIS — H90.3 BILATERAL SENSORINEURAL HEARING LOSS: ICD-10-CM

## 2025-01-23 DIAGNOSIS — R06.83 SNORING: ICD-10-CM

## 2025-01-23 DIAGNOSIS — G47.33 OBSTRUCTIVE SLEEP APNEA: Primary | ICD-10-CM

## 2025-01-23 PROCEDURE — 1036F TOBACCO NON-USER: CPT | Performed by: OTOLARYNGOLOGY

## 2025-01-23 PROCEDURE — 99214 OFFICE O/P EST MOD 30 MIN: CPT | Performed by: OTOLARYNGOLOGY

## 2025-01-23 PROCEDURE — 1159F MED LIST DOCD IN RCRD: CPT | Performed by: OTOLARYNGOLOGY

## 2025-01-23 NOTE — PROGRESS NOTES
Chief Complaint   Patient presents with    Follow-up     LOV: 1/2024 HEARING CHECK, HAD AUDIO DONE 12/26 AND CPAP FOLLOW-UP     HPI:  Luis Manuel Avitia is a 74 y.o. male who presents today for continued valuation of his chronic obstructive sleep apnea.  As well as to recheck his hearing.  He does have some concerns about some brain fog and hearing loss being related to long COVID.  He has a h/o bilateral sloping symmetric sensorineural hearing loss.  Audiogram was repeated on December 26, 2024 which does continue to show sloping generally symmetric moderate to severe sensorineural hearing loss.  H/O obstructive sleep apnea which was diagnosed in 2017 with an AHI of 89.   On CPAP w good clinical effect.  Compliance report ending January 19, 2025 shows excellent 97% compliance.  Average usage is 8 hours and 12 minutes.  Currently set at auto from 5 to 20 cm of water.  95th percentile pressure is 11.9 with an average AHI of 0.8.    PMH:  Past Medical History:   Diagnosis Date    CAD (coronary artery disease)     status post CABG x2  with LIMA to LAD and saphenous vein graft to obtuse marginal branch of the LCx    Chronic hyponatremia     followed by nephrology, managed on meds; 9/7/23 RF=983    Encounter for other preprocedural examination 12/08/2020    Preoperative clearance    HLD (hyperlipidemia)     HTN (hypertension)     Lumbar herniated disc     OA (osteoarthritis)     JOHNATHAN on CPAP     Other intervertebral disc displacement, lumbar region 10/30/2020    Lumbar disc herniation    Personal history of other diseases of the circulatory system     History of hypertension    Personal history of other diseases of the nervous system and sense organs     History of obstructive sleep apnea    SIADH (syndrome of inappropriate ADH production) (Multi)      Past Surgical History:   Procedure Laterality Date    CARDIAC CATHETERIZATION  2022    KNEE SURGERY Right 05/11/2016    Knee Surgery    OTHER SURGICAL HISTORY  09/23/2022     Coronary artery bypass graft    OTHER SURGICAL HISTORY Left 12/17/2021    Hip surgery    SHOULDER SURGERY Right     TSR         Medications:     Current Outpatient Medications:     albuterol (Proventil HFA) 90 mcg/actuation inhaler, Inhale 2 puffs every 4 hours if needed for wheezing or shortness of breath., Disp: 6.7 g, Rfl: 0    ammonium lactate (Amlactin) 12 % cream, Ammonium Lactate 12 % External Cream APPLY TWICE A DAY Quantity: 385 Refills: 0 Ordered: 25-Nov-2020 DO Start : 25-Nov-2020 Active, Disp: , Rfl:     amoxicillin-pot clavulanate (Augmentin) 875-125 mg tablet, Take 1 tablet (875 mg) by mouth 2 times a day for 10 days., Disp: 14 tablet, Rfl: 0    aspirin (Adult Low Dose Aspirin) 81 mg EC tablet, Take 1 tablet (81 mg) by mouth once daily. As directed, Disp: , Rfl:     atorvastatin (Lipitor) 80 mg tablet, TAKE 1 TABLET BY MOUTH EVERY DAY, Disp: 30 tablet, Rfl: 12    fluticasone propionate (Xhance) 93 mcg/actuation aerosol breath activated, Administer 1 puff into affected nostril(s) 2 times a day as needed., Disp: , Rfl:     ketoconazole (NIZOral) 2 % cream, Apply topically 2 times a day., Disp: 30 g, Rfl: 0    methylPREDNISolone (Medrol, Brandon,) 4 mg tablets, Follow schedule on package instructions, Disp: 21 tablet, Rfl: 0    metoprolol succinate XL (Toprol-XL) 100 mg 24 hr tablet, TAKE 1 TABLET DAILY, Disp: 90 tablet, Rfl: 2    multivitamin (MULTIPLE VITAMINS ORAL), Take 1 tablet by mouth once daily., Disp: , Rfl:     pantoprazole (ProtoNix) 20 mg EC tablet, Take by mouth., Disp: , Rfl:     sildenafil (Viagra) 50 mg tablet, Take 1 tablet (50 mg) by mouth once daily as needed., Disp: , Rfl:     sodium chloride 1,000 mg tablet, TAKE 1 TABLET BY MOUTH TWICE DAILY, Disp: 60 tablet, Rfl: 3    Ure-Na 15 gram powder in packet oral powder, once daily., Disp: 90 packet, Rfl: 3    cetirizine (ZyrTEC) 10 mg tablet, Take 1 tablet (10 mg) by mouth once daily for 14 days., Disp: 14 tablet, Rfl: 0     Allergies:  No  "Known Allergies     ROS:  Review of systems normal unless stated otherwise in the HPI and/or PMH.    Physical Exam:  Height 1.702 m (5' 7\"), weight 99.8 kg (220 lb). Body mass index is 34.46 kg/m².     GENERAL APPEARANCE: Well developed and well nourished.  Alert and oriented in no acute distress.  Normal vocal quality.      HEAD/FACE: No erythema or edema or facial tenderness.  Normal facial nerve function bilaterally.    EAR:       EXTERNAL: Normal pinnas and external auditory canals without lesion or obstructing wax.       MIDDLE EAR: Tympanic membranes intact and mobile with normal landmarks.  Middle ear space appears well aerated.        TUBE STATUS: N/A       MASTOID CAVITY: N/A       HEARING: Gross hearing assessment is within normal limits.       NOSE:       VISUALIZED USING: Anterior rhinoscopy with headlight and nasal speculum.       DORSUM: Midline, nontraumatic appearance.       MUCOSA: Normal-appearing.       SECRETIONS: Normal.       SEPTUM: Midline and nonobstructing.       INFERIOR TURBINATES: Normal.       MIDDLE TURBINATES/MEATUS: N/A       BLEEDING: N/A         ORAL CAVITY/PHARYNX:       TEETH: Adequate dentition.       TONGUE: No mass or lesion.  Normal mobility.       FLOOR OF MOUTH: No mass or lesion.       PALATE: Normal hard palate, soft palate, and uvula.       OROPHARYNX: Normal without mass or lesion.       BUCCAL MUCOSA/GBS: Normal without mass or lesion.       LIPS: Normal.    LARYNX/HYPOPHARYNX/NASOPHARYNX: N/A    NECK: No palpable masses or abnormal adenopathy.  Trachea is midline.    THYROID: No thyromegaly or palpable nodule.    SALIVARY GLANDS: Normal bilateral parotid and submandibular glands by inspection and palpation.    TMJ's: Normal.    NEURO: Cranial nerve exam grossly normal bilaterally.       Assessment/Plan   Luis Manuel was seen today for follow-up.  Diagnoses and all orders for this visit:  Obstructive sleep apnea (Primary)  Bilateral sensorineural hearing loss  Snoring    Doing " great with CPAP compliance and clinical effect.  He will continue.  We did discuss inspire for a minute but he is not really interested.  Continue to have discussions about his hearing loss.  Recommend hearing aids and he will go ahead and schedule an appointment with [facility].  Continue to follow-up with his PCP and neurology about some brain fog and possible long COVID sequelae.  Follow up in about 1 year (around 1/23/2026) for audiogram.     Shivam Malloy MD

## 2025-02-04 ENCOUNTER — APPOINTMENT (OUTPATIENT)
Dept: OTOLARYNGOLOGY | Facility: CLINIC | Age: 75
End: 2025-02-04
Payer: COMMERCIAL

## 2025-02-06 DIAGNOSIS — G56.01 CARPAL TUNNEL SYNDROME ON RIGHT: Primary | ICD-10-CM

## 2025-02-06 RX ORDER — METHYLPREDNISOLONE 4 MG/1
4 TABLET ORAL ONCE
Qty: 21 TABLET | Refills: 0 | Status: SHIPPED | OUTPATIENT
Start: 2025-02-06 | End: 2025-02-06

## 2025-02-06 NOTE — PROGRESS NOTES
Spoke with the patient over the telephone.  He has been experiencing a flareup of the swelling of his right hand.  Previously underwent right carpal tunnel decompression on 12/6/2024 no complications although did have a lot of swelling after the surgery.  He states that his wound is well healed, there is no erythema mild warmth or any open areas on the incision.  He has tenderness over the incision and some numbness in the median nerve distribution.  At this time we discussed treatment options including referral to Occupational Therapy versus a short course of an oral steroid.  After discussion there does not seem to be a concern for any infection.  He was encouraged to continue with range of motion we will get him started on a course of a steroid taper.  He will follow-up with our office virtually next week.  If anything changes in the meantime he was instructed to contact our office.    Edna Nam PA-C  Department of Orthopaedic Surgery  [facility]    Dictation performed with the use of voice recognition software. Syntax and grammatical errors may exist.

## 2025-02-26 ENCOUNTER — TELEPHONE (OUTPATIENT)
Dept: PRIMARY CARE | Facility: CLINIC | Age: 75
End: 2025-02-26
Payer: COMMERCIAL

## 2025-02-26 DIAGNOSIS — J02.9 PHARYNGITIS, UNSPECIFIED ETIOLOGY: Primary | ICD-10-CM

## 2025-02-26 RX ORDER — AMOXICILLIN 500 MG/1
500 CAPSULE ORAL EVERY 12 HOURS SCHEDULED
Qty: 14 CAPSULE | Refills: 0 | Status: SHIPPED | OUTPATIENT
Start: 2025-02-26 | End: 2025-03-05

## 2025-02-26 NOTE — TELEPHONE ENCOUNTER
Bad headache, sore throat, fever? Granddaughter did have strep over two weeks ago. Started yesterday . Will covid test and call back.   Test negative

## 2025-03-04 ENCOUNTER — DOCUMENTATION (OUTPATIENT)
Dept: PHYSICAL THERAPY | Facility: CLINIC | Age: 75
End: 2025-03-04
Payer: COMMERCIAL

## 2025-03-04 NOTE — PROGRESS NOTES
Discharge Summary    Name: Luis Manuel Avitia  MRN: [id number]  : 1950  Date: 24    Discharge Summary: PT    Discharge Information: Date of discharge 3/4/25    Therapy Summary: Pt is slowly progressing.  He would like to continue with HEP independent at this time.  Tolerated the session well, no exercises was limited by pain just fatigue.     Discharge Status: Was able to walk x 3 this week and it went well. And doing stretches more often. Was hurting for a few days after last session. Would like today to be his last PT session. More concerned with losing weight and doing more cardio for his heart health.      Rehab Discharge Reason: Progress plateaued; further improvement possible

## 2025-04-01 ENCOUNTER — TELEPHONE (OUTPATIENT)
Facility: CLINIC | Age: 75
End: 2025-04-01
Payer: COMMERCIAL

## 2025-04-01 DIAGNOSIS — J32.9 SINUSITIS, UNSPECIFIED CHRONICITY, UNSPECIFIED LOCATION: Primary | ICD-10-CM

## 2025-04-01 RX ORDER — AMOXICILLIN AND CLAVULANATE POTASSIUM 875; 125 MG/1; MG/1
875 TABLET, FILM COATED ORAL 2 TIMES DAILY
Qty: 20 TABLET | Refills: 0 | Status: SHIPPED | OUTPATIENT
Start: 2025-04-01 | End: 2025-04-11

## 2025-04-01 NOTE — TELEPHONE ENCOUNTER
Patient thinks he has sinus infection.  He has been fighting something for three weeks.  Symptoms pressure in forehead and sinus area.  Pressure in ears too.  Used OTC meds didn't help.  Can he get an antibiotic.  Ph[facility]acy [facility] [place].

## 2025-05-19 ENCOUNTER — OFFICE VISIT (OUTPATIENT)
Dept: URGENT CARE | Age: 75
End: 2025-05-19
Payer: COMMERCIAL

## 2025-05-19 VITALS
SYSTOLIC BLOOD PRESSURE: 145 MMHG | DIASTOLIC BLOOD PRESSURE: 82 MMHG | OXYGEN SATURATION: 97 % | RESPIRATION RATE: 19 BRPM | BODY MASS INDEX: 32.89 KG/M2 | TEMPERATURE: 97.4 F | WEIGHT: 210 LBS | HEART RATE: 56 BPM

## 2025-05-19 DIAGNOSIS — T63.481A INSECT STINGS, ACCIDENTAL OR UNINTENTIONAL, INITIAL ENCOUNTER: ICD-10-CM

## 2025-05-19 RX ORDER — CETIRIZINE HYDROCHLORIDE 10 MG/1
10 TABLET ORAL DAILY
Qty: 14 TABLET | Refills: 0 | Status: SHIPPED | OUTPATIENT
Start: 2025-05-19 | End: 2025-06-02

## 2025-05-19 RX ORDER — FAMOTIDINE 40 MG/1
40 TABLET, FILM COATED ORAL DAILY
Qty: 30 TABLET | Refills: 0 | Status: SHIPPED | OUTPATIENT
Start: 2025-05-19 | End: 2025-06-18

## 2025-05-19 NOTE — PROGRESS NOTES
Subjective   Patient ID: Luis Manuel Avitia is a 74 y.o. male. They present today with a chief complaint of Insect Bite (Pt got stung by bee yesterday, area swollen and painful).    Past Medical History  Allergies as of 05/19/2025    (No Known Allergies)       Prescriptions Prior to Admission[1]     Medical History[2]    Surgical History[3]     reports that he quit smoking about 45 years ago. His smoking use included cigarettes. He has been exposed to tobacco smoke. He has never used smokeless tobacco. He reports that he does not currently use alcohol after a past usage of about 5.0 standard drinks of alcohol per week. He reports that he does not use drugs.    Review of Systems  ROS is negative unless otherwise stated in HPI.         Objective    Vitals:    05/19/25 0819   BP: 145/82   BP Location: Left arm   Patient Position: Sitting   BP Cuff Size: Large adult   Pulse: 56   Resp: 19   Temp: 36.3 °C (97.4 °F)   TempSrc: Oral   SpO2: 97%   Weight: 95.3 kg (210 lb)     No LMP for male patient.      VS: As documented in the triage note and EMR flowsheet from this visit was reviewed  General: Well appearing. No acute distress.   Eyes:  Extraocular movements grossly intact. No scleral icterus.   Head: Atraumatic. Normocephalic.     Neck: No meningismus. No gross masses. Full movement through range of motion  CV: Regular rhythm. No murmurs, rubs, gallops appreciated.   Resp: Clear to auscultation bilaterally. No respiratory distress.    Skin: Warm, dry. No rashes. Mild edema to the palm of the right hand. FROM. Able to make a fist. No visible stinger.   Neuro: CN II-VII intact. A&O x3. Speech fluent. Alert. Moving all extremities. Ambulates with normal gait  Psych: Appropriate mood and affect for situation      Point of Care Test & Imaging Results from this visit  No results found for this visit on 05/19/25.   Imaging  No results found.    Cardiology, Vascular, and Other Imaging  No other imaging results found for the past 2  days      Diagnostic study results (if any) were reviewed by Kathleen Olivo PA-C.    Assessment/Plan   Allergies, medications, history, and pertinent labs/EKGs/Imaging reviewed by Kathleen Olivo PA-C.     Medical Decision Making  Patient is a 74-year-old male who presents for bee sting of the right hand that occurred yesterday.  States that his hand was painful and mildly swollen this morning so his wife asked him to be evaluated.  On examination, there is no visible stinger in the right hand.  He does have mild edema.  He has full range of motion of the right hand.  No erythema, warmth or purulence to suggest suggest abscess or secondary infection.  No systemic symptoms concerning for anaphylaxis. Patient provided prescriptions for Zyrtec and Pepcid advised on icing and conservative management.    Orders and Diagnoses  Diagnoses and all orders for this visit:  Insect stings, accidental or unintentional, initial encounter  -     cetirizine (ZyrTEC) 10 mg tablet; Take 1 tablet (10 mg) by mouth once daily for 14 days.  -     famotidine (Pepcid) 40 mg tablet; Take 1 tablet (40 mg) by mouth once daily.      Medical Admin Record      Patient disposition: Home    Electronically signed by Kathleen Olivo PA-C  8:57 AM           [1] (Not in a hospital admission)   [2]   Past Medical History:  Diagnosis Date    CAD (coronary artery disease)     status post CABG x2  with LIMA to LAD and saphenous vein graft to obtuse marginal branch of the LCx    Chronic hyponatremia     followed by nephrology, managed on meds; 9/7/23 GV=275    Encounter for other preprocedural examination 12/08/2020    Preoperative clearance    HLD (hyperlipidemia)     HTN (hypertension)     Lumbar herniated disc     OA (osteoarthritis)     JOHNATHAN on CPAP     Other intervertebral disc displacement, lumbar region 10/30/2020    Lumbar disc herniation    Personal history of other diseases of the circulatory system     History of hypertension    Personal  history of other diseases of the nervous system and sense organs     History of obstructive sleep apnea    SIADH (syndrome of inappropriate ADH production) (Multi)    [3]   Past Surgical History:  Procedure Laterality Date    CARDIAC CATHETERIZATION  2022    KNEE SURGERY Right 05/11/2016    Knee Surgery    OTHER SURGICAL HISTORY  09/23/2022    Coronary artery bypass graft    OTHER SURGICAL HISTORY Left 12/17/2021    Hip surgery    SHOULDER SURGERY Right     TSR

## 2025-05-22 ENCOUNTER — TELEPHONE (OUTPATIENT)
Facility: CLINIC | Age: 75
End: 2025-05-22
Payer: COMMERCIAL

## 2025-05-22 DIAGNOSIS — H10.9 CONJUNCTIVITIS, UNSPECIFIED CONJUNCTIVITIS TYPE, UNSPECIFIED LATERALITY: Primary | ICD-10-CM

## 2025-05-22 DIAGNOSIS — H10.33 ACUTE BACTERIAL CONJUNCTIVITIS OF BOTH EYES: Primary | ICD-10-CM

## 2025-05-22 RX ORDER — NEOMYCIN SULFATE, POLYMYXIN B SULFATE AND DEXAMETHASONE 3.5; 10000; 1 MG/ML; [USP'U]/ML; MG/ML
1 SUSPENSION/ DROPS OPHTHALMIC EVERY 6 HOURS SCHEDULED
Status: SHIPPED | OUTPATIENT
Start: 2025-05-22

## 2025-05-22 RX ORDER — POLYMYXIN B SULFATE AND TRIMETHOPRIM 1; 10000 MG/ML; [USP'U]/ML
1 SOLUTION OPHTHALMIC
Qty: 10 ML | Refills: 0 | Status: SHIPPED | OUTPATIENT
Start: 2025-05-22 | End: 2025-05-29

## 2025-05-22 NOTE — TELEPHONE ENCOUNTER
Patient has pink eye wants to know If you can send in drops or something for it      [facility]

## 2025-06-10 ENCOUNTER — TELEPHONE (OUTPATIENT)
Facility: CLINIC | Age: 75
End: 2025-06-10
Payer: COMMERCIAL

## 2025-08-05 ENCOUNTER — APPOINTMENT (OUTPATIENT)
Facility: CLINIC | Age: 75
End: 2025-08-05
Payer: MEDICARE

## 2025-08-05 VITALS
HEART RATE: 78 BPM | DIASTOLIC BLOOD PRESSURE: 80 MMHG | OXYGEN SATURATION: 98 % | BODY MASS INDEX: 35.71 KG/M2 | SYSTOLIC BLOOD PRESSURE: 128 MMHG | WEIGHT: 228 LBS

## 2025-08-05 DIAGNOSIS — Z12.5 PROSTATE CANCER SCREENING: ICD-10-CM

## 2025-08-05 DIAGNOSIS — E78.2 MIXED HYPERLIPIDEMIA: ICD-10-CM

## 2025-08-05 DIAGNOSIS — E87.1 HYPONATREMIA: ICD-10-CM

## 2025-08-05 DIAGNOSIS — Z13.29 SCREENING FOR THYROID DISORDER: ICD-10-CM

## 2025-08-05 DIAGNOSIS — E55.9 VITAMIN D DEFICIENCY: ICD-10-CM

## 2025-08-05 DIAGNOSIS — N52.9 ERECTILE DYSFUNCTION, UNSPECIFIED ERECTILE DYSFUNCTION TYPE: ICD-10-CM

## 2025-08-05 DIAGNOSIS — R41.89 BRAIN FOG: Primary | ICD-10-CM

## 2025-08-05 DIAGNOSIS — Z00.00 HEALTHCARE MAINTENANCE: ICD-10-CM

## 2025-08-05 DIAGNOSIS — R53.83 OTHER FATIGUE: ICD-10-CM

## 2025-08-05 DIAGNOSIS — R53.83 FATIGUE AFTER COVID-19 VACCINATION: ICD-10-CM

## 2025-08-05 DIAGNOSIS — Z13.6 SCREENING FOR HEART DISEASE: ICD-10-CM

## 2025-08-05 DIAGNOSIS — Z86.2 HISTORY OF ANEMIA: ICD-10-CM

## 2025-08-05 DIAGNOSIS — Z13.1 SCREENING FOR DIABETES MELLITUS: ICD-10-CM

## 2025-08-05 DIAGNOSIS — R73.9 HYPERGLYCEMIA: ICD-10-CM

## 2025-08-05 DIAGNOSIS — T50.B95A FATIGUE AFTER COVID-19 VACCINATION: ICD-10-CM

## 2025-08-05 DIAGNOSIS — I10 ESSENTIAL (PRIMARY) HYPERTENSION: ICD-10-CM

## 2025-08-05 DIAGNOSIS — Z13.0 SCREENING FOR DISORDER OF BLOOD AND BLOOD-FORMING ORGANS: ICD-10-CM

## 2025-08-05 PROCEDURE — 1159F MED LIST DOCD IN RCRD: CPT

## 2025-08-05 PROCEDURE — G2211 COMPLEX E/M VISIT ADD ON: HCPCS

## 2025-08-05 PROCEDURE — 3079F DIAST BP 80-89 MM HG: CPT

## 2025-08-05 PROCEDURE — 99214 OFFICE O/P EST MOD 30 MIN: CPT

## 2025-08-05 PROCEDURE — 3074F SYST BP LT 130 MM HG: CPT

## 2025-08-05 RX ORDER — SILDENAFIL 50 MG/1
100 TABLET, FILM COATED ORAL DAILY PRN
Qty: 30 TABLET | Refills: 1 | Status: SHIPPED | OUTPATIENT
Start: 2025-08-05

## 2025-08-05 ASSESSMENT — PATIENT HEALTH QUESTIONNAIRE - PHQ9
SUM OF ALL RESPONSES TO PHQ9 QUESTIONS 1 AND 2: 0
1. LITTLE INTEREST OR PLEASURE IN DOING THINGS: NOT AT ALL
2. FEELING DOWN, DEPRESSED OR HOPELESS: NOT AT ALL

## 2025-08-05 NOTE — PROGRESS NOTES
Subjective   Patient ID: Luis Manuel Avitia is a 74 y.o. male who presents for brain fog and fatigue .  Today he is accompanied by alone.     HPI  Fatigue and Brain fog/hyponatremia  Saw a neurologist in [place] who ordered blood work and did not find anything.  Has had Covid x 2 and believes that has worsened it,   Feels tired constantly, only likes to lay down and go to sleep  Wishing to get blood work done to further evaluate his symptoms  Has a history of hyponatremia that is chronic    Hyperlipidemia  Currently on atorvastatin 80 once daily    Erectile dysfunction  Requesting a prescription for Viagra 100 mg daily as needed to be sent to his pharmacy.    Hypertension  Currently on metoprolol 100 mg once daily    Medications Ordered Prior to Encounter[1]     Allergies[2]    Immunization History   Administered Date(s) Administered    COVID-19, mRNA, LNP-S, PF, 30 mcg/0.3 mL dose 12/08/2021    Pfizer Purple Cap SARS-CoV-2 03/08/2021, 04/02/2021    Pneumococcal polysaccharide vaccine, 23-valent, age 2 years and older (PNEUMOVAX 23) 12/21/2020, 12/22/2020         Review of Systems  All pertinent positive symptoms are included in the history of present illness.  All other systems have been reviewed and are negative and noncontributory to this patient's current ailments.     Objective   /80   Pulse 78   Wt 103 kg (228 lb)   SpO2 98%   BMI 35.71 kg/m²   BSA: 2.21 meters squared  No visits with results within 1 Month(s) from this visit.   Latest known visit with results is:   Office Visit on 01/02/2025   Component Date Value Ref Range Status    POC Rapid Influenza A 01/02/2025 Negative  Negative Final    POC Rapid Influenza B 01/02/2025 Negative  Negative Final       Physical Exam  CONSTITUTIONAL - well nourished, well developed, looks like stated age, in no acute distress, not ill-appearing, and not tired appearing  SKIN - normal skin color and pigmentation, normal skin turgor without rash, lesions, or nodules  visualized  HEAD - no trauma, normocephalic  EYES - normal external exam  LUNG - clear to auscultation, no wheezing, no crackles and no rales, good effort  CARDIAC - regular rate and regular rhythm, no skipped beats, no murmur  ABDOMEN - no organomegaly, soft, nontender, nondistended, normal bowel sounds  EXTREMITIES - no edema, no deformities  NEUROLOGICAL - normal balance, normal motor, no ataxia  PSYCHIATRIC - alert, pleasant and cordial, age-appropriate      Assessment/Plan   Diagnoses and all orders for this visit:    Fatigue after COVID-19 vaccination/Brain Fog  We will notify you of results and treatment recommendations accordingly.  -     CBC and Auto Differential; Future  -     Vitamin D 25-Hydroxy,Total (for eval of Vitamin D levels); Future  -     Ferritin; Future  -     Testosterone, total and free; Future  -     Vitamin B12; Future    Vitamin D deficiency  -     Vitamin D 25-Hydroxy,Total (for eval of Vitamin D levels); Future    Essential (primary) hypertension  Continue medications as prescribed    Hyponatremia  -     Comprehensive Metabolic Panel; Future    Mixed hyperlipidemia  Continue medications as prescribed    Erectile dysfunction, unspecified erectile dysfunction type  -     sildenafil (Viagra) 50 mg tablet; Take 2 tablets (100 mg) by mouth once daily as needed for erectile dysfunction.    Healthcare maintenance  Blood work ordered for your Medicare wellness   -     TSH with reflex to Free T4 if abnormal; Future  -     Lipid Panel; Future  -     Comprehensive Metabolic Panel; Future  -     CBC and Auto Differential; Future  -     Hemoglobin A1C; Future         [1]   Current Outpatient Medications on File Prior to Visit   Medication Sig Dispense Refill    albuterol (Proventil HFA) 90 mcg/actuation inhaler Inhale 2 puffs every 4 hours if needed for wheezing or shortness of breath. 6.7 g 0    ammonium lactate (Amlactin) 12 % cream Ammonium Lactate 12 % External Cream APPLY TWICE A DAY Quantity:  385 Refills: 0 Ordered: 25-Nov-2020 DO Start : 25-Nov-2020 Active      aspirin (Adult Low Dose Aspirin) 81 mg EC tablet Take 1 tablet (81 mg) by mouth once daily. As directed      atorvastatin (Lipitor) 80 mg tablet TAKE 1 TABLET BY MOUTH EVERY DAY 30 tablet 12    metoprolol succinate XL (Toprol-XL) 100 mg 24 hr tablet TAKE 1 TABLET DAILY 90 tablet 2    multivitamin (MULTIPLE VITAMINS ORAL) Take 1 tablet by mouth once daily.      cetirizine (ZyrTEC) 10 mg tablet Take 1 tablet (10 mg) by mouth once daily for 14 days. (Patient not taking: Reported on 8/5/2025) 14 tablet 0    famotidine (Pepcid) 40 mg tablet Take 1 tablet (40 mg) by mouth once daily. (Patient not taking: Reported on 8/5/2025) 30 tablet 0    fluticasone propionate (Xhance) 93 mcg/actuation aerosol breath activated Administer 1 puff into affected nostril(s) 2 times a day as needed. (Patient not taking: Reported on 8/5/2025)      ketoconazole (NIZOral) 2 % cream Apply topically 2 times a day. (Patient not taking: Reported on 8/5/2025) 30 g 0    methylPREDNISolone (Medrol, Brandon,) 4 mg tablets Follow schedule on package instructions (Patient not taking: Reported on 8/5/2025) 21 tablet 0    pantoprazole (ProtoNix) 20 mg EC tablet Take by mouth. (Patient not taking: Reported on 8/5/2025)      sodium chloride 1,000 mg tablet TAKE 1 TABLET BY MOUTH TWICE DAILY (Patient not taking: Reported on 8/5/2025) 60 tablet 3    Ure-Na 15 gram powder in packet oral powder once daily. (Patient not taking: Reported on 8/5/2025) 90 packet 3    [DISCONTINUED] sildenafil (Viagra) 50 mg tablet Take 1 tablet (50 mg) by mouth once daily as needed.       Current Facility-Administered Medications on File Prior to Visit   Medication Dose Route Frequency Provider Last Rate Last Admin    neomycin-polymyxin-dexAMETHasone (Maxitrol) 3.5mg/mL-10,000 unit/mL-0.1 % ophthalmic suspension 1 drop  1 drop Both Eyes q6h GLADYS Gar DO       [2] No Known Allergies

## 2025-08-06 NOTE — PROGRESS NOTES
I reviewed and examined the patient. I was present for the key exam elements, and I fully participated in the patient's care. I discussed the management of the care with the resident. I have personally reviewed the pertinent labs and imaging, as well as recent notes, with the patient. I have reviewed the note above and agree with the resident's medical decision making as documented in the resident's note, in addition to the following comments / findings:     Agree with the rest of the plan outlined below by resident physician. No red flags.      The patient understands and agrees to the assessment and plan of care. Patient has also agreed to follow up and comply with the treatment and evaluation as recommended today. Patient was instructed to call the office at [phone] should questions arise regarding their treatment or care.     Omer Gar DO, FAOASM  Family Medicine   [address], [address]  [place] [address]     Omer Gar DO

## 2025-08-07 ENCOUNTER — APPOINTMENT (OUTPATIENT)
Dept: ORTHOPEDIC SURGERY | Facility: CLINIC | Age: 75
End: 2025-08-07
Payer: COMMERCIAL

## 2025-08-07 LAB
25(OH)D3+25(OH)D2 SERPL-MCNC: 55 NG/ML (ref 30–100)
ALBUMIN SERPL-MCNC: 4.2 G/DL (ref 3.6–5.1)
ALP SERPL-CCNC: 53 U/L (ref 35–144)
ALT SERPL-CCNC: 14 U/L (ref 9–46)
ANION GAP SERPL CALCULATED.4IONS-SCNC: 8 MMOL/L (CALC) (ref 7–17)
AST SERPL-CCNC: 17 U/L (ref 10–35)
BASOPHILS # BLD AUTO: 28 CELLS/UL (ref 0–200)
BASOPHILS NFR BLD AUTO: 0.5 %
BILIRUB SERPL-MCNC: 0.9 MG/DL (ref 0.2–1.2)
BUN SERPL-MCNC: 19 MG/DL (ref 7–25)
CALCIUM SERPL-MCNC: 9.4 MG/DL (ref 8.6–10.3)
CHLORIDE SERPL-SCNC: 98 MMOL/L (ref 98–110)
CHOLEST SERPL-MCNC: 147 MG/DL
CHOLEST/HDLC SERPL: 2.5 (CALC)
CO2 SERPL-SCNC: 26 MMOL/L (ref 20–32)
CREAT SERPL-MCNC: 0.72 MG/DL (ref 0.7–1.28)
EGFRCR SERPLBLD CKD-EPI 2021: 95 ML/MIN/1.73M2
EOSINOPHIL # BLD AUTO: 297 CELLS/UL (ref 15–500)
EOSINOPHIL NFR BLD AUTO: 5.4 %
ERYTHROCYTE [DISTWIDTH] IN BLOOD BY AUTOMATED COUNT: 11.9 % (ref 11–15)
EST. AVERAGE GLUCOSE BLD GHB EST-MCNC: 114 MG/DL
EST. AVERAGE GLUCOSE BLD GHB EST-SCNC: 6.3 MMOL/L
FERRITIN SERPL-MCNC: 194 NG/ML (ref 24–380)
GLUCOSE SERPL-MCNC: 107 MG/DL (ref 65–99)
HBA1C MFR BLD: 5.6 %
HCT VFR BLD AUTO: 40.9 % (ref 38.5–50)
HDLC SERPL-MCNC: 60 MG/DL
HGB BLD-MCNC: 13.7 G/DL (ref 13.2–17.1)
LDLC SERPL CALC-MCNC: 69 MG/DL (CALC)
LYMPHOCYTES # BLD AUTO: 1727 CELLS/UL (ref 850–3900)
LYMPHOCYTES NFR BLD AUTO: 31.4 %
MCH RBC QN AUTO: 34.9 PG (ref 27–33)
MCHC RBC AUTO-ENTMCNC: 33.5 G/DL (ref 32–36)
MCV RBC AUTO: 104.1 FL (ref 80–100)
MONOCYTES # BLD AUTO: 748 CELLS/UL (ref 200–950)
MONOCYTES NFR BLD AUTO: 13.6 %
NEUTROPHILS # BLD AUTO: 2701 CELLS/UL (ref 1500–7800)
NEUTROPHILS NFR BLD AUTO: 49.1 %
NONHDLC SERPL-MCNC: 87 MG/DL (CALC)
PLATELET # BLD AUTO: 252 THOUSAND/UL (ref 140–400)
PMV BLD REES-ECKER: 9 FL (ref 7.5–12.5)
POTASSIUM SERPL-SCNC: 4.5 MMOL/L (ref 3.5–5.3)
PROT SERPL-MCNC: 6.7 G/DL (ref 6.1–8.1)
PSA SERPL-MCNC: 1.17 NG/ML
RBC # BLD AUTO: 3.93 MILLION/UL (ref 4.2–5.8)
SODIUM SERPL-SCNC: 132 MMOL/L (ref 135–146)
TESTOST FREE SERPL-MCNC: NORMAL PG/ML
TESTOST SERPL-MCNC: NORMAL NG/DL
TRIGL SERPL-MCNC: 96 MG/DL
TSH SERPL-ACNC: 0.99 MIU/L (ref 0.4–4.5)
VIT B12 SERPL-MCNC: 404 PG/ML (ref 200–1100)
WBC # BLD AUTO: 5.5 THOUSAND/UL (ref 3.8–10.8)

## 2025-08-11 ENCOUNTER — APPOINTMENT (OUTPATIENT)
Dept: ORTHOPEDIC SURGERY | Facility: CLINIC | Age: 75
End: 2025-08-11
Payer: MEDICARE

## 2025-08-11 ENCOUNTER — APPOINTMENT (OUTPATIENT)
Dept: RADIOLOGY | Facility: CLINIC | Age: 75
End: 2025-08-11
Payer: MEDICARE

## 2025-08-11 DIAGNOSIS — M25.562 LEFT KNEE PAIN, UNSPECIFIED CHRONICITY: ICD-10-CM

## 2025-08-11 LAB
25(OH)D3+25(OH)D2 SERPL-MCNC: 55 NG/ML (ref 30–100)
ALBUMIN SERPL-MCNC: 4.2 G/DL (ref 3.6–5.1)
ALP SERPL-CCNC: 53 U/L (ref 35–144)
ALT SERPL-CCNC: 14 U/L (ref 9–46)
ANION GAP SERPL CALCULATED.4IONS-SCNC: 8 MMOL/L (CALC) (ref 7–17)
AST SERPL-CCNC: 17 U/L (ref 10–35)
BASOPHILS # BLD AUTO: 28 CELLS/UL (ref 0–200)
BASOPHILS NFR BLD AUTO: 0.5 %
BILIRUB SERPL-MCNC: 0.9 MG/DL (ref 0.2–1.2)
BUN SERPL-MCNC: 19 MG/DL (ref 7–25)
CALCIUM SERPL-MCNC: 9.4 MG/DL (ref 8.6–10.3)
CHLORIDE SERPL-SCNC: 98 MMOL/L (ref 98–110)
CHOLEST SERPL-MCNC: 147 MG/DL
CHOLEST/HDLC SERPL: 2.5 (CALC)
CO2 SERPL-SCNC: 26 MMOL/L (ref 20–32)
CREAT SERPL-MCNC: 0.72 MG/DL (ref 0.7–1.28)
EGFRCR SERPLBLD CKD-EPI 2021: 95 ML/MIN/1.73M2
EOSINOPHIL # BLD AUTO: 297 CELLS/UL (ref 15–500)
EOSINOPHIL NFR BLD AUTO: 5.4 %
ERYTHROCYTE [DISTWIDTH] IN BLOOD BY AUTOMATED COUNT: 11.9 % (ref 11–15)
EST. AVERAGE GLUCOSE BLD GHB EST-MCNC: 114 MG/DL
EST. AVERAGE GLUCOSE BLD GHB EST-SCNC: 6.3 MMOL/L
FERRITIN SERPL-MCNC: 194 NG/ML (ref 24–380)
GLUCOSE SERPL-MCNC: 107 MG/DL (ref 65–99)
HBA1C MFR BLD: 5.6 %
HCT VFR BLD AUTO: 40.9 % (ref 38.5–50)
HDLC SERPL-MCNC: 60 MG/DL
HGB BLD-MCNC: 13.7 G/DL (ref 13.2–17.1)
LDLC SERPL CALC-MCNC: 69 MG/DL (CALC)
LYMPHOCYTES # BLD AUTO: 1727 CELLS/UL (ref 850–3900)
LYMPHOCYTES NFR BLD AUTO: 31.4 %
MCH RBC QN AUTO: 34.9 PG (ref 27–33)
MCHC RBC AUTO-ENTMCNC: 33.5 G/DL (ref 32–36)
MCV RBC AUTO: 104.1 FL (ref 80–100)
MONOCYTES # BLD AUTO: 748 CELLS/UL (ref 200–950)
MONOCYTES NFR BLD AUTO: 13.6 %
NEUTROPHILS # BLD AUTO: 2701 CELLS/UL (ref 1500–7800)
NEUTROPHILS NFR BLD AUTO: 49.1 %
NONHDLC SERPL-MCNC: 87 MG/DL (CALC)
PLATELET # BLD AUTO: 252 THOUSAND/UL (ref 140–400)
PMV BLD REES-ECKER: 9 FL (ref 7.5–12.5)
POTASSIUM SERPL-SCNC: 4.5 MMOL/L (ref 3.5–5.3)
PROT SERPL-MCNC: 6.7 G/DL (ref 6.1–8.1)
PSA SERPL-MCNC: 1.17 NG/ML
RBC # BLD AUTO: 3.93 MILLION/UL (ref 4.2–5.8)
SODIUM SERPL-SCNC: 132 MMOL/L (ref 135–146)
TESTOST FREE SERPL-MCNC: 32.1 PG/ML (ref 30–135)
TESTOST SERPL-MCNC: 312 NG/DL (ref 250–1100)
TRIGL SERPL-MCNC: 96 MG/DL
TSH SERPL-ACNC: 0.99 MIU/L (ref 0.4–4.5)
VIT B12 SERPL-MCNC: 404 PG/ML (ref 200–1100)
WBC # BLD AUTO: 5.5 THOUSAND/UL (ref 3.8–10.8)

## 2025-08-18 ENCOUNTER — APPOINTMENT (OUTPATIENT)
Dept: ORTHOPEDIC SURGERY | Facility: CLINIC | Age: 75
End: 2025-08-18
Payer: MEDICARE

## 2025-08-18 ENCOUNTER — HOSPITAL ENCOUNTER (OUTPATIENT)
Dept: RADIOLOGY | Facility: CLINIC | Age: 75
Discharge: HOME | End: 2025-08-18
Payer: MEDICARE

## 2025-08-18 VITALS — WEIGHT: 228 LBS | BODY MASS INDEX: 35.79 KG/M2 | HEIGHT: 67 IN

## 2025-08-18 DIAGNOSIS — M19.131 SLAC (SCAPHOLUNATE ADVANCED COLLAPSE) OF WRIST, RIGHT: ICD-10-CM

## 2025-08-18 DIAGNOSIS — G89.29 WRIST PAIN, CHRONIC, RIGHT: ICD-10-CM

## 2025-08-18 DIAGNOSIS — M25.531 WRIST PAIN, CHRONIC, RIGHT: ICD-10-CM

## 2025-08-18 DIAGNOSIS — M25.531 WRIST PAIN, CHRONIC, RIGHT: Primary | ICD-10-CM

## 2025-08-18 DIAGNOSIS — G89.29 WRIST PAIN, CHRONIC, RIGHT: Primary | ICD-10-CM

## 2025-08-18 PROCEDURE — 20605 DRAIN/INJ JOINT/BURSA W/O US: CPT | Performed by: PHYSICIAN ASSISTANT

## 2025-08-18 PROCEDURE — 1159F MED LIST DOCD IN RCRD: CPT | Performed by: PHYSICIAN ASSISTANT

## 2025-08-18 PROCEDURE — 99213 OFFICE O/P EST LOW 20 MIN: CPT | Performed by: PHYSICIAN ASSISTANT

## 2025-08-18 PROCEDURE — 73110 X-RAY EXAM OF WRIST: CPT | Mod: RT

## 2025-08-18 PROCEDURE — 73110 X-RAY EXAM OF WRIST: CPT | Mod: RIGHT SIDE | Performed by: RADIOLOGY

## 2025-08-18 RX ORDER — TRIAMCINOLONE ACETONIDE 40 MG/ML
40 INJECTION, SUSPENSION INTRA-ARTICULAR; INTRAMUSCULAR
Status: COMPLETED | OUTPATIENT
Start: 2025-08-18 | End: 2025-08-18

## 2025-08-18 RX ORDER — LIDOCAINE HYDROCHLORIDE 10 MG/ML
4 INJECTION, SOLUTION INFILTRATION; PERINEURAL
Status: COMPLETED | OUTPATIENT
Start: 2025-08-18 | End: 2025-08-18

## 2025-08-18 RX ADMIN — LIDOCAINE HYDROCHLORIDE 4 ML: 10 INJECTION, SOLUTION INFILTRATION; PERINEURAL at 14:34

## 2025-08-18 RX ADMIN — TRIAMCINOLONE ACETONIDE 40 MG: 40 INJECTION, SUSPENSION INTRA-ARTICULAR; INTRAMUSCULAR at 14:34

## 2025-08-19 ENCOUNTER — APPOINTMENT (OUTPATIENT)
Dept: RADIOLOGY | Facility: CLINIC | Age: 75
End: 2025-08-19
Payer: MEDICARE

## 2025-08-19 ENCOUNTER — APPOINTMENT (OUTPATIENT)
Dept: ORTHOPEDIC SURGERY | Facility: CLINIC | Age: 75
End: 2025-08-19
Payer: MEDICARE

## 2025-08-26 ENCOUNTER — HOSPITAL ENCOUNTER (OUTPATIENT)
Dept: RADIOLOGY | Facility: CLINIC | Age: 75
Discharge: HOME | End: 2025-08-26
Payer: MEDICARE

## 2025-08-26 ENCOUNTER — OFFICE VISIT (OUTPATIENT)
Dept: ORTHOPEDIC SURGERY | Facility: CLINIC | Age: 75
End: 2025-08-26
Payer: MEDICARE

## 2025-08-26 DIAGNOSIS — M25.562 LEFT KNEE PAIN, UNSPECIFIED CHRONICITY: ICD-10-CM

## 2025-08-26 PROCEDURE — 73564 X-RAY EXAM KNEE 4 OR MORE: CPT | Mod: LT

## 2025-08-26 PROCEDURE — 73564 X-RAY EXAM KNEE 4 OR MORE: CPT | Mod: LEFT SIDE | Performed by: RADIOLOGY

## 2025-08-28 ENCOUNTER — TELEPHONE (OUTPATIENT)
Dept: AUDIOLOGY | Facility: CLINIC | Age: 75
End: 2025-08-28
Payer: MEDICARE

## 2025-09-05 ENCOUNTER — TELEPHONE (OUTPATIENT)
Dept: OTOLARYNGOLOGY | Facility: CLINIC | Age: 75
End: 2025-09-05
Payer: MEDICARE

## 2025-09-05 DIAGNOSIS — J32.9 CHRONIC SINUSITIS, UNSPECIFIED LOCATION: Primary | ICD-10-CM

## 2025-11-12 ENCOUNTER — APPOINTMENT (OUTPATIENT)
Dept: NEPHROLOGY | Facility: CLINIC | Age: 75
End: 2025-11-12
Payer: COMMERCIAL

## (undated) DEVICE — KIT, BEACH CHAIR TRIMANO

## (undated) DEVICE — Device

## (undated) DEVICE — GLOVE, SURGEON, PREMIERPRO PI, SZ-7.0, PF, WH

## (undated) DEVICE — ADHESIVE, SKIN, LIQUIBAND EXCEED

## (undated) DEVICE — MASK, FACE, TENET, FOAM POSITIONING, DISPOSABLE

## (undated) DEVICE — TIP, SUCTION, YANKAUER, FLEXIBLE

## (undated) DEVICE — GLOVE, SURGICAL, PROTEXIS PI BLUE W/NEUTHERA, 8.0, PF, LF

## (undated) DEVICE — SYRINGE, 20 CC, LUER LOCK

## (undated) DEVICE — DRAPE, SHEET, 17 X 23 IN

## (undated) DEVICE — SUTURE, ETHIBOND, P2, V-37, 30 IN, GREEN

## (undated) DEVICE — COVER, BACK TABLE, 65 X 90, HVY REINFORCED

## (undated) DEVICE — RETRIEVER, SUTURE, HEWSON

## (undated) DEVICE — IRRIGATION SYSTEM, WOUND, SURGIPHOR, 450ML, STERILE

## (undated) DEVICE — SUTURE, MONOCRYLIC, 4-0, P3, MONO 18

## (undated) DEVICE — CUFF, TOURNIQUET, 18 X 3, DUAL PORT/SNGL BLADDER, DISP, LF

## (undated) DEVICE — DRESSING, ABDOMINAL, WET PRUF, TENDERSORB, 5 X 9 IN, STERILE

## (undated) DEVICE — DRESSING, GAUZE, SPONGE, 12 PLY, 4 X 4 IN, PLASTIC POUCH, STRL 10PK

## (undated) DEVICE — BANDAGE, ELASTIC, COMPRESSION, W/REMOVABLE CLIP, 3 IN X 4.5 YD

## (undated) DEVICE — BLADE, SAW PFC DUAL CUT 25 X 90

## (undated) DEVICE — DRAPE, INCISE, ANTIMICROBIAL, IOBAN 2, LARGE, 17 X 23 IN, DISPOSABLE, STERILE

## (undated) DEVICE — DRAPE, SHEET, U, STERI DRAPE, 47 X 51 IN, DISPOSABLE, STERILE

## (undated) DEVICE — SUTURE, VICRYL 2-0, TAPER POINT, CT-1 UNDYED 27 INCH

## (undated) DEVICE — TISSUE ADHESIVE, EXOFIN, 1ML

## (undated) DEVICE — HOOD, STERISHIELD T4 SYSTEM

## (undated) DEVICE — INTERPULSE HANDPIECE SET W/ 10FT SUCTION TUBING

## (undated) DEVICE — BLADE, ARTHRO-LOK, MINI-MENISCUS, FLAT, 4 MM

## (undated) DEVICE — SYRINGE, 60 CC, IRRIGATION, TOOMEY TIP

## (undated) DEVICE — SUTURE, MONOCRYL, 3-0, 27 IN, SH, UNDYED

## (undated) DEVICE — SPONGE, LAP, XRAY DECT, 4IN X 18IN, W/MASTER DMT, STERILE

## (undated) DEVICE — GLOVE, SURGICAL, PROTEXIS PI , 7.5, PF, LF

## (undated) DEVICE — MIXER, CEMENT, MIXEVAC III HIGH VACUUM KIT, STERILE

## (undated) DEVICE — PADDING, WEBRIL, UNDERCAST, STERILE, 3 IN

## (undated) DEVICE — SUTURE, VICRYL 0, 36 IN, CT-1, VIOLET

## (undated) DEVICE — DRAPE, INSTRUMENT, W/POUCH, STERI DRAPE, 7 X 11 IN, DISPOSABLE, STERILE

## (undated) DEVICE — APPLICATOR, CHLORAPREP, W/ORANGE TINT, 26ML

## (undated) DEVICE — GLOVE, SURGICAL, PROTEXIS PI BLUE W/NEUTHERA, 7.5, PF, LF